# Patient Record
Sex: MALE | Race: WHITE
[De-identification: names, ages, dates, MRNs, and addresses within clinical notes are randomized per-mention and may not be internally consistent; named-entity substitution may affect disease eponyms.]

---

## 2017-01-03 ENCOUNTER — HOSPITAL ENCOUNTER (INPATIENT)
Age: 77
LOS: 21 days | Discharge: NURSING, REHAB OR LONG-TERM CARE | DRG: 233 | End: 2017-01-24

## 2017-01-03 DIAGNOSIS — R65.10: ICD-10-CM

## 2017-01-03 DIAGNOSIS — I31.9: ICD-10-CM

## 2017-01-03 DIAGNOSIS — E11.9: ICD-10-CM
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
## 2017-01-03 DIAGNOSIS — J96.01: ICD-10-CM

## 2017-01-03 DIAGNOSIS — N17.9: ICD-10-CM

## 2017-01-03 DIAGNOSIS — B36.8: ICD-10-CM

## 2017-01-03 DIAGNOSIS — R30.0: ICD-10-CM

## 2017-01-03 DIAGNOSIS — I21.4: Primary | ICD-10-CM

## 2017-01-03 DIAGNOSIS — I50.33: ICD-10-CM

## 2017-01-03 DIAGNOSIS — I10: ICD-10-CM

## 2017-01-03 DIAGNOSIS — D64.9: ICD-10-CM

## 2017-01-03 DIAGNOSIS — E87.6: ICD-10-CM

## 2017-01-03 DIAGNOSIS — F41.8: ICD-10-CM

## 2017-01-03 DIAGNOSIS — B96.4: ICD-10-CM

## 2017-01-03 DIAGNOSIS — J18.9: ICD-10-CM

## 2017-01-03 DIAGNOSIS — B37.9: ICD-10-CM

## 2017-01-03 DIAGNOSIS — I25.10: ICD-10-CM

## 2017-01-03 DIAGNOSIS — N40.0: ICD-10-CM

## 2017-01-03 DIAGNOSIS — J40: ICD-10-CM

## 2017-01-03 DIAGNOSIS — E11.8: ICD-10-CM

## 2017-01-03 DIAGNOSIS — N39.0: ICD-10-CM

## 2017-01-03 DIAGNOSIS — R49.0: ICD-10-CM

## 2017-01-03 DIAGNOSIS — E78.5: ICD-10-CM

## 2017-01-03 DIAGNOSIS — J34.2: ICD-10-CM

## 2017-01-23 ENCOUNTER — HOSPITAL ENCOUNTER (INPATIENT)
Dept: HOSPITAL 10 - VRC | Age: 77
LOS: 16 days | Discharge: HOME HEALTH | DRG: 280 | End: 2017-02-08
Attending: INTERNAL MEDICINE | Admitting: PHYSICAL MEDICINE & REHABILITATION
Payer: MEDICARE

## 2017-01-23 VITALS — WEIGHT: 190.26 LBS | HEIGHT: 65 IN | BODY MASS INDEX: 31.7 KG/M2

## 2017-01-23 DIAGNOSIS — I95.1: ICD-10-CM

## 2017-01-23 DIAGNOSIS — D72.819: ICD-10-CM

## 2017-01-23 DIAGNOSIS — Z95.1: ICD-10-CM

## 2017-01-23 DIAGNOSIS — I21.4: Primary | ICD-10-CM

## 2017-01-23 DIAGNOSIS — M48.00: ICD-10-CM

## 2017-01-23 DIAGNOSIS — K59.00: ICD-10-CM

## 2017-01-23 DIAGNOSIS — D64.9: ICD-10-CM

## 2017-01-23 DIAGNOSIS — I25.118: ICD-10-CM

## 2017-01-23 DIAGNOSIS — J96.01: ICD-10-CM

## 2017-01-23 DIAGNOSIS — Z98.1: ICD-10-CM

## 2017-01-23 DIAGNOSIS — F33.2: ICD-10-CM

## 2017-01-23 DIAGNOSIS — E78.5: ICD-10-CM

## 2017-01-23 DIAGNOSIS — I11.0: ICD-10-CM

## 2017-01-23 DIAGNOSIS — G72.89: ICD-10-CM

## 2017-01-23 DIAGNOSIS — N40.1: ICD-10-CM

## 2017-01-23 DIAGNOSIS — I50.31: ICD-10-CM

## 2017-01-23 DIAGNOSIS — G62.9: ICD-10-CM

## 2017-01-23 DIAGNOSIS — N31.9: ICD-10-CM

## 2017-01-23 DIAGNOSIS — Z74.09: ICD-10-CM

## 2017-01-23 DIAGNOSIS — B36.9: ICD-10-CM

## 2017-01-23 DIAGNOSIS — R32: ICD-10-CM

## 2017-01-23 DIAGNOSIS — N17.9: ICD-10-CM

## 2017-01-23 DIAGNOSIS — R39.14: ICD-10-CM

## 2017-01-23 DIAGNOSIS — L03.90: ICD-10-CM

## 2017-01-23 DIAGNOSIS — F41.9: ICD-10-CM

## 2017-01-23 PROCEDURE — 92526 ORAL FUNCTION THERAPY: CPT

## 2017-01-23 PROCEDURE — 81003 URINALYSIS AUTO W/O SCOPE: CPT

## 2017-01-23 PROCEDURE — 84100 ASSAY OF PHOSPHORUS: CPT

## 2017-01-23 PROCEDURE — 92610 EVALUATE SWALLOWING FUNCTION: CPT

## 2017-01-23 PROCEDURE — 83735 ASSAY OF MAGNESIUM: CPT

## 2017-01-23 PROCEDURE — 97167 OT EVAL HIGH COMPLEX 60 MIN: CPT

## 2017-01-23 PROCEDURE — 97530 THERAPEUTIC ACTIVITIES: CPT

## 2017-01-23 PROCEDURE — 97112 NEUROMUSCULAR REEDUCATION: CPT

## 2017-01-23 PROCEDURE — 82962 GLUCOSE BLOOD TEST: CPT

## 2017-01-23 PROCEDURE — 87086 URINE CULTURE/COLONY COUNT: CPT

## 2017-01-23 PROCEDURE — 97542 WHEELCHAIR MNGMENT TRAINING: CPT

## 2017-01-23 PROCEDURE — 97116 GAIT TRAINING THERAPY: CPT

## 2017-01-23 PROCEDURE — 80048 BASIC METABOLIC PNL TOTAL CA: CPT

## 2017-01-23 PROCEDURE — 97110 THERAPEUTIC EXERCISES: CPT

## 2017-01-23 PROCEDURE — 87081 CULTURE SCREEN ONLY: CPT

## 2017-01-23 PROCEDURE — 97535 SELF CARE MNGMENT TRAINING: CPT

## 2017-01-23 PROCEDURE — 74230 X-RAY XM SWLNG FUNCJ C+: CPT

## 2017-01-23 PROCEDURE — 80053 COMPREHEN METABOLIC PANEL: CPT

## 2017-01-23 PROCEDURE — 81001 URINALYSIS AUTO W/SCOPE: CPT

## 2017-01-23 PROCEDURE — 85025 COMPLETE CBC W/AUTO DIFF WBC: CPT

## 2017-01-23 PROCEDURE — 92611 MOTION FLUOROSCOPY/SWALLOW: CPT

## 2017-01-23 PROCEDURE — 97163 PT EVAL HIGH COMPLEX 45 MIN: CPT

## 2017-01-23 PROCEDURE — 95852 RANGE OF MOTION MEASUREMENTS: CPT

## 2017-01-24 VITALS — DIASTOLIC BLOOD PRESSURE: 58 MMHG | SYSTOLIC BLOOD PRESSURE: 129 MMHG | RESPIRATION RATE: 20 BRPM

## 2017-01-24 VITALS — DIASTOLIC BLOOD PRESSURE: 54 MMHG | SYSTOLIC BLOOD PRESSURE: 107 MMHG | HEART RATE: 85 BPM | RESPIRATION RATE: 18 BRPM

## 2017-01-24 PROCEDURE — F07Z8ZZ TRANSFER TRAINING TREATMENT: ICD-10-PCS | Performed by: INTERNAL MEDICINE

## 2017-01-24 PROCEDURE — F08Z0ZZ BATHING/SHOWERING TECHNIQUES TREATMENT: ICD-10-PCS | Performed by: INTERNAL MEDICINE

## 2017-01-24 PROCEDURE — F08Z2ZZ GROOMING/PERSONAL HYGIENE TREATMENT: ICD-10-PCS | Performed by: INTERNAL MEDICINE

## 2017-01-24 PROCEDURE — F07Z5ZZ BED MOBILITY TREATMENT: ICD-10-PCS | Performed by: INTERNAL MEDICINE

## 2017-01-24 PROCEDURE — F07Z9ZZ GAIT TRAINING/FUNCTIONAL AMBULATION TREATMENT: ICD-10-PCS | Performed by: INTERNAL MEDICINE

## 2017-01-24 PROCEDURE — F08Z1ZZ DRESSING TECHNIQUES TREATMENT: ICD-10-PCS | Performed by: INTERNAL MEDICINE

## 2017-01-24 PROCEDURE — F06ZDZZ SWALLOWING DYSFUNCTION TREATMENT: ICD-10-PCS | Performed by: INTERNAL MEDICINE

## 2017-01-24 RX ADMIN — TAMSULOSIN HYDROCHLORIDE SCH MG: 0.4 CAPSULE ORAL at 20:21

## 2017-01-24 RX ADMIN — GUAIFENESIN SCH MG: 100 SOLUTION ORAL at 18:00

## 2017-01-24 RX ADMIN — METOPROLOL TARTRATE SCH MG: 25 TABLET, FILM COATED ORAL at 20:23

## 2017-01-24 RX ADMIN — SUCRALFATE SCH GM: 1 SUSPENSION ORAL at 17:15

## 2017-01-24 RX ADMIN — FAMOTIDINE SCH MG: 20 TABLET ORAL at 20:22

## 2017-01-24 RX ADMIN — ATORVASTATIN CALCIUM SCH MG: 80 TABLET, FILM COATED ORAL at 20:26

## 2017-01-24 RX ADMIN — HYDROMORPHONE HYDROCHLORIDE PRN MG: 1 INJECTION, SOLUTION INTRAMUSCULAR; INTRAVENOUS; SUBCUTANEOUS at 20:11

## 2017-01-24 NOTE — CONS
Date/Time of Note


Date/Time of Note


DATE: 1/24/17 


TIME: 10:22





Assessment/Plan


Assessment/Plan


Additional Assessment/Plan


Assessment and recommendations;





1.  Patient patient status post coronary bypass surgery clinically doing very 

well.





2.  Is experiencing generalized weakness however getting physical therapy.





Continue current supportive care.





Consultation Date/Type/Reason


Admit Date/Time





Initial Consult Date








24 HR Interval Summary


Free Text/Dictation


Patient is doing fairly well still complains of generalized weakness unable to 

walk yet.  Denies any chest pain.  Denies any coughing.  Denies any sputum 

production.  Next





General examination; elderly, awake alert oriented 3.  Currently no distress.





Exam/Review of Systems


Exam


HEENT examination; supple neck, no JVD, no lymphadenopathy, pharynx is clear.  

Fair dentition.  Midsize pupils.  No neck bruits.  No thyromegaly.





Chest examination; diminished but clear breath sounds S1-S2 audible no murmurs 

regular rhythm there is a well-healed sternal scar.





Abdomen examination; soft, nontender, nondistended, no organomegaly.  Bowel 

sounds audible.





Extremity examination; no peripheral edema, pulses 1+ bilaterally.





CNS examination; no focal deficit.











ANDREW SANCHEZ Jan 24, 2017 10:24

## 2017-01-25 VITALS — SYSTOLIC BLOOD PRESSURE: 119 MMHG | DIASTOLIC BLOOD PRESSURE: 59 MMHG | HEART RATE: 78 BPM | RESPIRATION RATE: 20 BRPM

## 2017-01-25 VITALS — RESPIRATION RATE: 18 BRPM | DIASTOLIC BLOOD PRESSURE: 61 MMHG | SYSTOLIC BLOOD PRESSURE: 134 MMHG

## 2017-01-25 VITALS — HEART RATE: 84 BPM

## 2017-01-25 LAB
ADD UMIC: YES
ALBUMIN SERPL-MCNC: 3.6 G/DL (ref 3.3–4.9)
ALBUMIN/GLOB SERPL: 0.85 {RATIO}
ALP SERPL-CCNC: 159 IU/L (ref 42–121)
ALT SERPL-CCNC: 41 IU/L (ref 13–69)
ANION GAP SERPL CALC-SCNC: 15 MMOL/L (ref 8–16)
AST SERPL-CCNC: 40 IU/L (ref 15–46)
BACTERIA #/AREA URNS HPF: (no result) /[HPF]
BASOPHILS # BLD AUTO: 0 10^3/UL (ref 0–0.1)
BASOPHILS NFR BLD: 0 % (ref 0–2)
BILIRUB DIRECT SERPL-MCNC: 0 MG/DL (ref 0–0.2)
BILIRUB SERPL-MCNC: 0.4 MG/DL (ref 0.2–1.3)
BUN SERPL-MCNC: 18 MG/DL (ref 7–20)
CALCIUM SERPL-MCNC: 8.7 MG/DL (ref 8.4–10.2)
CHLORIDE SERPL-SCNC: 95 MMOL/L (ref 97–110)
CO2 SERPL-SCNC: 33 MMOL/L (ref 21–31)
COLOR UR: (no result)
CONDITION: 1
CREAT SERPL-MCNC: 0.79 MG/DL (ref 0.61–1.24)
EOSINOPHIL # BLD: 0 10^3/UL (ref 0–0.5)
EOSINOPHIL NFR BLD: 0.3 % (ref 0–7)
ERYTHROCYTE [DISTWIDTH] IN BLOOD BY AUTOMATED COUNT: 13.6 % (ref 11.5–14.5)
GLOBULIN SER-MCNC: 4.2 G/DL (ref 1.3–3.2)
GLUCOSE SERPL-MCNC: 100 MG/DL (ref 70–220)
GLUCOSE UR STRIP-MCNC: NEGATIVE %
HCT VFR BLD CALC: 31.9 % (ref 42–52)
HGB BLD-MCNC: 10.6 G/DL (ref 14–18)
KETONES UR STRIP.AUTO-MCNC: NEGATIVE MG/DL
LH ANALYZER COMMENTS: 1
LYMPHOCYTES # BLD AUTO: 0.7 10^3/UL (ref 0.8–2.9)
LYMPHOCYTES NFR BLD AUTO: 10.7 % (ref 15–51)
MCH RBC QN AUTO: 29.5 PG (ref 29–33)
MCHC RBC AUTO-ENTMCNC: 33.4 G/DL (ref 32–37)
MCV RBC AUTO: 88.4 FL (ref 82–101)
MONOCYTES # BLD: 1.3 10^3/UL (ref 0.3–0.9)
MONOCYTES NFR BLD: 18.9 % (ref 0–11)
NEUTROPHILS # BLD: 4.7 10^3/UL (ref 1.6–7.5)
NEUTROPHILS NFR BLD AUTO: 70.1 % (ref 39–77)
NITRITE UR QL STRIP.AUTO: NEGATIVE
NRBC # BLD MANUAL: 0 10^3/UL (ref 0–0)
NRBC BLD QL: 0 /100WBC (ref 0–0)
PLATELET # BLD: 359 10^3/UL (ref 140–440)
PMV BLD AUTO: 8.8 FL (ref 7.4–10.4)
POTASSIUM SERPL-SCNC: 3.5 MMOL/L (ref 3.5–5.1)
PROT SERPL-MCNC: 7.8 G/DL (ref 6.1–8.1)
RBC # BLD AUTO: 3.6 10^6/UL (ref 4.7–6.1)
RBC # UR AUTO: (no result) /UL
RBC #/AREA URNS HPF: (no result) /HPF
SODIUM SERPL-SCNC: 139 MMOL/L (ref 135–144)
URINE BILIRUBIN (DIP): NEGATIVE
URINE TOTAL PROTEIN (DIP): (no result)
UROBILINOGEN UR STRIP-ACNC: (no result) (ref 0.1–1)
WBC # BLD AUTO: 6.8 10^3/UL (ref 4.8–10.8)
WBC # BLD: 6.8 10^3/UL (ref 4.8–10.8)
WBC # UR STRIP: NEGATIVE /UL

## 2017-01-25 RX ADMIN — GUAIFENESIN SCH MG: 100 SOLUTION ORAL at 17:56

## 2017-01-25 RX ADMIN — POTASSIUM CHLORIDE SCH MEQ: 20 TABLET, EXTENDED RELEASE ORAL at 09:07

## 2017-01-25 RX ADMIN — GUAIFENESIN SCH MG: 100 SOLUTION ORAL at 07:01

## 2017-01-25 RX ADMIN — FAMOTIDINE SCH MG: 20 TABLET ORAL at 20:50

## 2017-01-25 RX ADMIN — SUCRALFATE SCH GM: 1 SUSPENSION ORAL at 12:57

## 2017-01-25 RX ADMIN — GUAIFENESIN SCH MG: 100 SOLUTION ORAL at 00:11

## 2017-01-25 RX ADMIN — ATORVASTATIN CALCIUM SCH MG: 80 TABLET, FILM COATED ORAL at 20:50

## 2017-01-25 RX ADMIN — GUAIFENESIN SCH MG: 100 SOLUTION ORAL at 00:00

## 2017-01-25 RX ADMIN — METOPROLOL TARTRATE SCH MG: 25 TABLET, FILM COATED ORAL at 09:10

## 2017-01-25 RX ADMIN — FAMOTIDINE SCH MG: 20 TABLET ORAL at 09:09

## 2017-01-25 RX ADMIN — FLUOXETINE SCH MG: 20 CAPSULE ORAL at 09:09

## 2017-01-25 RX ADMIN — METOPROLOL TARTRATE SCH MG: 25 TABLET, FILM COATED ORAL at 19:50

## 2017-01-25 RX ADMIN — HYDROMORPHONE HYDROCHLORIDE PRN MG: 1 INJECTION, SOLUTION INTRAMUSCULAR; INTRAVENOUS; SUBCUTANEOUS at 20:06

## 2017-01-25 RX ADMIN — SUCRALFATE SCH GM: 1 SUSPENSION ORAL at 00:11

## 2017-01-25 RX ADMIN — ASPIRIN 325 MG ORAL TABLET SCH MG: 325 PILL ORAL at 09:09

## 2017-01-25 RX ADMIN — ENOXAPARIN SODIUM SCH MG: 100 INJECTION SUBCUTANEOUS at 09:12

## 2017-01-25 RX ADMIN — THERA TABS SCH TAB: TAB at 09:09

## 2017-01-25 RX ADMIN — GUAIFENESIN SCH MG: 100 SOLUTION ORAL at 12:58

## 2017-01-25 RX ADMIN — SUCRALFATE SCH GM: 1 SUSPENSION ORAL at 07:01

## 2017-01-25 RX ADMIN — TAMSULOSIN HYDROCHLORIDE SCH MG: 0.4 CAPSULE ORAL at 20:49

## 2017-01-25 RX ADMIN — SUCRALFATE SCH GM: 1 SUSPENSION ORAL at 17:56

## 2017-01-25 NOTE — RADRPT
PROCEDURE:   Video-fluoroscopy swallowing study. 

 

CLINICAL INDICATION:   Dysphagia. 

 

TECHNIQUE:   Fluoroscopic guided video swallowing study was done in conjunction with the speech ther
apist. The study was confined to the oral, pharyngeal, and cervical phases of the swallowing mechani
sm. 5.0 minutes of fluoroscopy time was used.

 

COMPARISON:   No prior study is available for comparison. 

 

FINDINGS:

There is aspiration during swallowing of thin liquids.

 

IMPRESSION:

1.  Aspiration during swallowing.

2.  Please refer to the speech therapist's recommendations for future feedings.  

 

RPTAT: QQ

_____________________________________________ 

.Tyler Peterson MD, MD           Date    Time 

Electronically viewed and signed by .Tyler Peterson MD, MD on 01/25/2017 14:46 

 

D:  01/25/2017 14:46  T:  01/25/2017 14:46

.R/

## 2017-01-25 NOTE — CONS
DATE OF ADMISSION: 01/24/2017

DATE OF CONSULTATION:  01/25/2017

 

 

 

REHABILITATION POST ADMISSION PHYSICIAN EVALUATION

 

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

 

ACTIVE COMORBIDITIES:

1.  Status post respiratory failure.

2.  Status post non-ST elevation myocardial infarction and coronary artery 
bypass graft.

3.  History of cervical radiculopathy and neuropathy.

4.  Dysphagia, on mechanical soft diet.

5.  Fungal cellulitis.

6.  Benign prostatic hypertrophy.

7.  History of anxiety and depression.

8.  Impairments in self-care and mobility.

 

HISTORY OF PRESENT ILLNESS:  The patient is a pleasant 76-year-old gentleman 
with a history of multiple medical comorbidities, who was admitted with a non-
ST elevation MI.  The patient was initially treated at [facility], 
subsequently transferred to [facility] and did undergo 
cardiac catheterization and noted to have multivessel disease.  The patient 
underwent coronary artery bypass graft on 01/06/2017.  The patient's hospital 
course has been notable for significant weakness as compared to baseline, 
dysphagia requiring mechanical soft diet, and significant impairments in self-
care and mobility as compared to baseline.  The patient was felt to likely have 
critical illness myopathy.  The patient has now been cleared to transfer to the 
Rehabilitation Unit for comprehensive interdisciplinary rehab care.

 

FUNCTIONAL HISTORY:  Reportedly, prior admission, the patient required moderate 
assist for self-care and mobility tasks.  Currently, the patient requires 
maximal assist for self-care and mobility.

 

SOCIAL HISTORY:  The patient lives in an assisted living facility and hopes to 
return there upon discharge.

 

PAST MEDICAL HISTORY:

1.  Cervical radiculopathy and neuropathy status post surgery.

2.  History of shoulder surgery.

3.  History of hip arthroplasty.

4.  History of heart failure.

5.  Hypertension.

6.  Dyslipidemia.

 

CURRENT MEDICATIONS:

1.  Albuterol inhaler.

2.  Aspirin 325 p.o. daily.

3.  Lipitor 80 mg p.o. at bedtime.

4.  Klonopin 1 mg b.i.d.

5.  Insulin sliding scale.

6.  Lovenox 40 mg subcutaneous daily.

7.  Pepcid 20 mg b.i.d.

8.  Prozac 40 mg p.o. daily.

9.  Lasix 40 mg b.i.d.

10.  Robitussin p.r.n.

11.  Norco p.r.n.

12.  Dilaudid p.r.n.

13.  Multivitamin p.o. daily.

14.  Nitroglycerin p.r.n.

15.  Percocet p.r.n.

16.  Klor-Con 20 mEq p.o. daily.

17.  Flomax 0.4 mg p.o. at bedtime.

18.  Desyrel 100 mg p.o. at bedtime.

 

ALLERGIES:  THE PATIENT WITH NO KNOWN DRUG ALLERGIES.

 

PHYSICAL EXAMINATION:

VITAL SIGNS:  The patient is currently afebrile with stable vital signs.

HEENT:  The extraocular motions are intact.  Oropharynx is clear.

NECK:  Supple.

LUNGS:  Clear anteriorly.

CARDIAC:  S1, S2.

ABDOMEN:  Soft, nontender, positive bowel sounds.

NEUROLOGIC:  He is awake and alert and oriented x3.  He will follow simple one-
step commands.  He demonstrates 4- strength in bilateral upper extremities and 
lower extremities.  He does have impaired dynamic balance.

 

PLAN:  The patient has been admitted for comprehensive interdisciplinary acute 
rehab and is anticipated to tolerate 3 hours of daily therapy in divided doses 
for at least 5/7 days a week.  The treatment plan will include:

1.  Physical therapy to focus on bed mobility, transfers, and household 
ambulation with the goal of having the patient reach a minimal to moderate 
assist level.

2.  Occupational therapy to focus on hygiene, grooming, dressing, bathing, and 
toileting activities with the goal of having the patient reach a minimal to 
moderate assist level.

3.  Rehabilitation nursing for carryover of therapeutic interventions, the goal 
of continent of bowel and bladder, and the goal of patient education with 
regard to the aforementioned issues.

4. Speech Therapy for dysphagia evaluation and management, with goal of meeting 
nutritional needs by mouth.

 

ESTIMATED LENGTH OF STAY:  14 days.

 

DISPOSITION GOAL:  Back to his residence.

 

REHABILITATION BARRIER:  Weakness.

 

INTERVENTION FOR BARRIER:  Comprehensive interdisciplinary approach.

 

I acknowledge that I have performed a full physical examination on this patient 
within 24 hours of admission to the Rehabilitation Unit.  I believe the patient 
is a good candidate for comprehensive interdisciplinary rehab care and is 
anticipated to make reasonable goals in a reasonable period of time as outlined 
above.

 

 

Dictated By: GABI FOSTER/DIMPLE

DD:    01/25/2017 10:31:04

DT:    01/25/2017 11:22:38

Conf#: [id number]

DID#:  [id number]

 

MTDD

## 2017-01-25 NOTE — CONS
DATE OF ADMISSION: 01/24/2017

DATE OF CONSULTATION:  01/25/2017

 

 

 

REQUESTING PHYSICIAN:  Aurora Arroyo MD and Polo Espinal DO

 

HISTORY OF PRESENT ILLNESS:  This is a 76-year-old male who recently underwent a coronary artery byp
ass graft surgery at [facility] and then he was transferred to the rehab for rehab
ilitation.  His postop course was complicated with bronchitis and pneumonia, and he was treated with
 antiretroviral therapy as well as on antimicrobial therapy and then he had some dysphonia and was s
een by the ENT people.  He also had acute diastolic heart failure and he was then treated with aggre
ssive diuretic therapy.  He had respiratory failure secondary to the congestive heart failure which 
eventually got better.   The patient has urinary incontinence, and that is why urological consultati
on was requested.  Apparently, his incontinence goes back many years.  He had cervical spine fusion 
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
y with some rehabilitation and training he is able to use his upper extremities, but not completely,
 still has weakness and also for his lower extremities as well.  Since then he has been having probl
em with urinary incontinence and he has seen multiple different urologists and has been worked up ex
tensively.  Apparently at the end he decided to only wearing diapers and he has been living in an as
sisted living facility where they changed his diaper 4 to 5 times a day and he has been managing wel
l with that.  Attempts to put a condom catheter on him here were not successful, in essence trying t
o keep him dry because he started having some excoriation on the skin in the groins, but the condom 
catheter would not stay on.  His penis is not pendulous and therefore it is difficult to keep the co
ndom catheter on.  The patient was advised before also if he could do self-intermittent catheterizat
ion, he refused it then and he is still refusing now.  He states that usually the bladder is not ove
rflowing but is not emptying completely.  He does have about 150 to 200 mL of postvoid residual.  He
 denies any dysuria and no history of hematuria.

 

PAST MEDICAL HISTORY:  As stated above with a history of hypertension, dyslipidemia, diastolic heart
 failure.

 

PAST SURGICAL HISTORY:  Again includes cervical spine surgery, cervical spine and neck fusion and hi
p arthroplasty and shoulder surgery.

 

ALLERGIES:  NO KNOWN DRUG ALLERGIES.

 

FAMILY HISTORY:  Negative.

 

SOCIAL HISTORY:  Does not smoke, does not drink, and no history of drug abuse.

 

REVIEW OF SYSTEMS:  A 14-points review of system has been contacted and pertinent positives were not
ed in the history and physical.

 

MEDICATIONS:  Presently, he is on include:

1.  Potassium chloride. 

2.  Multivitamin.

3.  Prozac.

4.  Aspirin.

5.  Lovenox.

6.  Trazodone.  

7.  Tamsulosin.  

8.  Metoprolol.  

9.  Ketoconazole

10.  Atorvastatin.

11.  Klonopin.

12.  Pepcid.

13.  Carafate.

14.  Robitussin.

15.  Lasix.

16.  Insulin coverage. 

17.  Percocet for pain.

18.  Morphine sulfate p.r.n. pain.

19.  Dilaudid p.r.n. for pain. 

20.  Tylenol p.r.n.

21.  Albuterol p.r.n.

 

PHYSICAL EXAMINATION:

GENERAL:  Reveals a 76-year-old male.  He weighs 86.3 kilograms.  He is 65 inches tall.

VITAL SIGNS:  Temperature is 98.4, pulse 73, respiration 18, blood pressure 134/61.

EXTREMITIES:  He does have a scar on his chest from his recent surgery.

ABDOMEN:  Soft.  The bladder is not distended.  We did the bladder scan on him, his diaper is wet an
d the bladder scan showed a volume between 150 to about 250 mL.

GENITALIA:  External genitalia otherwise were normal.

RECTAL:  I did not do a rectal exam on him as he has been already worked up extensively by other uro
logists.

 

LABORATORY DATA:  His CBC shows a white count of 6.8, hemoglobin 10.6, hematocrit 31.9.  BUN is 18, 
creatinine 0.79.  Electrolytes are sodium 139, potassium 3.5, chloride 95, CO2 is 33.

 

IMPRESSION:  Urinary incontinence and patient does not empty his bladder completely, but he is not i
n urinary retention, and there no overflow incontinence.  His postvoid residual is about 200 mL.  On
e of the options that was given to him would be to put him on anticholinergic and have him do interm
ittent catheterization and he refused to do that.  He does not want to do that now and he did not wa
nt to do it before.  Putting a catheter is not a good option for his mobility and also for his housi
ng. 

 

PLAN:  The plan is then to keep using the diapers and as he gets wet have them changed and keep on c
hanging them every 4 to 6 hours or as needed more often.  I will follow his urological problem with 
you.  I do thank you for allowing me to help in his care.

 

 

Dictated By: ASHELY ESPINOZA/DIMPLE

DD:    01/25/2017 13:26:40

DT:    01/25/2017 14:50:00

Conf#: [id number]

DID#:  [id number]

## 2017-01-25 NOTE — CONS
DATE OF ADMISSION: 2017

DATE OF CONSULTATION:  2017

 

 

 

TYPE OF CONSULTATION:  Psychological.

 

REFERRING PHYSICIAN:  Gabi Arroyo MD

 

CONSULTING PSYCHOLOGIST:  Katey Prabhakar, PhD

 

REASON FOR CONSULTATION:  This consultation was requested by Dr. Young Arroyo in order to evaluate 
the cognitive and emotional functioning of this patient related to his present medical condition.

 

HISTORY OF PRESENT ILLNESS:  The patient is a 76-year-old male.  The patient has numerous medical pr
oblems.  The patient recently is status post coronary artery bypass grafting.  The patient was clear
ed medically and then sent to the acute rehabilitation unit for acute multidisciplinary rehabilitati
on.  The patient has numerous problems that he is dealing with and is very frustrated.  The patient 
has had a long history of depression related to this and has been on antidepressant medications for 
a long time.  The patient did see a psychiatrist for a period of time, but presently does not.  The 
patient has an extremely rare urinary problem wherein he has what seems to be urinary tract infectio
n causing pain, but they have never been able to find it with numerous consultations and the patient
 continues to have pain in his urinary tract.  The patient is frustrated about all his medical probl
ems, but does say that he was depressed, but is less depressed now that he came to the acute rehabil
itation unit and is making some progress towards getting himself slightly healthier and able to leav
 the hospitals.

 

FAMILY AND SOCIAL HISTORY:  The patient has a sister who was present during part of the consultation
 with the patient's permission.  The patient lives in an assisted living situation in [facility]
nd wants to return there after discharge.

 

MEDICATIONS:  The patient is currently takin.  Prozac 40 mg daily.

2.  Klonopin 1 mg b.i.d.

3.  Trazodone 100 mg at bedtime.

 

SUBSTANCE USE:  The patient reports no use of alcohol or other drugs.  The patient reports that he d
oes not smoke.

 

MENTAL STATUS EXAMINATION:

APPEARANCE:  The patient was seen in bed.  He appeared to be of average height and weight.  He was o
n oxygen.  The patient reports that he has been on oxygen and since his surgery.  The patient is rig
ht-handed.

BEHAVIOR:  The patient was cooperative during the consultation.  The patient did attempt to answer a
ll questions presented to him by the interviewer.

MOOD AND AFFECT:  The patient's mood appears to be slightly depressed.  The patient reports that he 
was more depressed before and that he is starting to pull out of it now that he sees some progress i
 health.  Affect did appear to be slightly anxious.

PERCEPTION:  The patient reports no hallucinations or delusions.  The patient was alert to person, p
lace, situation, and time.

MEMORY AND COGNITION:  The patient's memory and cognition are basically intact.  He was able to say 
the name of the hospital.  He was able to say the month and the year.  The patient was able to say w
ho the  is and who the governor of the [facility] is.  The pa
francisca was able to do 1 serial 7 subtraction from 100 and then made an error.  The patient also could
 not spell "world" backwards, but did say that he had a rare form of dyslexia that made it very diff
icult for him to do any level of spelling.  The patient's overall cognitive functioning appears to b
e relatively intact given all his medical problems and his recent surgery.

INTELLIGENCE:  Intelligence appears to fall in the average range.

INSIGHT:  Fair.

JUDGMENT:  Fair.

THOUGHT CONTENT:  The patient is concerned about his present medical condition.  The patient is anxi
ous and depressed.  The patient admits this.  The patient does want to return to his previous level 
of functioning and is motivated to get better.

 

DISCUSSION:  The patient can likely benefit from some cognitive/behavioral psychotherapy while he is
 on the unit.  This psychotherapy would help focus on his underlying level of frustration and depres
morgan given all his medical problems.

 

DIAGNOSTIC IMPRESSION:  F33.3:  Major depressive disorder, recurrent, severe.

 

Thank you very much, Dr. Young Arroyo, for referring this individual.  Please do not hesitate to ca
ll if you have additional questions.

 

 

Dictated By: KATEY PRABHAKAR PHD

 

VIKTORIYA/DIMPLE

DD:    2017 21:42:19

DT:    2017 22:11:15

Conf#: [id number]

DID#:  [id number]

CC: GABI ARROYO MD;*EndCC*

## 2017-01-25 NOTE — HP
DATE OF ADMISSION: 01/24/2017

 

CHIEF COMPLAINT:  

1.  Status post coronary artery bypass grafting. 

2.  Severe debility.

 

HISTORY OF PRESENT ILLNESS:  This is a 76-year-old male with a past medical history of neuropathy se
condary to spinal stenosis, history of hypertension, dyslipidemia, coronary artery disease who prese
nted to an outside hospital with chest pain.  The patient was ruled in for non-STEMI, was transferre
d to [facility] and underwent cardiac catheterization, showing multivessel disease
 including critical stenosis of the anterior descending artery.  Patient was medically managed by ca
rdiologist, Dr. Limon.  The patient was then seen by CT surgeon, Dr. Saenz and underwent CABG on 
01/06/2017.  Following surgery, the patient's course was complicated with bronchitis, healthcare-ass
ociated pneumonia which he was treated for antiviral therapy, antimicrobial therapy and was followed
 by infectious disease specialist, Dr. Kate Meadows. In terms of the patient's pneumonia, clinically
 improved.  The patient during the hospital course also noted to have dysphonia found by ENT and fou
nd no acute pathology.  The patient also had acute diastolic heart failure during the hospital cours
e which was treated with aggressive diuretic therapy which clinically improved.  Patient also develo
ped respiratory failure secondary to congestive heart failure which concomitantly improved while antonia
ating underlying heart failure.  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
. He has been receiving aggressive physical therapy.  Patient was eventually stabilized and transfer
red over to [facility] acute rehab for continued care.

 

Upon my evaluation of patient at this time, he is currently stable.  Denies any fevers, chills, naus
ea, vomiting, no shortness of breath.  The patient continues to describe general weakness, but denie
s any active pain at this time.

 

PAST MEDICAL HISTORY:  As stated above.  History of hypertension, dyslipidemia, history of diastolic
 heart failure.

 

PAST SURGICAL HISTORY:  Includes multiple spinal surgeries, cervical neck fusion, status post hip ar
throplasty, shoulder surgery.

 

ALLERGIES:  NO KNOWN DRUG ALLERGIES.

 

FAMILY HISTORY:  No family history of kidney disease or heart disease.

 

SOCIAL HISTORY:  Does not drink, smoke or do drugs.

 

MEDICATIONS:  Reviewed.

 

REVIEW OF SYSTEMS:  A 14-point review of systems was conducted.  Pertinent positives in HPI, otherwi
se negative.

 

PHYSICAL EXAMINATION:

VITAL SIGNS:  Blood pressure is 134/61, respiration 18, pulse 70, temperature 98.4.  I's and O's 800
 in, 200 out.

HEENT:  Head is normocephalic.

NECK:  Supple.

HEART:  Regular rate.

CHEST:  On chest exam the patient has dressing over his sternotomy scar.

LUNGS:  Show diminished breath sounds at base, otherwise clear.

ABDOMEN:  Soft, nontender to palpation.  No rebound or guarding.

EXTREMITIES:  Negative for clubbing, cyanosis.  Trace edema.

MUSCULOSKELETAL:  No joint effusions.

NEUROLOGIC:  Patient has general weakness upper and lower extremity, no obvious focal deficits, no c
hange since previous exam.

DERMATOLOGIC:  The patient has noted excoriations and erythema in his groin area and a rash on his b
ack.

 

LABORATORY DATA:  Shows white count 6.8, hemoglobin 10.6, hematocrit 31.9, platelet count 359.  Sodi
um 139, potassium 3.5, BUN 18, creatinine 0.79.

 

ASSESSMENT AND PLAN:

1.  Non-ST elevation myocardial infarction.  Coronary artery disease status post CABG on 01/06/2016.
  The patient is hemodynamically stable.  Continue current medical management.

2.  Acute diastolic heart failure.  The patient is near euvolemic status.  Continue current medical 
management.  Continue Lasix current dose.

3.  Status post pneumonia/bronchitis.  The patient has completed antibiotics and antiviral therapy, 
continue to monitor.

4.  Nonoliguric acute kidney injury, etiology is hemodynamics.  Renal function has returned to basel
ine.  Continue supportive care.

5.  Hypertension.  Continue current blood pressure regimen.

6.  Acute hypoxemic respiratory failure secondary to congestive heart failure/pneumonia, clinically 
improving.  The patient is being weaned off oxygen.

7.  Anemia.  Continue to monitor hemoglobin and hematocrit levels.

8.  Benign prostatic hypertrophy.  Continue Flomax.

9.  Fungal cellulitis rash.  Continue Diflucan cream.

10.  Depression, anxiety disorder. Continue Prozac, Klonopin. 

11.  Dyslipidemia. Continue statin therapy.

12.  ICU myopathy.  Continue physical therapy.

13.  History of upper extremity and lower extremity weakness due to neuropathy from previous spinal 
surgery.  Continue physical therapy.

14.  Gastrointestinal and deep venous thrombosis prophylaxis.  Continue proton pump inhibitor and Lo
venox.

 

 

Dictated By: CHAVA GARAZ DO

 

NR/NTS

DD:    01/25/2017 08:55:40

DT:    01/25/2017 11:28:59

Conf#: [id number]

DID#:  [id number]

## 2017-01-25 NOTE — CONS
Date/Time of Note


Date/Time of Note


DATE: 1/25/17 


TIME: 10:06





Assessment/Plan


Assessment/Plan


Chief Complaint/Hosp Course


assessment/impression


- postoperative SIRS with fever and leukocytosis vs early sepsis d/t bronchitis 

+/- HCAP, pericarditis (Sputum cx grew C. albicans but no thrush noted); s/p 8 

day course of empiric pip/tazo for HCAP.


- possible pericarditis, either post-NSTEMI and post-CABG pericarditis or viral 

pericarditis (respiratory viruses).  Procalcitonin 0.42 on 1/11/17.


- persistent leukocytosis- resolved


- NSTEMI


- s/p CABG on 1/6/2017


- HTN


- perineal rash - on Nizoral cream


- Hx arthroplasty of hip


- s/p tamiflu (1/8/17-1/12/17; influenza RT-PCR negative), Vanco (1/8-1/15), 

and pip/tazo (1/8-1/16)





Recommendations: 


- monitor closely off abx


Problems:  





Consultation Date/Type/Reason


Admit Date/Time


Jan 24, 2017 at 13:35


Initial Consult Date








24 HR Interval Summary


Free Text/Dictation


doing well. d/w nursing. no fevers, chills, n/v/d





Exam/Review of Systems


Vital Signs


Vitals





 Vital Signs








  Date Time  Temp Pulse Resp B/P Pulse Ox O2 Delivery O2 Flow Rate FiO2


 


1/25/17 08:19       2.0 


 


1/25/17 07:40 98.4 73 18 134/61 96   


 


1/24/17 20:00      Nasal Cannula  














 Intake and Output   


 


 1/24/17 1/24/17 1/25/17





 15:00 23:00 07:00


 


Intake Total  240 ml 650 ml


 


Output Total   200 ml


 


Balance  240 ml 450 ml











Exam


Constitutional:  alert, oriented, well developed


Psych:  nl mood/affect, no complaints


Head:  atraumatic, normocephalic


Respiratory:  clear to auscultation, normal air movement


Cardiovascular:  nl pulses, regular rate and rhythm


Gastrointestinal:  nl liver, spleen, non-tender, soft





Results


Result Diagram:  


1/25/17 0645                                                                   

             1/25/17 0645





Results 24 hrs





Laboratory Tests








Test


  1/24/17


17:14 1/24/17


20:26 1/25/17


06:45 1/25/17


07:21


 


Bedside Glucose 104   140    108  


 


Alanine Aminotransferase


(ALT/SGPT) 


  


  41  


  


 


 


Albumin   3.6   


 


Albumin/Globulin Ratio   0.85   


 


Alkaline Phosphatase   159  H 


 


Anion Gap   15   


 


Aspartate Amino Transf


(AST/SGOT) 


  


  40  


  


 


 


Basophils #   0.0   


 


Basophils %   0.0   


 


Blood Urea Nitrogen   18   


 


Calcium Level   8.7   


 


Carbon Dioxide Level   33  H 


 


Chloride Level   95  L 


 


Creatinine   0.79   


 


Direct Bilirubin   0.00   


 


Eosinophils #   0.0   


 


Eosinophils %   0.3   


 


Globulin   4.20  H 


 


Glucose Level   100   


 


Hematocrit   31.9  L 


 


Hemoglobin   10.6  L 


 


Indirect Bilirubin   0.4   


 


Lymphocytes #   0.7  L 


 


Lymphocytes %   10.7  L 


 


Mean Corpuscular Hemoglobin   29.5   


 


Mean Corpuscular Hemoglobin


Concent 


  


  33.4  


  


 


 


Mean Corpuscular Volume   88.4   


 


Mean Platelet Volume   8.8   


 


Monocytes #   1.3  H 


 


Monocytes %   18.9  H 


 


Neutrophils #   4.7   


 


Neutrophils %   70.1   


 


Nucleated Red Blood Cells #   0.0   


 


Nucleated Red Blood Cells %   0.0   


 


Platelet Count   359   


 


Potassium Level   3.5   


 


Red Blood Count   3.60  L 


 


Red Cell Distribution Width   13.6   


 


Sodium Level   139   


 


Total Bilirubin   0.4   


 


Total Protein   7.8   


 


White Blood Count   6.8   














Test


  1/25/17


08:30 


  


  


 


 


Urine Bacteria OCCASIONAL     


 


Urine Bilirubin NEGATIVE     


 


Urine Clarity CLEAR     


 


Urine Color LT. YELLOW     


 


Urine Glucose NEGATIVE     


 


Urine Hemoglobin TRACE     


 


Urine Ketones NEGATIVE     


 


Urine Leukocyte Esterase NEGATIVE     


 


Urine Microscopic RBC 0-2     


 


Urine Microscopic WBC NONE SEEN     


 


Urine Nitrite NEGATIVE     


 


Urine Specific Gravity <=1.005  L   


 


Urine Total Protein 2+  H   


 


Urine Urobilinogen 0.2  E.U./dL     


 


Urine pH 6.0     











Medications


Medications





 Current Medications


Trazodone HCl (Desyrel) 100 mg HS PO  Last administered on 1/24/17at 20:22; 

Admin Dose 100 MG;  Start 1/24/17 at 21:00


Tamsulosin HCl (Flomax) 0.4 mg HS PO  Last administered on 1/24/17at 20:21; 

Admin Dose 0.4 MG;  Start 1/24/17 at 21:00


Oxycodone/ Acetaminophen (Percocet (5/ 325)) 1 tab Q3H  PRN PO PAIN LEVEL 1-5;  

Start 1/24/17 at 15:00


Oxycodone/ Acetaminophen (Percocet (5/ 325)) 2 tab Q3H  PRN PO PAIN LEVEL 6-10;

  Start 1/24/17 at 15:00


Ondansetron HCl (Zofran Inj) 4 mg Q6H  PRN IV NAUSEA AND/OR VOMITING;  Start 1/ 24/17 at 15:00


Sucralfate (Carafate Susp) 1 gm Q6 PO  Last administered on 1/25/17at 07:01; 

Admin Dose 1 GM;  Start 1/24/17 at 18:00


Potassium Chloride (Klor-Con 20) 20 meq DAILY PO  Last administered on 1/25/ 17at 09:07; Admin Dose 20 MEQ;  Start 1/25/17 at 09:00


Morphine Sulfate (morphine) 2 mg Q2H  PRN IV FOR NON CARDIAC PAIN (4-10);  

Start 1/24/17 at 15:00


Metoprolol Tartrate (Lopressor) 25 mg BID PO  Last administered on 1/25/17at 09:

10; Admin Dose 25 MG;  Start 1/24/17 at 21:00


Multivitamins Therapeutic (Theragran) 1 tab DAILY PO  Last administered on 1/25/ 17at 09:09; Admin Dose 1 TAB;  Start 1/25/17 at 09:00


Miscellaneous Information 1 ea NOTE XX ;  Start 1/24/17 at 15:00


Acetaminophen/ Hydrocodone Bitart (Norco (5/325)) 1 tab Q4H  PRN PO severe pain

;  Start 1/24/17 at 15:00


Hydromorphone HCl (Dilaudid) 0.4 mg Q1H  PRN IV PAIN LEVEL 6-10 Last 

administered on 1/24/17at 20:11; Admin Dose 0.4 MG;  Start 1/24/17 at 15:00


Guaifenesin (Robitussin Liquid Cup) 200 mg Q6 PO  Last administered on 1/25/ 17at 07:01; Admin Dose 200 MG;  Start 1/24/17 at 18:00


Ketoconazole (Nizoral Cr) 1 applic BID TOP  Last administered on 1/25/17at 09:07

; Admin Dose 1 APPLIC;  Start 1/24/17 at 21:00


Atorvastatin Calcium (Lipitor) 80 mg HS PO  Last administered on 1/24/17at 20:26

; Admin Dose 80 MG;  Start 1/24/17 at 21:00


Fluoxetine HCl (Prozac) 40 mg DAILY PO  Last administered on 1/25/17at 09:09; 

Admin Dose 40 MG;  Start 1/25/17 at 09:00


Clonazepam 1 mg 1 mg BID PO  Last administered on 1/25/17at 09:09; Admin Dose 1 

MG;  Start 1/24/17 at 21:00


Albumin Human 250 ml @  500 mls/hr PRN  PRN IV CVP< 8, OR SBP<90;  Start 1/24/ 17 at 15:00


Acetaminophen (Tylenol Tab) 650 mg Q3H  PRN PO ELEVATED TEMPERATURE Last 

administered on 1/24/17at 17:16; Admin Dose 650 MG;  Start 1/24/17 at 15:00


Diagnostic Test (Pha) (Accucheck) 1 ea 02 XX ;  Start 1/25/17 at 02:00


Glucose (Glutose) 15 gm Q15M  PRN PO DECREASED GLUCOSE;  Start 1/24/17 at 15:00


Glucose (Glutose) 22.5 gm Q15M  PRN PO DECREASED GLUCOSE;  Start 1/24/17 at 15:

00


Dextrose (D50w Syringe) 25 ml Q15M  PRN IV DECREASED GLUCOSE;  Start 1/24/17 at 

15:00


Dextrose (D50w Syringe) 50 ml Q15M  PRN IV DECREASED GLUCOSE;  Start 1/24/17 at 

15:00


Glucagon (Glucagen) 1 mg Q15M  PRN IM DECREASED GLUCOSE;  Start 1/24/17 at 15:00


Glucose (Glutose) 15 gm Q15M  PRN BUCCAL DECREASED GLUCOSE;  Start 1/24/17 at 15

:00


Aspirin (Aspirin) 325 mg DAILY PO  Last administered on 1/25/17at 09:09; Admin 

Dose 325 MG;  Start 1/25/17 at 09:00


Enoxaparin Sodium (Lovenox) 40 mg DAILY SC  Last administered on 1/25/17at 09:12

; Admin Dose 40 MG;  Start 1/25/17 at 09:00


Clonazepam (Klonopin) 1 mg Q8H  PRN PO ANXIETY;  Start 1/24/17 at 15:00


Albuterol/ Ipratropium (Duoneb) 3 ml PRN  PRN HHN WHEEZING AND SOB;  Start 1/24/ 17 at 15:00


Famotidine (Pepcid) 20 mg BID PO  Last administered on 1/25/17at 09:09; Admin 

Dose 20 MG;  Start 1/24/17 at 21:00


Guaifenesin/ Codeine Phosphate (Robitussin Ac Liquid Cup) 10 ml Q4H  PRN PO 

COUGH Last administered on 1/25/17at 00:20; Admin Dose 10 ML;  Start 1/24/17 at 

15:00


Hydromorphone HCl (Dilaudid) 0.2 mg Q1H  PRN IV PAIN LEVEL 1-5;  Start 1/24/17 

at 15:00











PATSY MARQUEZ MD Jan 25, 2017 10:06

## 2017-01-26 VITALS — SYSTOLIC BLOOD PRESSURE: 123 MMHG | HEART RATE: 73 BPM | DIASTOLIC BLOOD PRESSURE: 66 MMHG

## 2017-01-26 VITALS — RESPIRATION RATE: 18 BRPM | HEART RATE: 78 BPM | DIASTOLIC BLOOD PRESSURE: 56 MMHG | SYSTOLIC BLOOD PRESSURE: 113 MMHG

## 2017-01-26 VITALS — DIASTOLIC BLOOD PRESSURE: 60 MMHG | SYSTOLIC BLOOD PRESSURE: 140 MMHG | RESPIRATION RATE: 18 BRPM

## 2017-01-26 VITALS — RESPIRATION RATE: 20 BRPM | HEART RATE: 78 BPM | SYSTOLIC BLOOD PRESSURE: 95 MMHG | DIASTOLIC BLOOD PRESSURE: 60 MMHG

## 2017-01-26 VITALS — SYSTOLIC BLOOD PRESSURE: 116 MMHG | DIASTOLIC BLOOD PRESSURE: 66 MMHG | RESPIRATION RATE: 18 BRPM

## 2017-01-26 RX ADMIN — SUCRALFATE SCH GM: 1 SUSPENSION ORAL at 06:53

## 2017-01-26 RX ADMIN — FAMOTIDINE SCH MG: 20 TABLET ORAL at 09:31

## 2017-01-26 RX ADMIN — GUAIFENESIN SCH MG: 100 SOLUTION ORAL at 00:00

## 2017-01-26 RX ADMIN — HYDROCODONE BITARTRATE AND ACETAMINOPHEN PRN TAB: 5; 325 TABLET ORAL at 20:16

## 2017-01-26 RX ADMIN — TAMSULOSIN HYDROCHLORIDE SCH MG: 0.4 CAPSULE ORAL at 20:16

## 2017-01-26 RX ADMIN — SUCRALFATE SCH GM: 1 SUSPENSION ORAL at 14:09

## 2017-01-26 RX ADMIN — THERA TABS SCH TAB: TAB at 09:31

## 2017-01-26 RX ADMIN — GUAIFENESIN SCH MG: 100 SOLUTION ORAL at 18:00

## 2017-01-26 RX ADMIN — POTASSIUM CHLORIDE SCH MEQ: 20 TABLET, EXTENDED RELEASE ORAL at 09:31

## 2017-01-26 RX ADMIN — SUCRALFATE SCH GM: 1 SUSPENSION ORAL at 00:00

## 2017-01-26 RX ADMIN — HYDROMORPHONE HYDROCHLORIDE PRN MG: 1 INJECTION, SOLUTION INTRAMUSCULAR; INTRAVENOUS; SUBCUTANEOUS at 03:21

## 2017-01-26 RX ADMIN — FLUOXETINE SCH MG: 20 CAPSULE ORAL at 09:31

## 2017-01-26 RX ADMIN — ASPIRIN 325 MG ORAL TABLET SCH MG: 325 PILL ORAL at 09:29

## 2017-01-26 RX ADMIN — ATORVASTATIN CALCIUM SCH MG: 80 TABLET, FILM COATED ORAL at 20:15

## 2017-01-26 RX ADMIN — GUAIFENESIN SCH MG: 100 SOLUTION ORAL at 14:09

## 2017-01-26 RX ADMIN — METOPROLOL TARTRATE SCH MG: 25 TABLET, FILM COATED ORAL at 20:18

## 2017-01-26 RX ADMIN — SUCRALFATE SCH GM: 1 SUSPENSION ORAL at 18:17

## 2017-01-26 RX ADMIN — ENOXAPARIN SODIUM SCH MG: 100 INJECTION SUBCUTANEOUS at 09:33

## 2017-01-26 RX ADMIN — GUAIFENESIN SCH MG: 100 SOLUTION ORAL at 06:00

## 2017-01-26 RX ADMIN — FAMOTIDINE SCH MG: 20 TABLET ORAL at 20:16

## 2017-01-26 RX ADMIN — METOPROLOL TARTRATE SCH MG: 25 TABLET, FILM COATED ORAL at 09:32

## 2017-01-26 NOTE — PN
DATE:  01/26/2017

 

 

SUBJECTIVE:  Urinary incontinence and incomplete bladder emptying.  The patient does have a neurogen
ic bladder.  

 

OBJECTIVE:

VITAL SIGNS:  His temperature is 98.5, pulse is 78, respiration 18, blood pressure 113/56.  

GENITOURINARY:  He has been incontinent and has been using diapers, but today he likes to try again 
using a condom catheter, and the nurse was able to put one on him, and that has been draining well. 
 

 

LABORATORY:  Urine culture is no growth in 24 hours.  CBC shows a white count of 6.8, hemoglobin 10.
6, hematocrit 31.9.  BUN is 18, creatinine 0.79.

 

IMPRESSION:  

1.  Urinary incontinence. 

2.  Neurogenic bladder.

 

PLAN:  Put a condom catheter on him, and that was done and it is working well.  Continue the same.

 

 

Dictated By: ASHELY ESPINOZA/DIMPLE

DD:    01/26/2017 19:29:15

DT:    01/26/2017 21:13:18

Conf#: [id number]

DID#:  [id number]

## 2017-01-26 NOTE — CONS
Date/Time of Note


Date/Time of Note


DATE: 1/26/17 


TIME: 12:49





Consult Date/Type/Reason


Admit Date/Time


Jan 24, 2017 at 13:35


Initial Consult Date








Subjective


tired





Objective


pulm-cta


max assist


 Vital Signs








  Date Time  Temp Pulse Resp B/P Pulse Ox O2 Delivery O2 Flow Rate FiO2


 


1/26/17 06:53  73  123/66    


 


1/25/17 20:00      Nasal Cannula 2.0 


 


1/25/17 20:00 98.9  20  92   














 Intake and Output   


 


 1/25/17 1/25/17 1/26/17





 15:00 23:00 07:00


 


Intake Total 720 ml 240 ml 200 ml


 


Balance 720 ml 240 ml 200 ml











Results/Medications


Result Diagram:  


1/25/17 0645                                                                   

             1/25/17 0645





Results 24 hrs





Laboratory Tests








Test


  1/25/17


12:55 1/25/17


17:32 1/25/17


19:56 1/26/17


07:22


 


Bedside Glucose 172   122   120   122  














Test


  1/26/17


11:43 


  


  


 


 


Bedside Glucose 125     








Medications





 Current Medications


Trazodone HCl (Desyrel) 100 mg HS PO  Last administered on 1/25/17at 20:48; 

Admin Dose 100 MG;  Start 1/24/17 at 21:00


Tamsulosin HCl (Flomax) 0.4 mg HS PO  Last administered on 1/25/17at 20:49; 

Admin Dose 0.4 MG;  Start 1/24/17 at 21:00


Oxycodone/ Acetaminophen (Percocet (5/ 325)) 1 tab Q3H  PRN PO PAIN LEVEL 1-5;  

Start 1/24/17 at 15:00


Oxycodone/ Acetaminophen (Percocet (5/ 325)) 2 tab Q3H  PRN PO PAIN LEVEL 6-10;

  Start 1/24/17 at 15:00


Ondansetron HCl (Zofran Inj) 4 mg Q6H  PRN IV NAUSEA AND/OR VOMITING;  Start 1/ 24/17 at 15:00


Sucralfate (Carafate Susp) 1 gm Q6 PO  Last administered on 1/26/17at 06:53; 

Admin Dose 1 GM;  Start 1/24/17 at 18:00


Potassium Chloride (Klor-Con 20) 20 meq DAILY PO  Last administered on 1/26/ 17at 09:31; Admin Dose 20 MEQ;  Start 1/25/17 at 09:00


Metoprolol Tartrate (Lopressor) 25 mg BID PO  Last administered on 1/26/17at 09:

32; Admin Dose 25 MG;  Start 1/24/17 at 21:00


Multivitamins Therapeutic (Theragran) 1 tab DAILY PO  Last administered on 1/26/ 17at 09:31; Admin Dose 1 TAB;  Start 1/25/17 at 09:00


Miscellaneous Information 1 ea NOTE XX ;  Start 1/24/17 at 15:00


Acetaminophen/ Hydrocodone Bitart (Norco (5/325)) 1 tab Q4H  PRN PO severe pain

;  Start 1/24/17 at 15:00


Hydromorphone HCl (Dilaudid) 0.4 mg Q1H  PRN IV PAIN LEVEL 6-10 Last 

administered on 1/26/17at 03:21; Admin Dose 0.4 MG;  Start 1/24/17 at 15:00


Guaifenesin (Robitussin Liquid Cup) 200 mg Q6 PO  Last administered on 1/25/ 17at 12:58; Admin Dose 200 MG;  Start 1/24/17 at 18:00


Ketoconazole (Nizoral Cr) 1 applic BID TOP  Last administered on 1/26/17at 09:34

; Admin Dose 1 APPLIC;  Start 1/24/17 at 21:00


Atorvastatin Calcium (Lipitor) 80 mg HS PO  Last administered on 1/25/17at 20:50

; Admin Dose 80 MG;  Start 1/24/17 at 21:00


Fluoxetine HCl (Prozac) 40 mg DAILY PO  Last administered on 1/26/17at 09:31; 

Admin Dose 40 MG;  Start 1/25/17 at 09:00


Clonazepam 1 mg 1 mg BID PO  Last administered on 1/26/17at 09:30; Admin Dose 1 

MG;  Start 1/24/17 at 21:00


Albumin Human 250 ml @  500 mls/hr PRN  PRN IV CVP< 8, OR SBP<90;  Start 1/24/ 17 at 15:00


Acetaminophen (Tylenol Tab) 650 mg Q3H  PRN PO ELEVATED TEMPERATURE Last 

administered on 1/24/17at 17:16; Admin Dose 650 MG;  Start 1/24/17 at 15:00


Diagnostic Test (Pha) (Accucheck) 1 ea 02 XX ;  Start 1/25/17 at 02:00


Glucose (Glutose) 15 gm Q15M  PRN PO DECREASED GLUCOSE;  Start 1/24/17 at 15:00


Glucose (Glutose) 22.5 gm Q15M  PRN PO DECREASED GLUCOSE;  Start 1/24/17 at 15:

00


Dextrose (D50w Syringe) 25 ml Q15M  PRN IV DECREASED GLUCOSE;  Start 1/24/17 at 

15:00


Dextrose (D50w Syringe) 50 ml Q15M  PRN IV DECREASED GLUCOSE;  Start 1/24/17 at 

15:00


Glucagon (Glucagen) 1 mg Q15M  PRN IM DECREASED GLUCOSE;  Start 1/24/17 at 15:00


Glucose (Glutose) 15 gm Q15M  PRN BUCCAL DECREASED GLUCOSE;  Start 1/24/17 at 15

:00


Aspirin (Aspirin) 325 mg DAILY PO  Last administered on 1/26/17at 09:29; Admin 

Dose 325 MG;  Start 1/25/17 at 09:00


Enoxaparin Sodium (Lovenox) 40 mg DAILY SC  Last administered on 1/26/17at 09:33

; Admin Dose 40 MG;  Start 1/25/17 at 09:00


Clonazepam (Klonopin) 1 mg Q8H  PRN PO ANXIETY;  Start 1/24/17 at 15:00


Albuterol/ Ipratropium (Duoneb) 3 ml PRN  PRN HHN WHEEZING AND SOB;  Start 1/24/ 17 at 15:00


Famotidine (Pepcid) 20 mg BID PO  Last administered on 1/26/17at 09:31; Admin 

Dose 20 MG;  Start 1/24/17 at 21:00


Guaifenesin/ Codeine Phosphate (Robitussin Ac Liquid Cup) 10 ml Q4H  PRN PO 

COUGH Last administered on 1/26/17at 03:22; Admin Dose 10 ML;  Start 1/24/17 at 

15:00


Hydromorphone HCl (Dilaudid) 0.2 mg Q1H  PRN IV PAIN LEVEL 1-5;  Start 1/24/17 

at 15:00


Phenol (Cepastat Lozenge) 1 lozenge Q4  PRN PO dry mouth;  Start 1/26/17 at 09:

30





Assessment/Plan


Additional Assessment/Plan


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  History of cervical radiculopathy and 

neuropathy.


   Activities as tolerated


 Status post respiratory failure.


Status post non-ST elevation myocardial infarction and coronary artery bypass 

graft.


Dysphagia, on mechanical soft diet.


Fungal cellulitis.


Benign prostatic hypertrophy.


 History of anxiety and depression.











GABI MERCER MD Jan 26, 2017 12:50

## 2017-01-26 NOTE — CONS
Date/Time of Note


Date/Time of Note


DATE: 1/26/17 


TIME: 14:48





Assessment/Plan


Assessment/Plan


Chief Complaint/Hosp Course


assessment/impression


- s/p postoperative SIRS with fever and leukocytosis vs early sepsis d/t 

bronchitis +/- HCAP, pericarditis (Sputum cx grew C. albicans but no thrush 

noted); s/p 8 day course of empiric pip/tazo for HCAP.


- possible pericarditis, either post-NSTEMI and post-CABG pericarditis or viral 

pericarditis (respiratory viruses).  Procalcitonin 0.42 on 1/11/17.


- s/p persistent leukocytosis- resolved


- NSTEMI


- s/p CABG on 1/6/2017


- HTN


- perineal rash - on Nizoral cream


- urinary incontinence


- debility - continue rehab


- Hx arthroplasty of hip


- s/p tamiflu (1/8/17-1/12/17; influenza RT-PCR negative), Vanco (1/8-1/15), 

and pip/tazo (1/8-1/16)





Recommendations: 


- monitor closely off abx





- Above d/w Dr. Reza


Problems:  





Consultation Date/Type/Reason


Admit Date/Time


Jan 24, 2017 at 13:35


Initial Consult Date








24 HR Interval Summary


Free Text/Dictation


Condom cath was leaking and removed, pt seen by Dr. Craven and pt requesting 

to have condom cath replaced; perineal rash slowly improving per LINDSAY Ortiz.


Participated with therapy today, ambulated short distance and got up in chair.  

No f/c.  Denies SOB, CP, abd pain, n/v/d.  Has chronic dysuria.





Exam/Review of Systems


Vital Signs


Vitals





 Vital Signs








  Date Time  Temp Pulse Resp B/P Pulse Ox O2 Delivery O2 Flow Rate FiO2


 


1/26/17 06:53  73  123/66    


 


1/25/17 20:00      Nasal Cannula 2.0 


 


1/25/17 20:00 98.9  20  92   














 Intake and Output   


 


 1/25/17 1/25/17 1/26/17





 15:00 23:00 07:00


 


Intake Total 720 ml 240 ml 200 ml


 


Balance 720 ml 240 ml 200 ml











Exam


Constitutional:  alert, oriented, well developed


Psych:  nl mood/affect


Head:  atraumatic, normocephalic


Neck:  supple, 


   No jvd


Respiratory:  clear to auscultation (anteriorly), diminished breath sounds (at 

bases), 


   No wheezing


Cardiovascular:  nl pulses, regular rate and rhythm


Gastrointestinal:  bowel sounds, non-tender, soft


Genitourinary - Male:  nl penis, nl scrotum, other (perineal rash - slightly 

improved compared to a few days ago)


Extremities:  normal pulses, 


   No clubbing, No cyanosis


Neurological:  nl mental status, other (speech somewhat soft)


Skin:  nl turgor, other (Right wrist skin tear with dressing c/d/i; left medial 

thigh with dressing c/d/i))





Results


Result Diagram:  


1/25/17 0645                                                                   

             1/25/17 0645





Results 24 hrs





Laboratory Tests








Test


  1/25/17


17:32 1/25/17


19:56 1/26/17


07:22 1/26/17


11:43


 


Bedside Glucose 122   120   122   125  











Medications


Medications





 Current Medications


Trazodone HCl (Desyrel) 100 mg HS PO  Last administered on 1/25/17at 20:48; 

Admin Dose 100 MG;  Start 1/24/17 at 21:00


Tamsulosin HCl (Flomax) 0.4 mg HS PO  Last administered on 1/25/17at 20:49; 

Admin Dose 0.4 MG;  Start 1/24/17 at 21:00


Oxycodone/ Acetaminophen (Percocet (5/ 325)) 1 tab Q3H  PRN PO PAIN LEVEL 1-5;  

Start 1/24/17 at 15:00


Oxycodone/ Acetaminophen (Percocet (5/ 325)) 2 tab Q3H  PRN PO PAIN LEVEL 6-10;

  Start 1/24/17 at 15:00


Ondansetron HCl (Zofran Inj) 4 mg Q6H  PRN IV NAUSEA AND/OR VOMITING;  Start 1/ 24/17 at 15:00


Sucralfate (Carafate Susp) 1 gm Q6 PO  Last administered on 1/26/17at 14:09; 

Admin Dose 1 GM;  Start 1/24/17 at 18:00


Potassium Chloride (Klor-Con 20) 20 meq DAILY PO  Last administered on 1/26/ 17at 09:31; Admin Dose 20 MEQ;  Start 1/25/17 at 09:00


Metoprolol Tartrate (Lopressor) 25 mg BID PO  Last administered on 1/26/17at 09:

32; Admin Dose 25 MG;  Start 1/24/17 at 21:00


Multivitamins Therapeutic (Theragran) 1 tab DAILY PO  Last administered on 1/26/ 17at 09:31; Admin Dose 1 TAB;  Start 1/25/17 at 09:00


Miscellaneous Information 1 ea NOTE XX ;  Start 1/24/17 at 15:00


Acetaminophen/ Hydrocodone Bitart (Norco (5/325)) 1 tab Q4H  PRN PO severe pain

;  Start 1/24/17 at 15:00


Hydromorphone HCl (Dilaudid) 0.4 mg Q1H  PRN IV PAIN LEVEL 6-10 Last 

administered on 1/26/17at 03:21; Admin Dose 0.4 MG;  Start 1/24/17 at 15:00


Guaifenesin (Robitussin Liquid Cup) 200 mg Q6 PO  Last administered on 1/26/ 17at 14:09; Admin Dose 200 MG;  Start 1/24/17 at 18:00


Ketoconazole (Nizoral Cr) 1 applic BID TOP  Last administered on 1/26/17at 09:34

; Admin Dose 1 APPLIC;  Start 1/24/17 at 21:00


Atorvastatin Calcium (Lipitor) 80 mg HS PO  Last administered on 1/25/17at 20:50

; Admin Dose 80 MG;  Start 1/24/17 at 21:00


Fluoxetine HCl (Prozac) 40 mg DAILY PO  Last administered on 1/26/17at 09:31; 

Admin Dose 40 MG;  Start 1/25/17 at 09:00


Clonazepam 1 mg 1 mg BID PO  Last administered on 1/26/17at 09:30; Admin Dose 1 

MG;  Start 1/24/17 at 21:00


Albumin Human 250 ml @  500 mls/hr PRN  PRN IV CVP< 8, OR SBP<90;  Start 1/24/ 17 at 15:00


Acetaminophen (Tylenol Tab) 650 mg Q3H  PRN PO ELEVATED TEMPERATURE Last 

administered on 1/24/17at 17:16; Admin Dose 650 MG;  Start 1/24/17 at 15:00


Diagnostic Test (Pha) (Accucheck) 1 ea 02 XX ;  Start 1/25/17 at 02:00


Glucose (Glutose) 15 gm Q15M  PRN PO DECREASED GLUCOSE;  Start 1/24/17 at 15:00


Glucose (Glutose) 22.5 gm Q15M  PRN PO DECREASED GLUCOSE;  Start 1/24/17 at 15:

00


Dextrose (D50w Syringe) 25 ml Q15M  PRN IV DECREASED GLUCOSE;  Start 1/24/17 at 

15:00


Dextrose (D50w Syringe) 50 ml Q15M  PRN IV DECREASED GLUCOSE;  Start 1/24/17 at 

15:00


Glucagon (Glucagen) 1 mg Q15M  PRN IM DECREASED GLUCOSE;  Start 1/24/17 at 15:00


Glucose (Glutose) 15 gm Q15M  PRN BUCCAL DECREASED GLUCOSE;  Start 1/24/17 at 15

:00


Aspirin (Aspirin) 325 mg DAILY PO  Last administered on 1/26/17at 09:29; Admin 

Dose 325 MG;  Start 1/25/17 at 09:00


Enoxaparin Sodium (Lovenox) 40 mg DAILY SC  Last administered on 1/26/17at 09:33

; Admin Dose 40 MG;  Start 1/25/17 at 09:00


Clonazepam (Klonopin) 1 mg Q8H  PRN PO ANXIETY;  Start 1/24/17 at 15:00


Albuterol/ Ipratropium (Duoneb) 3 ml PRN  PRN HHN WHEEZING AND SOB;  Start 1/24/ 17 at 15:00


Famotidine (Pepcid) 20 mg BID PO  Last administered on 1/26/17at 09:31; Admin 

Dose 20 MG;  Start 1/24/17 at 21:00


Guaifenesin/ Codeine Phosphate (Robitussin Ac Liquid Cup) 10 ml Q4H  PRN PO 

COUGH Last administered on 1/26/17at 03:22; Admin Dose 10 ML;  Start 1/24/17 at 

15:00


Hydromorphone HCl (Dilaudid) 0.2 mg Q1H  PRN IV PAIN LEVEL 1-5;  Start 1/24/17 

at 15:00


Phenol (Cepastat Lozenge) 1 lozenge Q4  PRN PO dry mouth;  Start 1/26/17 at 09:

30





Procedures


Procedures


CXR 1/20/17:  Mild cardiomegaly


LLL retrocardiac density (consolidation/atelectasis)


Small left pleural effusion











CHACHO GARZA NP Jan 26, 2017 14:56

## 2017-01-26 NOTE — PN
DATE:  01/26/2017

 

 

SUBJECTIVE:  The patient is stable, no acute events overnight.  The patient is complaining about dry
 mouth, otherwise no other events.

 

OBJECTIVE:

VITAL SIGNS:  Blood pressure 123/66, respirations 20, pulse 73, temperature 98.9.

HEENT:  Head is normocephalic.

NECK:  Supple.

HEART:  Regular rate.

LUNGS:  Show diminished breath sounds at bases.

ABDOMEN:  Soft, nontender to palpation.  No rebound or guarding.

EXTREMITIES:  Negative for clubbing, cyanosis.  No edema.

MUSCULOSKELETAL:  No joint effusions.

NEUROLOGIC:  General weakness upper and lower extremity, no focal deficits.

DERMATOLOGIC:  The patient has noted groin erythema and excoriations.

 

LABORATORY DATA:  Reviewed.  No new labs.

 

ASSESSMENT AND PLAN:

1.  Non-ST elevation myocardial infarction, status post coronary artery bypass graft on 01/06/_____.
  The patient is hemodynamically stable.  Continue current medical management.

2.  Acute diastolic heart failure.  The patient is near euvolemic status.  Continue medical manageme
nt.

3.  Status post pneumonia and bronchitis.  The patient has completed antibiotic course.

4.  Hypertension.  Current blood pressure regimen.

5.  Dry mouth.  Will start the patient on lozenges and monitor.

6.  Acute hypoxemic respiratory failure secondary to congestive heart failure and pneumonia.  The pa
francisca is clinically improving.  Continue to wean off oxygen.

7.  Intensive Care Unit myopathy.  The patient is clinically improving.  Continue physical therapy a
nd occupational therapy.

8.  Anemia.  Continue to monitor hemoglobin and hematocrit levels.

9.  Benign prostatic hypertrophy with urinary retention.  The patient is on Flomax.  Appreciate Urol
ogy's evaluation.

10.  Fungal cellulitis, improving.  Continue Diflucan cream.

11.  Dyslipidemia.  Continue statin therapy.

12.  Depression, anxiety disorder.  Continue Prozac and Klonopin. 

13.  Neuropath, both upper and lower extremity weakness from previous surgery.  Continue physical th
erapy.

14.  Gastrointestinal and deep venous thrombosis prophylaxis.  Continue proton pump inhibitor and Lo
venox.

 

 

Dictated By: CHAVA GARZA DO

 

NR/DIMPLE

DD:    01/26/2017 09:25:42

DT:    01/26/2017 11:44:30

Conf#: [id number]

DID#:  [id number]

## 2017-01-27 VITALS — DIASTOLIC BLOOD PRESSURE: 55 MMHG | HEART RATE: 87 BPM | SYSTOLIC BLOOD PRESSURE: 92 MMHG

## 2017-01-27 VITALS — DIASTOLIC BLOOD PRESSURE: 89 MMHG | SYSTOLIC BLOOD PRESSURE: 186 MMHG | HEART RATE: 84 BPM

## 2017-01-27 VITALS — DIASTOLIC BLOOD PRESSURE: 56 MMHG | RESPIRATION RATE: 18 BRPM | SYSTOLIC BLOOD PRESSURE: 118 MMHG

## 2017-01-27 VITALS — SYSTOLIC BLOOD PRESSURE: 156 MMHG | HEART RATE: 88 BPM | DIASTOLIC BLOOD PRESSURE: 74 MMHG

## 2017-01-27 VITALS — RESPIRATION RATE: 18 BRPM | SYSTOLIC BLOOD PRESSURE: 137 MMHG | DIASTOLIC BLOOD PRESSURE: 63 MMHG

## 2017-01-27 VITALS — SYSTOLIC BLOOD PRESSURE: 143 MMHG | DIASTOLIC BLOOD PRESSURE: 76 MMHG

## 2017-01-27 LAB
ANION GAP SERPL CALC-SCNC: 16 MMOL/L (ref 8–16)
BASOPHILS # BLD AUTO: 0 10^3/UL (ref 0–0.1)
BASOPHILS NFR BLD: 0.3 % (ref 0–2)
BUN SERPL-MCNC: 20 MG/DL (ref 7–20)
CALCIUM SERPL-MCNC: 8.8 MG/DL (ref 8.4–10.2)
CHLORIDE SERPL-SCNC: 97 MMOL/L (ref 97–110)
CO2 SERPL-SCNC: 31 MMOL/L (ref 21–31)
CONDITION: 1
CREAT SERPL-MCNC: 0.93 MG/DL (ref 0.61–1.24)
EOSINOPHIL # BLD: 0 10^3/UL (ref 0–0.5)
EOSINOPHIL NFR BLD: 0.1 % (ref 0–7)
ERYTHROCYTE [DISTWIDTH] IN BLOOD BY AUTOMATED COUNT: 13.9 % (ref 11.5–14.5)
GLUCOSE SERPL-MCNC: 88 MG/DL (ref 70–220)
HCT VFR BLD CALC: 29.8 % (ref 42–52)
HGB BLD-MCNC: 10.1 G/DL (ref 14–18)
LH ANALYZER COMMENTS: 1
LYMPHOCYTES # BLD AUTO: 1 10^3/UL (ref 0.8–2.9)
LYMPHOCYTES NFR BLD AUTO: 20.8 % (ref 15–51)
MAGNESIUM SERPL-MCNC: 2 MG/DL (ref 1.7–2.5)
MCH RBC QN AUTO: 29.2 PG (ref 29–33)
MCHC RBC AUTO-ENTMCNC: 34 G/DL (ref 32–37)
MCV RBC AUTO: 86 FL (ref 82–101)
MONOCYTES # BLD: 0.9 10^3/UL (ref 0.3–0.9)
MONOCYTES NFR BLD: 18.7 % (ref 0–11)
NEUTROPHILS # BLD: 2.8 10^3/UL (ref 1.6–7.5)
NEUTROPHILS NFR BLD AUTO: 60.1 % (ref 39–77)
NRBC # BLD MANUAL: 0 10^3/UL (ref 0–0)
NRBC BLD QL: 0 /100WBC (ref 0–0)
PHOSPHATE SERPL-MCNC: 3.6 MG/DL (ref 2.5–4.9)
PLATELET # BLD: 291 10^3/UL (ref 140–440)
PMV BLD AUTO: 9.3 FL (ref 7.4–10.4)
POTASSIUM SERPL-SCNC: 3.5 MMOL/L (ref 3.5–5.1)
RBC # BLD AUTO: 3.47 10^6/UL (ref 4.7–6.1)
SODIUM SERPL-SCNC: 140 MMOL/L (ref 135–144)
WBC # BLD AUTO: 4.6 10^3/UL (ref 4.8–10.8)
WBC # BLD: 4.6 10^3/UL (ref 4.8–10.8)

## 2017-01-27 RX ADMIN — SUCRALFATE SCH GM: 1 SUSPENSION ORAL at 17:24

## 2017-01-27 RX ADMIN — THERA TABS SCH TAB: TAB at 09:58

## 2017-01-27 RX ADMIN — TAMSULOSIN HYDROCHLORIDE SCH MG: 0.4 CAPSULE ORAL at 20:26

## 2017-01-27 RX ADMIN — FAMOTIDINE SCH MG: 20 TABLET ORAL at 20:28

## 2017-01-27 RX ADMIN — GUAIFENESIN SCH MG: 100 SOLUTION ORAL at 12:00

## 2017-01-27 RX ADMIN — METOPROLOL TARTRATE SCH MG: 25 TABLET, FILM COATED ORAL at 10:00

## 2017-01-27 RX ADMIN — SUCRALFATE SCH GM: 1 SUSPENSION ORAL at 05:37

## 2017-01-27 RX ADMIN — ATORVASTATIN CALCIUM SCH MG: 80 TABLET, FILM COATED ORAL at 20:26

## 2017-01-27 RX ADMIN — GUAIFENESIN SCH MG: 100 SOLUTION ORAL at 17:25

## 2017-01-27 RX ADMIN — POTASSIUM CHLORIDE SCH MEQ: 20 TABLET, EXTENDED RELEASE ORAL at 09:58

## 2017-01-27 RX ADMIN — SUCRALFATE SCH GM: 1 SUSPENSION ORAL at 00:00

## 2017-01-27 RX ADMIN — GUAIFENESIN SCH MG: 100 SOLUTION ORAL at 00:00

## 2017-01-27 RX ADMIN — GUAIFENESIN SCH MG: 100 SOLUTION ORAL at 05:37

## 2017-01-27 RX ADMIN — METOPROLOL TARTRATE SCH MG: 25 TABLET, FILM COATED ORAL at 20:28

## 2017-01-27 RX ADMIN — ASPIRIN 325 MG ORAL TABLET SCH MG: 325 PILL ORAL at 09:57

## 2017-01-27 RX ADMIN — FAMOTIDINE SCH MG: 20 TABLET ORAL at 09:58

## 2017-01-27 RX ADMIN — ENOXAPARIN SODIUM SCH MG: 100 INJECTION SUBCUTANEOUS at 09:59

## 2017-01-27 RX ADMIN — SUCRALFATE SCH GM: 1 SUSPENSION ORAL at 13:49

## 2017-01-27 RX ADMIN — FLUOXETINE SCH MG: 20 CAPSULE ORAL at 09:57

## 2017-01-27 NOTE — PN
DATE:  01/27/2017

 

 

SUBJECTIVE:  The patient is stable.  No acute events overnight. No hemoptysis, hemetemesis or hemato
chezia.  

 

OBJECTIVE:

VITAL SIGNS:  Blood pressure is 156/74, respirations 18, pulse 78, temperature 98.2.

HEENT:  Normocephalic.

NECK:  Supple.

HEART:  Regular rate.  Chest shows a dressing over a sternotomy wound, clean, dry, and intact.

ABDOMEN:  Soft, nontender to palpation.  No rebound or guarding.

EXTREMITIES:  Negative for clubbing or cyanosis.  Positive edema.

DERMATOLOGIC:  No rashes.

GENITOURINARY EXAM:  Positive for _____ catheter.

NEUROLOGIC:  Limited exam.  No significant change.

 

LABORATORY DATA:  Showed a white count of 4.6, hemoglobin 10.1, hematocrit 29.8, platelet count 291.
  BMP within normal limits.

 

ASSESSMENT AND PLAN:

1.  Non-ST elevation myocardial infarction, status post CABG on 01/06/2017.  The patient has been st
able.  Continue medical management.

2.  Acute diastolic heart failure.  The patient is near euvolemic status.  Continue medical manageme
nt.  Continue diuretic therapy.

3.  Hypertension, with episodes of orthostasis.  Continue to monitor blood pressure closely. If the 
patient continues to have orthostatic episodes, will deescalate diuretic therapy.

4.  Status post pneumonia/bronchitis.  Patient is completing an antibiotic course.

5.  Status post respiratory failure. 

6.  ICU myopathy. Continue medical management.  Continue PT, OT.

7.  Anemia.  Continue to monitor hemoglobin and hematocrit levels.

8.  Benign prostatic hypertrophy.  The patient is on Flomax.  Appreciate urology evaluation.

9.  Cellulitis, fungal.  Improving.  Continue Diflucan.

10.  Dyslipidemia. Continue statin therapy.

11.  Depression and anxiety disorder. Continue Prozac and Klonopin.

12.  Neuropathy of upper and lower extremities.  Continue physical therapy.

13.  Gastrointestinal and deep venous thrombosis prophylaxis.  Continue proton pump inhibitor and Lo
venox.

 

 

Dictated By: CHAVA VELOZ/DIMPLE

DD:    01/27/2017 09:39:09

DT:    01/27/2017 10:45:03

Conf#: [id number]

DID#:  [id number]

## 2017-01-27 NOTE — CONS
Date/Time of Note


Date/Time of Note


DATE: 1/27/17 


TIME: 11:05





Consult Date/Type/Reason


Admit Date/Time


Jan 24, 2017 at 13:35





Subjective


Patient with orthostatic hypotension





Objective


pulm-cta


max transfer


 Vital Signs








  Date Time  Temp Pulse Resp B/P Pulse Ox O2 Delivery O2 Flow Rate FiO2


 


1/27/17 09:13  88  156/74    


 


1/27/17 07:39 98.2  18  96   


 


1/26/17 20:00      Nasal Cannula 2.0 














 Intake and Output   


 


 1/26/17 1/26/17 1/27/17





 15:00 23:00 07:00


 


Intake Total  520 ml 180 ml


 


Output Total 200 ml  


 


Balance -200 ml 520 ml 180 ml











Results/Medications


Result Diagram:  


1/27/17 0620                                                                   

             1/27/17 0620





Results 24 hrs





Laboratory Tests








Test


  1/26/17


11:43 1/26/17


16:59 1/26/17


20:27 1/27/17


06:20


 


Bedside Glucose 125   113   105   


 


Anion Gap    16  


 


Basophils #    0.0  


 


Basophils %    0.3  


 


Blood Urea Nitrogen    20  


 


Calcium Level    8.8  


 


Carbon Dioxide Level    31  


 


Chloride Level    97  


 


Creatinine    0.93  


 


Eosinophils #    0.0  


 


Eosinophils %    0.1  


 


Glucose Level    88  


 


Hematocrit    29.8  L


 


Hemoglobin    10.1  L


 


Lymphocytes #    1.0  


 


Lymphocytes %    20.8  


 


Magnesium Level    2.0  


 


Mean Corpuscular Hemoglobin    29.2  


 


Mean Corpuscular Hemoglobin


Concent 


  


  


  34.0  


 


 


Mean Corpuscular Volume    86.0  


 


Mean Platelet Volume    9.3  


 


Monocytes #    0.9  


 


Monocytes %    18.7  H


 


Neutrophils #    2.8  


 


Neutrophils %    60.1  


 


Nucleated Red Blood Cells #    0.0  


 


Nucleated Red Blood Cells %    0.0  


 


Phosphorus Level    3.6  


 


Platelet Count    291  


 


Potassium Level    3.5  


 


Red Blood Count    3.47  L


 


Red Cell Distribution Width    13.9  


 


Sodium Level    140  


 


White Blood Count    4.6  #L














Test


  1/27/17


07:32 


  


  


 


 


Bedside Glucose 94     








Medications





 Current Medications


Trazodone HCl (Desyrel) 100 mg HS PO  Last administered on 1/26/17at 20:15; 

Admin Dose 100 MG;  Start 1/24/17 at 21:00


Tamsulosin HCl (Flomax) 0.4 mg HS PO  Last administered on 1/26/17at 20:16; 

Admin Dose 0.4 MG;  Start 1/24/17 at 21:00


Oxycodone/ Acetaminophen (Percocet (5/ 325)) 1 tab Q3H  PRN PO PAIN LEVEL 1-5;  

Start 1/24/17 at 15:00


Oxycodone/ Acetaminophen (Percocet (5/ 325)) 2 tab Q3H  PRN PO PAIN LEVEL 6-10 

Last administered on 1/27/17at 02:17; Admin Dose 2 TAB;  Start 1/24/17 at 15:00


Ondansetron HCl (Zofran Inj) 4 mg Q6H  PRN IV NAUSEA AND/OR VOMITING;  Start 1/ 24/17 at 15:00


Sucralfate (Carafate Susp) 1 gm Q6 PO  Last administered on 1/27/17at 05:37; 

Admin Dose 1 GM;  Start 1/24/17 at 18:00


Potassium Chloride (Klor-Con 20) 20 meq DAILY PO  Last administered on 1/27/ 17at 09:58; Admin Dose 20 MEQ;  Start 1/25/17 at 09:00


Multivitamins Therapeutic (Theragran) 1 tab DAILY PO  Last administered on 1/27/ 17at 09:58; Admin Dose 1 TAB;  Start 1/25/17 at 09:00


Miscellaneous Information 1 ea NOTE XX ;  Start 1/24/17 at 15:00


Acetaminophen/ Hydrocodone Bitart (Norco (5/325)) 1 tab Q4H  PRN PO severe pain 

Last administered on 1/26/17at 20:16; Admin Dose 1 TAB;  Start 1/24/17 at 15:00


Hydromorphone HCl (Dilaudid) 0.4 mg Q1H  PRN IV PAIN LEVEL 6-10 Last 

administered on 1/26/17at 03:21; Admin Dose 0.4 MG;  Start 1/24/17 at 15:00


Guaifenesin (Robitussin Liquid Cup) 200 mg Q6 PO  Last administered on 1/27/ 17at 05:37; Admin Dose 200 MG;  Start 1/24/17 at 18:00


Ketoconazole (Nizoral Cr) 1 applic BID TOP  Last administered on 1/27/17at 09:58

; Admin Dose 1 APPLIC;  Start 1/24/17 at 21:00


Atorvastatin Calcium (Lipitor) 80 mg HS PO  Last administered on 1/26/17at 20:15

; Admin Dose 80 MG;  Start 1/24/17 at 21:00


Fluoxetine HCl (Prozac) 40 mg DAILY PO  Last administered on 1/27/17at 09:57; 

Admin Dose 40 MG;  Start 1/25/17 at 09:00


Clonazepam 1 mg 1 mg BID PO  Last administered on 1/27/17at 09:57; Admin Dose 1 

MG;  Start 1/24/17 at 21:00


Albumin Human 250 ml @  500 mls/hr PRN  PRN IV CVP< 8, OR SBP<90;  Start 1/24/ 17 at 15:00


Acetaminophen (Tylenol Tab) 650 mg Q3H  PRN PO ELEVATED TEMPERATURE Last 

administered on 1/24/17at 17:16; Admin Dose 650 MG;  Start 1/24/17 at 15:00


Diagnostic Test (Pha) (Accucheck) 1 ea 02 XX ;  Start 1/25/17 at 02:00


Glucose (Glutose) 15 gm Q15M  PRN PO DECREASED GLUCOSE;  Start 1/24/17 at 15:00


Glucose (Glutose) 22.5 gm Q15M  PRN PO DECREASED GLUCOSE;  Start 1/24/17 at 15:

00


Dextrose (D50w Syringe) 25 ml Q15M  PRN IV DECREASED GLUCOSE;  Start 1/24/17 at 

15:00


Dextrose (D50w Syringe) 50 ml Q15M  PRN IV DECREASED GLUCOSE;  Start 1/24/17 at 

15:00


Glucagon (Glucagen) 1 mg Q15M  PRN IM DECREASED GLUCOSE;  Start 1/24/17 at 15:00


Glucose (Glutose) 15 gm Q15M  PRN BUCCAL DECREASED GLUCOSE;  Start 1/24/17 at 15

:00


Aspirin (Aspirin) 325 mg DAILY PO  Last administered on 1/27/17at 09:57; Admin 

Dose 325 MG;  Start 1/25/17 at 09:00


Enoxaparin Sodium (Lovenox) 40 mg DAILY SC  Last administered on 1/27/17at 09:59

; Admin Dose 40 MG;  Start 1/25/17 at 09:00


Clonazepam (Klonopin) 1 mg Q8H  PRN PO ANXIETY;  Start 1/24/17 at 15:00


Albuterol/ Ipratropium (Duoneb) 3 ml PRN  PRN HHN WHEEZING AND SOB;  Start 1/24/ 17 at 15:00


Famotidine (Pepcid) 20 mg BID PO  Last administered on 1/27/17at 09:58; Admin 

Dose 20 MG;  Start 1/24/17 at 21:00


Guaifenesin/ Codeine Phosphate (Robitussin Ac Liquid Cup) 10 ml Q4H  PRN PO 

COUGH Last administered on 1/27/17at 10:11; Admin Dose 10 ML;  Start 1/24/17 at 

15:00


Hydromorphone HCl (Dilaudid) 0.2 mg Q1H  PRN IV PAIN LEVEL 1-5;  Start 1/24/17 

at 15:00


Phenol (Cepastat Lozenge) 1 lozenge Q4  PRN PO dry mouth;  Start 1/26/17 at 09:

30


Magnesium Hydroxide (Milk Of Mag) 30 ml DAILY  PRN PO CONSTIPATION Last 

administered on 1/27/17at 10:01; Admin Dose 30 ML;  Start 1/27/17 at 08:30


Metoprolol Tartrate (Lopressor) 12.5 mg BID PO ;  Start 1/27/17 at 10:00





Assessment/Plan


Additional Assessment/Plan


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  History of cervical radiculopathy and 

neuropathy.


   Activities as tolerated, monitor closely for orthostatic hypotension. GREGORIO 

hose for OOB


 Status post respiratory failure.


Status post non-ST elevation myocardial infarction and coronary artery bypass 

graft.


Dysphagia, on mechanical soft diet.


Fungal cellulitis.


Benign prostatic hypertrophy.


 History of anxiety and depression.











GABI MERCER MD Jan 27, 2017 11:06

## 2017-01-27 NOTE — CONS
Date/Time of Note


Date/Time of Note


DATE: 1/27/17 


TIME: 11:08





Assessment/Plan


Assessment/Plan


Chief Complaint/Hosp Course


assessment/impression


- s/p postoperative SIRS with fever and leukocytosis vs early sepsis d/t 

bronchitis +/- HCAP, pericarditis (Sputum cx grew C. albicans but no thrush 

noted); s/p 8 day course of empiric pip/tazo for HCAP.


- possible pericarditis, either post-NSTEMI and post-CABG pericarditis or viral 

pericarditis (respiratory viruses).  Procalcitonin 0.42 on 1/11/17.


- s/p persistent leukocytosis- resolved (repeat UA negative and urine cx 

negative to date)


- NSTEMI


- s/p CABG on 1/6/2017


- HTN with episode of orthostatic hypotension - BB dose reduced


- perineal rash - improving with Nizoral cream


- urinary incontinence - using condom cath


- debility - continue rehab


- Hx arthroplasty of hip


- s/p tamiflu (1/8/17-1/12/17; influenza RT-PCR negative), Vanco (1/8-1/15), 

and pip/tazo (1/8-1/16)





Recommendations: 


- Monitor closely off abx


- Will sign off. Please re-consult us PRN for any ID concerns.





- Above d/w Dr. Reza


Problems:  





Consultation Date/Type/Reason


Admit Date/Time


Jan 24, 2017 at 13:35


Type of Consultation:  Infectious Disease





24 HR Interval Summary


Free Text/Dictation


Pt with orthostatic hypotension this AM and metoprolol dose was reduced per RN 

Ashish.


Had dizziness when getting OOB earlier. Currently denies dizziness, HA, fever, 

chills, CP, SOB, abd pain, n/v/d. Has chronic dysuria.





Exam/Review of Systems


Vital Signs


Vitals





 Vital Signs








  Date Time  Temp Pulse Resp B/P Pulse Ox O2 Delivery O2 Flow Rate FiO2


 


1/27/17 09:13  88  156/74    


 


1/27/17 07:39 98.2  18  96   


 


1/26/17 20:00      Nasal Cannula 2.0 














 Intake and Output   


 


 1/26/17 1/26/17 1/27/17





 15:00 23:00 07:00


 


Intake Total  520 ml 180 ml


 


Output Total 200 ml  


 


Balance -200 ml 520 ml 180 ml











Exam


Constitutional:  alert, oriented, well developed


Psych:  nl mood/affect


Head:  atraumatic, normocephalic


Neck:  supple, No jvd


Respiratory:  clear to auscultation (anteriorly), No wheezing


Cardiovascular:  nl pulses, regular rate and rhythm


Gastrointestinal:  bowel sounds, non-tender, soft


Genitourinary - Male:  nl penis, nl scrotum, other (perineal rash - improved 

compared to yesterday; condom cath intact with clear yellow urine)


Extremities:  normal pulses, other (wearing BLE Bran hose)


   No clubbing, No cyanosis


Neurological:  nl mental status, other (speech somewhat soft)


Skin:  nl turgor, other (Right wrist skin tear with dressing c/d/i; left medial 

thigh with dressing c/d/i))





Results


Result Diagram:  


1/27/17 0620                                                                   

             1/27/17 0620





Results 24 hrs





Laboratory Tests








Test


  1/26/17


11:43 1/26/17


16:59 1/26/17


20:27 1/27/17


06:20


 


Bedside Glucose 125   113   105   


 


Anion Gap    16  


 


Basophils #    0.0  


 


Basophils %    0.3  


 


Blood Urea Nitrogen    20  


 


Calcium Level    8.8  


 


Carbon Dioxide Level    31  


 


Chloride Level    97  


 


Creatinine    0.93  


 


Eosinophils #    0.0  


 


Eosinophils %    0.1  


 


Glucose Level    88  


 


Hematocrit    29.8  L


 


Hemoglobin    10.1  L


 


Lymphocytes #    1.0  


 


Lymphocytes %    20.8  


 


Magnesium Level    2.0  


 


Mean Corpuscular Hemoglobin    29.2  


 


Mean Corpuscular Hemoglobin


Concent 


  


  


  34.0  


 


 


Mean Corpuscular Volume    86.0  


 


Mean Platelet Volume    9.3  


 


Monocytes #    0.9  


 


Monocytes %    18.7  H


 


Neutrophils #    2.8  


 


Neutrophils %    60.1  


 


Nucleated Red Blood Cells #    0.0  


 


Nucleated Red Blood Cells %    0.0  


 


Phosphorus Level    3.6  


 


Platelet Count    291  


 


Potassium Level    3.5  


 


Red Blood Count    3.47  L


 


Red Cell Distribution Width    13.9  


 


Sodium Level    140  


 


White Blood Count    4.6  #L














Test


  1/27/17


07:32 


  


  


 


 


Bedside Glucose 94     











Medications


Medications





 Current Medications


Trazodone HCl (Desyrel) 100 mg HS PO  Last administered on 1/26/17at 20:15; 

Admin Dose 100 MG;  Start 1/24/17 at 21:00


Tamsulosin HCl (Flomax) 0.4 mg HS PO  Last administered on 1/26/17at 20:16; 

Admin Dose 0.4 MG;  Start 1/24/17 at 21:00


Oxycodone/ Acetaminophen (Percocet (5/ 325)) 1 tab Q3H  PRN PO PAIN LEVEL 1-5;  

Start 1/24/17 at 15:00


Oxycodone/ Acetaminophen (Percocet (5/ 325)) 2 tab Q3H  PRN PO PAIN LEVEL 6-10 

Last administered on 1/27/17at 02:17; Admin Dose 2 TAB;  Start 1/24/17 at 15:00


Ondansetron HCl (Zofran Inj) 4 mg Q6H  PRN IV NAUSEA AND/OR VOMITING;  Start 1/ 24/17 at 15:00


Sucralfate (Carafate Susp) 1 gm Q6 PO  Last administered on 1/27/17at 05:37; 

Admin Dose 1 GM;  Start 1/24/17 at 18:00


Potassium Chloride (Klor-Con 20) 20 meq DAILY PO  Last administered on 1/27/ 17at 09:58; Admin Dose 20 MEQ;  Start 1/25/17 at 09:00


Multivitamins Therapeutic (Theragran) 1 tab DAILY PO  Last administered on 1/27/ 17at 09:58; Admin Dose 1 TAB;  Start 1/25/17 at 09:00


Miscellaneous Information 1 ea NOTE XX ;  Start 1/24/17 at 15:00


Acetaminophen/ Hydrocodone Bitart (Norco (5/325)) 1 tab Q4H  PRN PO severe pain 

Last administered on 1/26/17at 20:16; Admin Dose 1 TAB;  Start 1/24/17 at 15:00


Hydromorphone HCl (Dilaudid) 0.4 mg Q1H  PRN IV PAIN LEVEL 6-10 Last 

administered on 1/26/17at 03:21; Admin Dose 0.4 MG;  Start 1/24/17 at 15:00


Guaifenesin (Robitussin Liquid Cup) 200 mg Q6 PO  Last administered on 1/27/ 17at 05:37; Admin Dose 200 MG;  Start 1/24/17 at 18:00


Ketoconazole (Nizoral Cr) 1 applic BID TOP  Last administered on 1/27/17at 09:58

; Admin Dose 1 APPLIC;  Start 1/24/17 at 21:00


Atorvastatin Calcium (Lipitor) 80 mg HS PO  Last administered on 1/26/17at 20:15

; Admin Dose 80 MG;  Start 1/24/17 at 21:00


Fluoxetine HCl (Prozac) 40 mg DAILY PO  Last administered on 1/27/17at 09:57; 

Admin Dose 40 MG;  Start 1/25/17 at 09:00


Clonazepam 1 mg 1 mg BID PO  Last administered on 1/27/17at 09:57; Admin Dose 1 

MG;  Start 1/24/17 at 21:00


Albumin Human 250 ml @  500 mls/hr PRN  PRN IV CVP< 8, OR SBP<90;  Start 1/24/ 17 at 15:00


Acetaminophen (Tylenol Tab) 650 mg Q3H  PRN PO ELEVATED TEMPERATURE Last 

administered on 1/24/17at 17:16; Admin Dose 650 MG;  Start 1/24/17 at 15:00


Diagnostic Test (Pha) (Accucheck) 1 ea 02 XX ;  Start 1/25/17 at 02:00


Glucose (Glutose) 15 gm Q15M  PRN PO DECREASED GLUCOSE;  Start 1/24/17 at 15:00


Glucose (Glutose) 22.5 gm Q15M  PRN PO DECREASED GLUCOSE;  Start 1/24/17 at 15:

00


Dextrose (D50w Syringe) 25 ml Q15M  PRN IV DECREASED GLUCOSE;  Start 1/24/17 at 

15:00


Dextrose (D50w Syringe) 50 ml Q15M  PRN IV DECREASED GLUCOSE;  Start 1/24/17 at 

15:00


Glucagon (Glucagen) 1 mg Q15M  PRN IM DECREASED GLUCOSE;  Start 1/24/17 at 15:00


Glucose (Glutose) 15 gm Q15M  PRN BUCCAL DECREASED GLUCOSE;  Start 1/24/17 at 15

:00


Aspirin (Aspirin) 325 mg DAILY PO  Last administered on 1/27/17at 09:57; Admin 

Dose 325 MG;  Start 1/25/17 at 09:00


Enoxaparin Sodium (Lovenox) 40 mg DAILY SC  Last administered on 1/27/17at 09:59

; Admin Dose 40 MG;  Start 1/25/17 at 09:00


Clonazepam (Klonopin) 1 mg Q8H  PRN PO ANXIETY;  Start 1/24/17 at 15:00


Albuterol/ Ipratropium (Duoneb) 3 ml PRN  PRN HHN WHEEZING AND SOB;  Start 1/24/ 17 at 15:00


Famotidine (Pepcid) 20 mg BID PO  Last administered on 1/27/17at 09:58; Admin 

Dose 20 MG;  Start 1/24/17 at 21:00


Guaifenesin/ Codeine Phosphate (Robitussin Ac Liquid Cup) 10 ml Q4H  PRN PO 

COUGH Last administered on 1/27/17at 10:11; Admin Dose 10 ML;  Start 1/24/17 at 

15:00


Hydromorphone HCl (Dilaudid) 0.2 mg Q1H  PRN IV PAIN LEVEL 1-5;  Start 1/24/17 

at 15:00


Phenol (Cepastat Lozenge) 1 lozenge Q4  PRN PO dry mouth;  Start 1/26/17 at 09:

30


Magnesium Hydroxide (Milk Of Mag) 30 ml DAILY  PRN PO CONSTIPATION Last 

administered on 1/27/17at 10:01; Admin Dose 30 ML;  Start 1/27/17 at 08:30


Metoprolol Tartrate (Lopressor) 12.5 mg BID PO ;  Start 1/27/17 at 10:00





Procedures


Procedures


CXR 1/20/17:  Mild cardiomegaly


LLL retrocardiac density (consolidation/atelectasis)


Small left pleural effusion











CHACHO GARZA NP Jan 27, 2017 11:11

## 2017-01-28 VITALS — DIASTOLIC BLOOD PRESSURE: 63 MMHG | HEART RATE: 73 BPM | SYSTOLIC BLOOD PRESSURE: 132 MMHG | RESPIRATION RATE: 18 BRPM

## 2017-01-28 VITALS — RESPIRATION RATE: 18 BRPM | SYSTOLIC BLOOD PRESSURE: 132 MMHG | DIASTOLIC BLOOD PRESSURE: 63 MMHG

## 2017-01-28 VITALS — SYSTOLIC BLOOD PRESSURE: 137 MMHG | RESPIRATION RATE: 18 BRPM | DIASTOLIC BLOOD PRESSURE: 67 MMHG

## 2017-01-28 VITALS — SYSTOLIC BLOOD PRESSURE: 125 MMHG | DIASTOLIC BLOOD PRESSURE: 75 MMHG | RESPIRATION RATE: 18 BRPM

## 2017-01-28 VITALS — DIASTOLIC BLOOD PRESSURE: 56 MMHG | RESPIRATION RATE: 19 BRPM | SYSTOLIC BLOOD PRESSURE: 116 MMHG

## 2017-01-28 RX ADMIN — SUCRALFATE SCH GM: 1 SUSPENSION ORAL at 00:00

## 2017-01-28 RX ADMIN — FAMOTIDINE SCH MG: 20 TABLET ORAL at 20:12

## 2017-01-28 RX ADMIN — TAMSULOSIN HYDROCHLORIDE SCH MG: 0.4 CAPSULE ORAL at 20:11

## 2017-01-28 RX ADMIN — GUAIFENESIN SCH MG: 100 SOLUTION ORAL at 00:01

## 2017-01-28 RX ADMIN — ASPIRIN 325 MG ORAL TABLET SCH MG: 325 PILL ORAL at 08:11

## 2017-01-28 RX ADMIN — HYDROMORPHONE HYDROCHLORIDE PRN MG: 1 INJECTION, SOLUTION INTRAMUSCULAR; INTRAVENOUS; SUBCUTANEOUS at 18:17

## 2017-01-28 RX ADMIN — GUAIFENESIN SCH MG: 100 SOLUTION ORAL at 14:30

## 2017-01-28 RX ADMIN — SUCRALFATE SCH GM: 1 SUSPENSION ORAL at 08:15

## 2017-01-28 RX ADMIN — GUAIFENESIN SCH MG: 100 SOLUTION ORAL at 18:16

## 2017-01-28 RX ADMIN — HYDROMORPHONE HYDROCHLORIDE PRN MG: 1 INJECTION, SOLUTION INTRAMUSCULAR; INTRAVENOUS; SUBCUTANEOUS at 11:23

## 2017-01-28 RX ADMIN — HYDROCODONE BITARTRATE AND ACETAMINOPHEN PRN TAB: 5; 325 TABLET ORAL at 08:12

## 2017-01-28 RX ADMIN — SUCRALFATE SCH GM: 1 SUSPENSION ORAL at 08:05

## 2017-01-28 RX ADMIN — SUCRALFATE SCH GM: 1 SUSPENSION ORAL at 06:30

## 2017-01-28 RX ADMIN — METOPROLOL TARTRATE SCH MG: 25 TABLET, FILM COATED ORAL at 20:13

## 2017-01-28 RX ADMIN — HYDROMORPHONE HYDROCHLORIDE PRN MG: 1 INJECTION, SOLUTION INTRAMUSCULAR; INTRAVENOUS; SUBCUTANEOUS at 14:35

## 2017-01-28 RX ADMIN — SUCRALFATE SCH GM: 1 SUSPENSION ORAL at 12:00

## 2017-01-28 RX ADMIN — FLUOXETINE SCH MG: 20 CAPSULE ORAL at 08:13

## 2017-01-28 RX ADMIN — THERA TABS SCH TAB: TAB at 08:12

## 2017-01-28 RX ADMIN — ATORVASTATIN CALCIUM SCH MG: 80 TABLET, FILM COATED ORAL at 20:11

## 2017-01-28 RX ADMIN — HYDROMORPHONE HYDROCHLORIDE PRN MG: 1 INJECTION, SOLUTION INTRAMUSCULAR; INTRAVENOUS; SUBCUTANEOUS at 08:06

## 2017-01-28 RX ADMIN — GUAIFENESIN SCH MG: 100 SOLUTION ORAL at 06:30

## 2017-01-28 RX ADMIN — POTASSIUM CHLORIDE SCH MEQ: 20 TABLET, EXTENDED RELEASE ORAL at 08:05

## 2017-01-28 RX ADMIN — FAMOTIDINE SCH MG: 20 TABLET ORAL at 08:12

## 2017-01-28 RX ADMIN — METOPROLOL TARTRATE SCH MG: 25 TABLET, FILM COATED ORAL at 09:00

## 2017-01-28 RX ADMIN — ENOXAPARIN SODIUM SCH MG: 100 INJECTION SUBCUTANEOUS at 08:14

## 2017-01-28 RX ADMIN — DOCUSATE SODIUM SCH MG: 100 CAPSULE, LIQUID FILLED ORAL at 21:00

## 2017-01-28 NOTE — PN
Date/Time of Note


Date/Time of Note


DATE: 1/28/17 


TIME: 13:50





Assessment/Plan


VTE Prophylaxis


VTE Prophylaxis Intervention:  LMWH





Lines/Catheters


IV Catheter Type (from [facility]):  Saline Lock


Urinary Cath still in place:  No





Assessment/Plan


Assessment/Plan


1. Critical illness myopathy, with impaired mobility/gait/ADLs.  Continue PT/

OT. Gait max assist 3ft with FWW, poor plus standing balance. Activity has been 

limited by orthostatic hypotension.


2. Orthostatic hypotension. Internal medicine adjusting medications, continue 

to monitor.


3. Status post respiratory failure. 


4. Anemia. Continue to monitor hemoglobin/hematocrit.


5. History of coronary artery disease, status post NSTEMI and coronary artery 

bypass graft. Continue medical management per cardiology. Surgical site care. 


6. Dysphagia. Continue ST. Continue modified diet.


7. Benign prostatic hypertrophy/urinary incontinence. Continue flomax. Urology 

following.


8. History of anxiety and depression. Continue current regimen.





Subjective


24 Hr Interval Summary


Free Text/Dictation


Rehab progress note





Subjective: No acute complaints. Nursing reports no acute overnight events.





ROS: Denies headache, no dizziness, no shortness of breath, no abdominal pain, 

no chest pain, no nausea or vomiting.





Exam/Review of Systems


Vital Signs


Vitals





 Vital Signs








  Date Time  Temp Pulse Resp B/P Pulse Ox O2 Delivery O2 Flow Rate FiO2


 


1/28/17 08:00      Nasal Cannula 2.0 


 


1/28/17 08:00 98.4 73 18 132/63 99   














 Intake and Output   


 


 1/27/17 1/27/17 1/28/17





 15:00 23:00 07:00


 


Intake Total 480 ml 720 ml 1050 ml


 


Output Total 700 ml  300 ml


 


Balance -220 ml 720 ml 750 ml











Exam


General: Awake, alert, no acute distress


CV: Regular rate, s1s2


Lungs: Respirations are nonlabored, no wheezing


Abdomen soft, nontender, +bowel sounds


Extremities: No cyanosis, calves nontender


Neuro: Follows simple commands. No new sensory changes.





Results


Result Diagram:  


1/27/17 0620                                                                   

             1/27/17 0620





Results 24 hrs





Laboratory Tests








Test


  1/27/17


17:06 1/27/17


20:24 1/28/17


07:33 1/28/17


12:18


 


Bedside Glucose 105   108   100   102  











Medications


Medications





 Current Medications


Trazodone HCl (Desyrel) 100 mg HS PO  Last administered on 1/27/17at 20:26; 

Admin Dose 100 MG;  Start 1/24/17 at 21:00


Tamsulosin HCl (Flomax) 0.4 mg HS PO  Last administered on 1/27/17at 20:26; 

Admin Dose 0.4 MG;  Start 1/24/17 at 21:00


Oxycodone/ Acetaminophen (Percocet (5/ 325)) 1 tab Q3H  PRN PO PAIN LEVEL 1-5 

Last administered on 1/27/17at 20:26; Admin Dose 1 TAB;  Start 1/24/17 at 15:00


Oxycodone/ Acetaminophen (Percocet (5/ 325)) 2 tab Q3H  PRN PO PAIN LEVEL 6-10 

Last administered on 1/28/17at 01:28; Admin Dose 2 TAB;  Start 1/24/17 at 15:00


Ondansetron HCl (Zofran Inj) 4 mg Q6H  PRN IV NAUSEA AND/OR VOMITING;  Start 1/ 24/17 at 15:00


Sucralfate (Carafate Susp) 1 gm Q6 PO  Last administered on 1/28/17at 08:15; 

Admin Dose 1 GM;  Start 1/24/17 at 18:00


Potassium Chloride (Klor-Con 20) 20 meq DAILY PO  Last administered on 1/28/ 17at 08:05; Admin Dose 20 MEQ;  Start 1/25/17 at 09:00;  Status Future Hold


Multivitamins Therapeutic (Theragran) 1 tab DAILY PO  Last administered on 1/28/ 17at 08:12; Admin Dose 1 TAB;  Start 1/25/17 at 09:00


Miscellaneous Information 1 ea NOTE XX ;  Start 1/24/17 at 15:00


Acetaminophen/ Hydrocodone Bitart (Norco (5/325)) 1 tab Q4H  PRN PO severe pain 

Last administered on 1/28/17at 08:12; Admin Dose 1 TAB;  Start 1/24/17 at 15:00


Guaifenesin (Robitussin Liquid Cup) 200 mg Q6 PO  Last administered on 1/28/ 17at 06:30; Admin Dose 200 MG;  Start 1/24/17 at 18:00


Ketoconazole (Nizoral Cr) 1 applic BID TOP  Last administered on 1/27/17at 21:00

; Admin Dose 1 APPLIC;  Start 1/24/17 at 21:00


Atorvastatin Calcium (Lipitor) 80 mg HS PO  Last administered on 1/27/17at 20:26

; Admin Dose 80 MG;  Start 1/24/17 at 21:00


Fluoxetine HCl (Prozac) 40 mg DAILY PO  Last administered on 1/28/17at 08:13; 

Admin Dose 40 MG;  Start 1/25/17 at 09:00


Clonazepam 1 mg 1 mg BID PO  Last administered on 1/28/17at 08:13; Admin Dose 1 

MG;  Start 1/24/17 at 21:00


Albumin Human 250 ml @  500 mls/hr PRN  PRN IV CVP< 8, OR SBP<90;  Start 1/24/ 17 at 15:00


Acetaminophen (Tylenol Tab) 650 mg Q3H  PRN PO ELEVATED TEMPERATURE Last 

administered on 1/24/17at 17:16; Admin Dose 650 MG;  Start 1/24/17 at 15:00


Diagnostic Test (Pha) (Accucheck) 1 ea 02 XX ;  Start 1/25/17 at 02:00


Glucose (Glutose) 15 gm Q15M  PRN PO DECREASED GLUCOSE;  Start 1/24/17 at 15:00


Glucose (Glutose) 22.5 gm Q15M  PRN PO DECREASED GLUCOSE;  Start 1/24/17 at 15:

00


Dextrose (D50w Syringe) 25 ml Q15M  PRN IV DECREASED GLUCOSE;  Start 1/24/17 at 

15:00


Dextrose (D50w Syringe) 50 ml Q15M  PRN IV DECREASED GLUCOSE;  Start 1/24/17 at 

15:00


Glucagon (Glucagen) 1 mg Q15M  PRN IM DECREASED GLUCOSE;  Start 1/24/17 at 15:00


Glucose (Glutose) 15 gm Q15M  PRN BUCCAL DECREASED GLUCOSE;  Start 1/24/17 at 15

:00


Aspirin (Aspirin) 325 mg DAILY PO  Last administered on 1/28/17at 08:11; Admin 

Dose 325 MG;  Start 1/25/17 at 09:00


Enoxaparin Sodium (Lovenox) 40 mg DAILY SC  Last administered on 1/28/17at 08:14

; Admin Dose 40 MG;  Start 1/25/17 at 09:00


Clonazepam (Klonopin) 1 mg Q8H  PRN PO ANXIETY;  Start 1/24/17 at 15:00


Albuterol/ Ipratropium (Duoneb) 3 ml PRN  PRN HHN WHEEZING AND SOB;  Start 1/24/ 17 at 15:00


Famotidine (Pepcid) 20 mg BID PO  Last administered on 1/28/17at 08:12; Admin 

Dose 20 MG;  Start 1/24/17 at 21:00


Guaifenesin/ Codeine Phosphate (Robitussin Ac Liquid Cup) 10 ml Q4H  PRN PO 

COUGH Last administered on 1/27/17at 16:50; Admin Dose 10 ML;  Start 1/24/17 at 

15:00


Hydromorphone HCl (Dilaudid) 0.2 mg Q1H  PRN IV PAIN LEVEL 1-5;  Start 1/24/17 

at 15:00


Phenol (Cepastat Lozenge) 1 lozenge Q4  PRN PO dry mouth;  Start 1/26/17 at 09:

30


Magnesium Hydroxide (Milk Of Mag) 30 ml DAILY  PRN PO CONSTIPATION Last 

administered on 1/27/17at 10:01; Admin Dose 30 ML;  Start 1/27/17 at 08:30


Metoprolol Tartrate (Lopressor) 12.5 mg BID PO  Last administered on 1/27/17at 

20:28; Admin Dose 12.5 MG;  Start 1/27/17 at 10:00


Furosemide (Lasix) 20 mg DAILY PO ;  Start 1/29/17 at 09:00


Hydromorphone HCl (Dilaudid) 0.4 mg Q1H  PRN IM PAIN LEVEL 6-10 Last 

administered on 1/28/17at 11:23; Admin Dose 0.4 MG;  Start 1/28/17 at 11:00











OLGA PATEL Jan 28, 2017 14:03

## 2017-01-28 NOTE — PN
DATE:  01/28/2017

 

 

SUBJECTIVE:  The patient is clinically improved.  No further episodes of hypertension, no hemoptysis
, hematemesis or hematochezia.    

 

OBJECTIVE:

VITAL SIGNS:  Blood pressure is 137/63, respiration 18, pulse 98.6.

I's AND O's:  The patient had 1.6 liters in, 1 liter out.

HEENT:  Head is normocephalic.

NECK:  Supple.

HEART:  Regular rate.

LUNGS:  Show diminished breath sounds at the base.

ABDOMEN:  Soft, nontender to palpation.  No rebound or guarding.

EXTREMITIES:  Negative for clubbing, cyanosis ____ trace edema.

DERMATOLOGIC:  No rashes.

MUSCULOSKELETAL:  No joint effusions.

NEUROLOGIC:  No change in exam.

 

MEDICATIONS:  Have been reviewed.

 

LABORATORY DATA:  Has been reviewed.  No new labs.

 

ASSESSMENT AND PLAN:

1.  Non-ST-elevation myocardial infarction status post coronary artery bypass grafting on 01/06/2017
.  The patient is clinically stable.  Continue medical management.

2.  Acute diastolic heart failure.  The patient is euvolemic.  Will decrease Lasix to 20 mg daily.

3.  Monitor closely.

4.  Orthostatic hypotension, may be secondary to volume depletion.  Diuretic therapy has been adjust
ed.  Blood pressure medications have been adjusted.  No further episodes of orthostatic hypotension.
  Monitor closely.

5.  Status post pneumonia, bronchitis.

6.  Status post respiratory failure.

7.  ICU myopathy.  Continue medical management.

8.  Anemia.  Continue to monitor hemoglobin and hematocrit levels.

9.  Benign prostatic hypertrophy.  Continue Flomax.

10.  Cellulitis, improving.  Continue Diflucan, ____ therapy.

11.  Depression and anxiety disorder.  Continue Prozac, Klonopin.  

12.  Neuropathy, upper and lower extremities.  Continue physical therapy.

13.  Gastrointestinal and deep venous thrombosis prophylaxis.  Continue proton pump inhibitor and Lo
venox.

 

 

Dictated By: CHAVA GARZA DO

 

NR/NTS

DD:    01/28/2017 09:07:32

DT:    01/28/2017 11:47:56

Conf#: [id number]

DID#:  [id number]

CC: GABI MERCER MD;*End*

## 2017-01-29 VITALS — DIASTOLIC BLOOD PRESSURE: 61 MMHG | SYSTOLIC BLOOD PRESSURE: 117 MMHG | RESPIRATION RATE: 18 BRPM

## 2017-01-29 VITALS — DIASTOLIC BLOOD PRESSURE: 58 MMHG | SYSTOLIC BLOOD PRESSURE: 125 MMHG

## 2017-01-29 VITALS — DIASTOLIC BLOOD PRESSURE: 60 MMHG | SYSTOLIC BLOOD PRESSURE: 132 MMHG | RESPIRATION RATE: 18 BRPM

## 2017-01-29 VITALS — SYSTOLIC BLOOD PRESSURE: 113 MMHG | RESPIRATION RATE: 18 BRPM | DIASTOLIC BLOOD PRESSURE: 62 MMHG

## 2017-01-29 VITALS — DIASTOLIC BLOOD PRESSURE: 60 MMHG | SYSTOLIC BLOOD PRESSURE: 128 MMHG

## 2017-01-29 RX ADMIN — ASPIRIN 325 MG ORAL TABLET SCH MG: 325 PILL ORAL at 08:24

## 2017-01-29 RX ADMIN — GUAIFENESIN SCH MG: 100 SOLUTION ORAL at 00:00

## 2017-01-29 RX ADMIN — HYDROMORPHONE HYDROCHLORIDE PRN MG: 1 INJECTION, SOLUTION INTRAMUSCULAR; INTRAVENOUS; SUBCUTANEOUS at 14:26

## 2017-01-29 RX ADMIN — HYDROMORPHONE HYDROCHLORIDE PRN MG: 1 INJECTION, SOLUTION INTRAMUSCULAR; INTRAVENOUS; SUBCUTANEOUS at 07:50

## 2017-01-29 RX ADMIN — ENOXAPARIN SODIUM SCH MG: 100 INJECTION SUBCUTANEOUS at 08:33

## 2017-01-29 RX ADMIN — HYDROMORPHONE HYDROCHLORIDE PRN MG: 1 INJECTION, SOLUTION INTRAMUSCULAR; INTRAVENOUS; SUBCUTANEOUS at 17:58

## 2017-01-29 RX ADMIN — BISACODYL PRN MG: 10 SUPPOSITORY RECTAL at 14:23

## 2017-01-29 RX ADMIN — DOCUSATE SODIUM SCH MG: 100 CAPSULE, LIQUID FILLED ORAL at 09:00

## 2017-01-29 RX ADMIN — TAMSULOSIN HYDROCHLORIDE SCH MG: 0.4 CAPSULE ORAL at 20:07

## 2017-01-29 RX ADMIN — GUAIFENESIN SCH MG: 100 SOLUTION ORAL at 06:59

## 2017-01-29 RX ADMIN — GUAIFENESIN SCH MG: 100 SOLUTION ORAL at 12:24

## 2017-01-29 RX ADMIN — FAMOTIDINE SCH MG: 20 TABLET ORAL at 20:08

## 2017-01-29 RX ADMIN — METOPROLOL TARTRATE SCH MG: 25 TABLET, FILM COATED ORAL at 08:27

## 2017-01-29 RX ADMIN — SUCRALFATE SCH GM: 1 SUSPENSION ORAL at 06:59

## 2017-01-29 RX ADMIN — FLUOXETINE SCH MG: 20 CAPSULE ORAL at 08:23

## 2017-01-29 RX ADMIN — HYDROMORPHONE HYDROCHLORIDE PRN MG: 1 INJECTION, SOLUTION INTRAMUSCULAR; INTRAVENOUS; SUBCUTANEOUS at 12:26

## 2017-01-29 RX ADMIN — DOCUSATE SODIUM SCH MG: 100 CAPSULE, LIQUID FILLED ORAL at 18:00

## 2017-01-29 RX ADMIN — FAMOTIDINE SCH MG: 20 TABLET ORAL at 09:00

## 2017-01-29 RX ADMIN — SUCRALFATE SCH GM: 1 SUSPENSION ORAL at 12:25

## 2017-01-29 RX ADMIN — METOPROLOL TARTRATE SCH MG: 25 TABLET, FILM COATED ORAL at 20:11

## 2017-01-29 RX ADMIN — ATORVASTATIN CALCIUM SCH MG: 80 TABLET, FILM COATED ORAL at 20:08

## 2017-01-29 RX ADMIN — SUCRALFATE SCH GM: 1 SUSPENSION ORAL at 17:56

## 2017-01-29 RX ADMIN — GUAIFENESIN SCH MG: 100 SOLUTION ORAL at 18:00

## 2017-01-29 RX ADMIN — THERA TABS SCH TAB: TAB at 08:23

## 2017-01-29 NOTE — PN
DATE:  01/28/2017

 

 

SUBJECTIVE:  Urinary incontinence.  Patient does have postvoid residual of about 200 and he has been
 incontinent.  He; however, had a condom catheter put in and that is holding on, and he is urinating
 through it.  Therefore, the diaper rash has improved remarkably.

 

OBJECTIVE FINDINGS:

VITAL SIGNS:  His temperature is 98.4, pulse 73, respiration 18, blood pressure 132/63.

ABDOMEN:  Soft.

GENITOURINARY:  The external genitalia show that he has a condom catheter in, and that is draining c
lear urine.

 

LABORATORY DATA:  His CBC shows a white count of 4.6, hemoglobin 10.1, hematocrit 29.8.  BUN is 20, 
creatinine 0.93.  Electrolytes are normal.  Urine culture:  No growth after 48 hours.

 

IMPRESSION:  Urinary incontinence, which is managed by a condom catheter, and the patient is doing w
ell.

 

PLAN:  Continue the same.

 

 

Dictated By: ASHELY ESPINOZA/DIMPLE

DD:    01/28/2017 14:23:49

DT:    01/29/2017 01:44:50

Conf#: [id number]

DID#:  [id number]

## 2017-01-29 NOTE — PN
DATE:  01/29/2017

 

 

SUBJECTIVE:  The patient had an episode of orthostatic hypotension yesterday with mild symptoms.  Th
e patient, however, was able to continue physical therapy.  No other acute events noted.  No hemopty
sis, hematemesis.

 

OBJECTIVE:

VITAL SIGNS:  Blood pressure is 160/57, respiration 19, pulse 87, temperature 98.3.

HEENT:  Head is normocephalic.

NECK:  Supple.

HEART:  Regular rate.

LUNGS:  Show diminished breath sounds at base.

ABDOMEN:  Soft, nontender to palpation.  No rebound or guarding.

EXTREMITIES:  Negative for clubbing, cyanosis, no edema.

DERMATOLOGIC:  No rashes.

MUSCULOSKELETAL:  No joint effusions.

NEUROLOGIC:  No change in exam.

 

MEDICATIONS:  The patient's medications have been reviewed.

 

LABORATORY DATA:  Reviewed.

 

ASSESSMENT AND PLAN:

1.  Non-ST elevation myocardial infarction, status post CABG on 01/06/2017.  The patient is clinical
ly stable.  Continue medical management.

2.  Acute diastolic heart failure.  The patient is clinically improved, now euvolemic.  We will hold
 diuretic therapy due to episodes of orthostatic hypotension.

3.  Orthostatic hypotension.  Etiology may be multifactorial due to autonomic dysfunction and possib
le component of volume depletion.  The patient's diuretic therapy will be held.  Will continue to mo
nitor blood pressures closely.

4.  ICU myopathy Continue medical management.  Continue physical therapy.

5.  Anemia.  Continue to monitor hemoglobin and hematocrit levels.

6.  Benign prostatic hypertrophy.  Continue Flomax.  Follow up with urology.

7.  Depression and anxiety disorder.  Continue Prozac and Klonopin.

8.  Neuropathy upper and lower extremities.  Continue physical therapy.

9.  Status post pneumonia.

10.  Status post respiratory failure.

11.  Gastrointestinal and deep venous thrombosis prophylaxis.  Continue proton pump inhibitor and Lo
venox.

 

 

Dictated By: CHAVA GARZA DO

 

NR/NTS

DD:    01/29/2017 08:43:55

DT:    01/29/2017 15:42:00

Conf#: [id number]

DID#:  [id number]

## 2017-01-29 NOTE — PN
Date/Time of Note


Date/Time of Note


DATE: 1/29/17 


TIME: 12:56





Assessment/Plan


VTE Prophylaxis


VTE Prophylaxis Intervention:  LMWH





Lines/Catheters


IV Catheter Type (from [facility]):  Saline Lock


Urinary Cath still in place:  No





Assessment/Plan


Assessment/Plan


1. Critical illness myopathy, with impaired mobility/gait/ADLs.  Continue PT/

OT. Max assist for bed mobility.


2. Orthostatic hypotension. Internal medicine adjusting regimen. Continue to 

closely monitor vitals.


3. Anemia. Continue to monitor hemoglobin/hematocrit.


4. Status post respiratory failure. Pulmonary status stable at present, monitor.


5. History of coronary artery disease s/p NSTEMI and CABG. Continue medical 

management per cardiology and internal medicine.


6. Dysphagia. Continue modified diet. Continue swallowing training with ST. 

Aspiration precautions.


7.  Benign prostatic hypertrophy, urinary incontinence. Continue flomax. 

Continue condom catheter.  following.


8. History of anxiety and depression. Mood stable, continue current regimen.


9. Constipation. Bowel regimen adjusted.





Subjective


24 Hr Interval Summary


Free Text/Dictation


Rehab progress note





Subjective: Reports constipation, no abdominal pain, no nausea or vomiting.





ROS: Denies chest pain, no shortness of breath, no abdominal pain, no nausea or 

vomiting, no chills.





Exam/Review of Systems


Vital Signs


Vitals





 Vital Signs








  Date Time  Temp Pulse Resp B/P Pulse Ox O2 Delivery O2 Flow Rate FiO2


 


1/29/17 07:50   18 132/60    


 


1/29/17 07:30 97.7 79   92   


 


1/28/17 21:52        21


 


1/28/17 20:00      Nasal Cannula 2.0 














 Intake and Output   


 


 1/28/17 1/28/17 1/29/17





 15:00 23:00 07:00


 


Intake Total  860 ml 480 ml


 


Output Total  900 ml 420 ml


 


Balance  -40 ml 60 ml











Exam


General: Awake, alert, no acute distress


CV: Regular rate, s1s2. Sternal dressing in place clean and dry.


Lungs: Respirations are nonlabored, decreased bibasilar breath sounds


Abdomen soft, nontender, +bowel sounds


Extremities: No new swelling, no cyanosis. 


Neuro: No new focal changes. Follows simple commands.





Results


Result Diagram:  


1/27/17 0620                                                                   

             1/27/17 0620





Results 24 hrs





Laboratory Tests








Test


  1/28/17


17:21 1/28/17


20:52 1/29/17


07:51 1/29/17


12:02


 


Bedside Glucose 110   146   94   138  











Medications


Medications





 Current Medications


Trazodone HCl (Desyrel) 100 mg HS PO  Last administered on 1/28/17at 20:12; 

Admin Dose 100 MG;  Start 1/24/17 at 21:00


Tamsulosin HCl (Flomax) 0.4 mg HS PO  Last administered on 1/28/17at 20:11; 

Admin Dose 0.4 MG;  Start 1/24/17 at 21:00


Oxycodone/ Acetaminophen (Percocet (5/ 325)) 1 tab Q3H  PRN PO PAIN LEVEL 1-5 

Last administered on 1/27/17at 20:26; Admin Dose 1 TAB;  Start 1/24/17 at 15:00


Oxycodone/ Acetaminophen (Percocet (5/ 325)) 2 tab Q3H  PRN PO PAIN LEVEL 6-10 

Last administered on 1/28/17at 23:50; Admin Dose 2 TAB;  Start 1/24/17 at 15:00


Ondansetron HCl (Zofran Inj) 4 mg Q6H  PRN IV NAUSEA AND/OR VOMITING;  Start 1/ 24/17 at 15:00


Sucralfate (Carafate Susp) 1 gm Q6 PO  Last administered on 1/29/17at 12:25; 

Admin Dose 1 GM;  Start 1/24/17 at 18:00


Potassium Chloride (Klor-Con 20) 20 meq DAILY PO  Last administered on 1/28/ 17at 08:05; Admin Dose 20 MEQ;  Start 1/25/17 at 09:00


Multivitamins Therapeutic (Theragran) 1 tab DAILY PO  Last administered on 1/29/ 17at 08:23; Admin Dose 1 TAB;  Start 1/25/17 at 09:00


Miscellaneous Information 1 ea NOTE XX ;  Start 1/24/17 at 15:00


Acetaminophen/ Hydrocodone Bitart (Norco (5/325)) 1 tab Q4H  PRN PO severe pain 

Last administered on 1/28/17at 08:12; Admin Dose 1 TAB;  Start 1/24/17 at 15:00


Guaifenesin (Robitussin Liquid Cup) 200 mg Q6 PO  Last administered on 1/29/ 17at 12:24; Admin Dose 200 MG;  Start 1/24/17 at 18:00


Ketoconazole (Nizoral Cr) 1 applic BID TOP  Last administered on 1/27/17at 21:00

; Admin Dose 1 APPLIC;  Start 1/24/17 at 21:00


Atorvastatin Calcium (Lipitor) 80 mg HS PO  Last administered on 1/28/17at 20:11

; Admin Dose 80 MG;  Start 1/24/17 at 21:00


Fluoxetine HCl (Prozac) 40 mg DAILY PO  Last administered on 1/29/17at 08:23; 

Admin Dose 40 MG;  Start 1/25/17 at 09:00


Clonazepam 1 mg 1 mg BID PO  Last administered on 1/29/17at 08:24; Admin Dose 1 

MG;  Start 1/24/17 at 21:00


Albumin Human 250 ml @  500 mls/hr PRN  PRN IV CVP< 8, OR SBP<90;  Start 1/24/ 17 at 15:00


Acetaminophen (Tylenol Tab) 650 mg Q3H  PRN PO ELEVATED TEMPERATURE Last 

administered on 1/24/17at 17:16; Admin Dose 650 MG;  Start 1/24/17 at 15:00


Diagnostic Test (Pha) (Accucheck) 1 ea 02 XX ;  Start 1/25/17 at 02:00


Glucose (Glutose) 15 gm Q15M  PRN PO DECREASED GLUCOSE;  Start 1/24/17 at 15:00


Glucose (Glutose) 22.5 gm Q15M  PRN PO DECREASED GLUCOSE;  Start 1/24/17 at 15:

00


Dextrose (D50w Syringe) 25 ml Q15M  PRN IV DECREASED GLUCOSE;  Start 1/24/17 at 

15:00


Dextrose (D50w Syringe) 50 ml Q15M  PRN IV DECREASED GLUCOSE;  Start 1/24/17 at 

15:00


Glucagon (Glucagen) 1 mg Q15M  PRN IM DECREASED GLUCOSE;  Start 1/24/17 at 15:00


Glucose (Glutose) 15 gm Q15M  PRN BUCCAL DECREASED GLUCOSE;  Start 1/24/17 at 15

:00


Aspirin (Aspirin) 325 mg DAILY PO  Last administered on 1/29/17at 08:24; Admin 

Dose 325 MG;  Start 1/25/17 at 09:00


Enoxaparin Sodium (Lovenox) 40 mg DAILY SC  Last administered on 1/29/17at 08:33

; Admin Dose 40 MG;  Start 1/25/17 at 09:00


Clonazepam (Klonopin) 1 mg Q8H  PRN PO ANXIETY;  Start 1/24/17 at 15:00


Albuterol/ Ipratropium (Duoneb) 3 ml PRN  PRN HHN WHEEZING AND SOB;  Start 1/24/ 17 at 15:00


Famotidine (Pepcid) 20 mg BID PO  Last administered on 1/29/17at 09:00; Admin 

Dose 20 MG;  Start 1/24/17 at 21:00


Guaifenesin/ Codeine Phosphate (Robitussin Ac Liquid Cup) 10 ml Q4H  PRN PO 

COUGH Last administered on 1/29/17at 08:43; Admin Dose 10 ML;  Start 1/24/17 at 

15:00


Hydromorphone HCl (Dilaudid) 0.2 mg Q1H  PRN IV PAIN LEVEL 1-5;  Start 1/24/17 

at 15:00


Phenol (Cepastat Lozenge) 1 lozenge Q4  PRN PO dry mouth;  Start 1/26/17 at 09:

30


Magnesium Hydroxide (Milk Of Mag) 30 ml DAILY  PRN PO CONSTIPATION Last 

administered on 1/28/17at 14:33; Admin Dose 30 ML;  Start 1/27/17 at 08:30


Metoprolol Tartrate (Lopressor) 12.5 mg BID PO  Last administered on 1/29/17at 

08:27; Admin Dose 12.5 MG;  Start 1/27/17 at 10:00


Hydromorphone HCl (Dilaudid) 0.4 mg Q1H  PRN IM PAIN LEVEL 6-10 Last 

administered on 1/29/17at 07:50; Admin Dose 0.4 MG;  Start 1/28/17 at 11:00


Bisacodyl (Dulcolax Supp) 10 mg DAILY  PRN ME CONSTIPATION;  Start 1/28/17 at 15

:00


Docusate Sodium (Colace) 100 mg BID PO  Last administered on 1/29/17at 09:00; 

Admin Dose 100 MG;  Start 1/28/17 at 21:00











OLGA PATEL Jan 29, 2017 13:01

## 2017-01-30 VITALS — RESPIRATION RATE: 18 BRPM | SYSTOLIC BLOOD PRESSURE: 117 MMHG | DIASTOLIC BLOOD PRESSURE: 50 MMHG

## 2017-01-30 VITALS — RESPIRATION RATE: 18 BRPM | DIASTOLIC BLOOD PRESSURE: 61 MMHG | SYSTOLIC BLOOD PRESSURE: 117 MMHG

## 2017-01-30 RX ADMIN — METOPROLOL TARTRATE SCH MG: 25 TABLET, FILM COATED ORAL at 10:01

## 2017-01-30 RX ADMIN — FAMOTIDINE SCH MG: 20 TABLET ORAL at 20:40

## 2017-01-30 RX ADMIN — SUCRALFATE SCH GM: 1 SUSPENSION ORAL at 17:55

## 2017-01-30 RX ADMIN — DOCUSATE SODIUM SCH MG: 100 CAPSULE, LIQUID FILLED ORAL at 21:00

## 2017-01-30 RX ADMIN — METOPROLOL TARTRATE SCH MG: 25 TABLET, FILM COATED ORAL at 20:49

## 2017-01-30 RX ADMIN — ASPIRIN 325 MG ORAL TABLET SCH MG: 325 PILL ORAL at 10:02

## 2017-01-30 RX ADMIN — SUCRALFATE SCH GM: 1 SUSPENSION ORAL at 12:46

## 2017-01-30 RX ADMIN — THERA TABS SCH TAB: TAB at 10:02

## 2017-01-30 RX ADMIN — TAMSULOSIN HYDROCHLORIDE SCH MG: 0.4 CAPSULE ORAL at 20:39

## 2017-01-30 RX ADMIN — GUAIFENESIN SCH MG: 100 SOLUTION ORAL at 12:00

## 2017-01-30 RX ADMIN — ENOXAPARIN SODIUM SCH MG: 100 INJECTION SUBCUTANEOUS at 11:19

## 2017-01-30 RX ADMIN — DOCUSATE SODIUM SCH MG: 100 CAPSULE, LIQUID FILLED ORAL at 10:02

## 2017-01-30 RX ADMIN — GUAIFENESIN SCH MG: 100 SOLUTION ORAL at 06:00

## 2017-01-30 RX ADMIN — SUCRALFATE SCH GM: 1 SUSPENSION ORAL at 00:42

## 2017-01-30 RX ADMIN — FLUOXETINE SCH MG: 20 CAPSULE ORAL at 10:00

## 2017-01-30 RX ADMIN — FAMOTIDINE SCH MG: 20 TABLET ORAL at 10:02

## 2017-01-30 RX ADMIN — GUAIFENESIN SCH MG: 100 SOLUTION ORAL at 17:55

## 2017-01-30 RX ADMIN — GUAIFENESIN SCH MG: 100 SOLUTION ORAL at 00:42

## 2017-01-30 RX ADMIN — SUCRALFATE SCH GM: 1 SUSPENSION ORAL at 06:12

## 2017-01-30 RX ADMIN — ATORVASTATIN CALCIUM SCH MG: 80 TABLET, FILM COATED ORAL at 20:39

## 2017-01-30 NOTE — PN
DATE:  01/30/2017

 

 

SUBJECTIVE:  Urinary incontinence and a high postvoid residual.  Patient does have a condom catheter
 on and that is holding well and is urinating through it clear urine.

 

OBJECTIVE:

VITAL SIGNS:  Temperature is 98.1, pulse is 83, respiration 18, blood pressure 113/62.

ABDOMEN:  Soft.

GENITOURINARY:  The external genitalia are normal. There is rash over the groin and the genital area
 has much decreased since the use of the condom catheter and the urine is clear.

 

PLAN:  To continue using the condom catheter.

 

 

Dictated By: ASHELY EVERETT MD

 

BB/DIMPLE

DD:    01/30/2017 08:00:39

DT:    01/30/2017 09:06:18

Conf#: [id number]

DID#:  [id number]

## 2017-01-30 NOTE — CONS
Date/Time of Note


Date/Time of Note


DATE: 1/30/17 


TIME: 12:27





Consult Date/Type/Reason


Admit Date/Time


Jan 24, 2017 at 13:35


Type of Consultation:  Infectious Disease





Subjective


No New complaints





Objective





 Vital Signs








  Date Time  Temp Pulse Resp B/P Pulse Ox O2 Delivery O2 Flow Rate FiO2


 


1/29/17 20:00 98.1 83 18 113/62 94   


 


1/29/17 19:46      Nasal Cannula 2.0 


 


1/28/17 21:52        21














 Intake and Output   


 


 1/29/17 1/29/17 1/30/17





 15:00 23:00 07:00


 


Intake Total  1040 ml 240 ml


 


Output Total  380 ml 400 ml


 


Balance  660 ml -160 ml





INTERDISCIPLINARY TEAM CONFERENCE





BOWEL- Cont


BLADDER-condom cath 


SKIN- intact











OT-     DRESSING-mod/max


   BATHING-mod/max


   TOILETING-mod/max





PT-      BED MOBILITY-mod


   TRANSFERS-mod


   AMBULATION-mod assist 5 feet


   


SPEECH- 


   DYPHAGIA-mech soft








A/P-


Interdisciplinary team conference held today. Please see interdisciplinary 

sheet.  Working toward d.c. on 2/8 with post discharge follow up of physical 

therapy, occupational therapy.





Results/Medications


Result Diagram:  


1/27/17 0620                                                                   

             1/27/17 0620





Results 24 hrs





Laboratory Tests








Test


  1/29/17


17:29 1/29/17


20:18 1/30/17


07:54


 


Bedside Glucose 79   127   103  








Medications





 Current Medications


Trazodone HCl (Desyrel) 100 mg HS PO  Last administered on 1/29/17at 20:08; 

Admin Dose 100 MG;  Start 1/24/17 at 21:00


Tamsulosin HCl (Flomax) 0.4 mg HS PO  Last administered on 1/29/17at 20:07; 

Admin Dose 0.4 MG;  Start 1/24/17 at 21:00


Oxycodone/ Acetaminophen (Percocet (5/ 325)) 1 tab Q3H  PRN PO PAIN LEVEL 1-5 

Last administered on 1/27/17at 20:26; Admin Dose 1 TAB;  Start 1/24/17 at 15:00


Oxycodone/ Acetaminophen (Percocet (5/ 325)) 2 tab Q3H  PRN PO PAIN LEVEL 6-10 

Last administered on 1/30/17at 00:42; Admin Dose 2 TAB;  Start 1/24/17 at 15:00


Ondansetron HCl (Zofran Inj) 4 mg Q6H  PRN IV NAUSEA AND/OR VOMITING;  Start 1/ 24/17 at 15:00


Sucralfate (Carafate Susp) 1 gm Q6 PO  Last administered on 1/30/17at 06:12; 

Admin Dose 1 GM;  Start 1/24/17 at 18:00


Potassium Chloride (Klor-Con 20) 20 meq DAILY PO  Last administered on 1/28/ 17at 08:05; Admin Dose 20 MEQ;  Start 1/25/17 at 09:00


Multivitamins Therapeutic (Theragran) 1 tab DAILY PO  Last administered on 1/30/ 17at 10:02; Admin Dose 1 TAB;  Start 1/25/17 at 09:00


Miscellaneous Information 1 ea NOTE XX ;  Start 1/24/17 at 15:00


Acetaminophen/ Hydrocodone Bitart (Norco (5/325)) 1 tab Q4H  PRN PO severe pain 

Last administered on 1/28/17at 08:12; Admin Dose 1 TAB;  Start 1/24/17 at 15:00


Guaifenesin (Robitussin Liquid Cup) 200 mg Q6 PO  Last administered on 1/30/ 17at 00:42; Admin Dose 200 MG;  Start 1/24/17 at 18:00


Ketoconazole (Nizoral Cr) 1 applic BID TOP  Last administered on 1/30/17at 09:00

; Admin Dose 1 APPLIC;  Start 1/24/17 at 21:00


Atorvastatin Calcium (Lipitor) 80 mg HS PO  Last administered on 1/29/17at 20:08

; Admin Dose 80 MG;  Start 1/24/17 at 21:00


Fluoxetine HCl (Prozac) 40 mg DAILY PO  Last administered on 1/30/17at 10:00; 

Admin Dose 40 MG;  Start 1/25/17 at 09:00


Clonazepam 1 mg 1 mg BID PO  Last administered on 1/30/17at 10:02; Admin Dose 1 

MG;  Start 1/24/17 at 21:00


Albumin Human 250 ml @  500 mls/hr PRN  PRN IV CVP< 8, OR SBP<90;  Start 1/24/ 17 at 15:00


Acetaminophen (Tylenol Tab) 650 mg Q3H  PRN PO ELEVATED TEMPERATURE Last 

administered on 1/24/17at 17:16; Admin Dose 650 MG;  Start 1/24/17 at 15:00


Diagnostic Test (Pha) (Accucheck) 1 ea 02 XX ;  Start 1/25/17 at 02:00


Glucose (Glutose) 15 gm Q15M  PRN PO DECREASED GLUCOSE;  Start 1/24/17 at 15:00


Glucose (Glutose) 22.5 gm Q15M  PRN PO DECREASED GLUCOSE;  Start 1/24/17 at 15:

00


Dextrose (D50w Syringe) 25 ml Q15M  PRN IV DECREASED GLUCOSE;  Start 1/24/17 at 

15:00


Dextrose (D50w Syringe) 50 ml Q15M  PRN IV DECREASED GLUCOSE;  Start 1/24/17 at 

15:00


Glucagon (Glucagen) 1 mg Q15M  PRN IM DECREASED GLUCOSE;  Start 1/24/17 at 15:00


Glucose (Glutose) 15 gm Q15M  PRN BUCCAL DECREASED GLUCOSE;  Start 1/24/17 at 15

:00


Aspirin (Aspirin) 325 mg DAILY PO  Last administered on 1/30/17at 10:02; Admin 

Dose 325 MG;  Start 1/25/17 at 09:00


Enoxaparin Sodium (Lovenox) 40 mg DAILY SC  Last administered on 1/30/17at 11:19

; Admin Dose 40 MG;  Start 1/25/17 at 09:00


Clonazepam (Klonopin) 1 mg Q8H  PRN PO ANXIETY;  Start 1/24/17 at 15:00


Albuterol/ Ipratropium (Duoneb) 3 ml PRN  PRN HHN WHEEZING AND SOB;  Start 1/24/ 17 at 15:00


Famotidine (Pepcid) 20 mg BID PO  Last administered on 1/30/17at 10:02; Admin 

Dose 20 MG;  Start 1/24/17 at 21:00


Guaifenesin/ Codeine Phosphate (Robitussin Ac Liquid Cup) 10 ml Q4H  PRN PO 

COUGH Last administered on 1/30/17at 06:18; Admin Dose 10 ML;  Start 1/24/17 at 

15:00


Hydromorphone HCl (Dilaudid) 0.2 mg Q1H  PRN IV PAIN LEVEL 1-5;  Start 1/24/17 

at 15:00


Phenol (Cepastat Lozenge) 1 lozenge Q4  PRN PO dry mouth;  Start 1/26/17 at 09:

30


Magnesium Hydroxide (Milk Of Mag) 30 ml DAILY  PRN PO CONSTIPATION Last 

administered on 1/28/17at 14:33; Admin Dose 30 ML;  Start 1/27/17 at 08:30


Metoprolol Tartrate (Lopressor) 12.5 mg BID PO  Last administered on 1/30/17at 

10:01; Admin Dose 12.5 MG;  Start 1/27/17 at 10:00


Hydromorphone HCl (Dilaudid) 0.4 mg Q1H  PRN IM PAIN LEVEL 6-10 Last 

administered on 1/29/17at 17:58; Admin Dose 0.4 MG;  Start 1/28/17 at 11:00


Bisacodyl (Dulcolax Supp) 10 mg DAILY  PRN RI CONSTIPATION Last administered on 

1/29/17at 14:23; Admin Dose 10 MG;  Start 1/28/17 at 15:00


Docusate Sodium (Colace) 100 mg BID PO  Last administered on 1/30/17at 10:02; 

Admin Dose 100 MG;  Start 1/28/17 at 21:00











GABI MERCER MD Jan 30, 2017 12:27

## 2017-01-30 NOTE — PN
DATE:  01/30/2017

 

 

SUBJECTIVE:  The patient is stable, no acute events overnight.  No fevers, chills, nausea, vomiting.

 

OBJECTIVE:

VITAL SIGNS:  Blood pressure is 120/80, respiration 18, pulse 83, temperature 98.1.

HEENT:  Head is normocephalic.

NECK:  Supple.

HEART:  Regular rate.

LUNGS:  Show diminished breath sounds at the base.

ABDOMEN:  Soft, nontender to palpation without rebound or guarding.

EXTREMITIES:  Negative for clubbing, cyanosis.  Trace edema.

DERMATOLOGIC:  No rashes.

MUSCULOSKELETAL:  No joint effusions.

NEUROLOGIC:  No change in exam.

 

MEDICATIONS:  Have been reviewed.

 

LABORATORY DATA:  Has been reviewed.  No new labs.

 

ASSESSMENT AND PLAN:

1.  Non-ST elevation myocardial infarction status post CABG on 01/06/2017.  The patient has been cli
nically stable.  Continue medical management.

2.  Acute diastolic heart failure.  The patient appears near euvolemic status.  Continue to hold diu
retic therapy due to episodes of orthostatic hypotension.

3.  Orthostatic hypotension.  Etiology may be multifactorial due to autonomic dysfunction, possible 
component of volume depletion.  The patient's diuretic therapy has been held.  We will continue to m
onitor. Consider giving GREGORIO hoses and abdominal binders.

4.  ICU myopathy. Continue medical management.

5.  Anemia.  Continue to monitor hemoglobin and hematocrit levels.

6.  Benign prostatic hypertrophy.  Continue Flomax.

7.  Depression and anxiety disorder.  Continue Prozac and Klonopin.

8.  Neuropathy upper and lower extremities.  Continue physical therapy.

9.  Status post pneumonia.

10.  Status post respiratory failure.

11.  GI and deep venous thrombosis prophylaxis.  Continue proton pump inhibitor and Lovenox.

 

 

Dictated By: CHAVA VELOZ/DIMPLE

DD:    01/30/2017 08:40:10

DT:    01/30/2017 11:18:54

Conf#: [id number]

DID#:  [id number]

## 2017-01-31 VITALS — HEART RATE: 93 BPM | DIASTOLIC BLOOD PRESSURE: 58 MMHG | RESPIRATION RATE: 18 BRPM | SYSTOLIC BLOOD PRESSURE: 103 MMHG

## 2017-01-31 VITALS — RESPIRATION RATE: 18 BRPM | SYSTOLIC BLOOD PRESSURE: 101 MMHG | DIASTOLIC BLOOD PRESSURE: 50 MMHG

## 2017-01-31 RX ADMIN — TAMSULOSIN HYDROCHLORIDE SCH MG: 0.4 CAPSULE ORAL at 20:25

## 2017-01-31 RX ADMIN — DOCUSATE SODIUM SCH MG: 100 CAPSULE, LIQUID FILLED ORAL at 20:37

## 2017-01-31 RX ADMIN — SUCRALFATE SCH GM: 1 SUSPENSION ORAL at 13:23

## 2017-01-31 RX ADMIN — GUAIFENESIN SCH MG: 100 SOLUTION ORAL at 00:00

## 2017-01-31 RX ADMIN — THERA TABS SCH TAB: TAB at 10:01

## 2017-01-31 RX ADMIN — ENOXAPARIN SODIUM SCH MG: 100 INJECTION SUBCUTANEOUS at 10:00

## 2017-01-31 RX ADMIN — SUCRALFATE SCH GM: 1 SUSPENSION ORAL at 00:00

## 2017-01-31 RX ADMIN — FLUOXETINE SCH MG: 20 CAPSULE ORAL at 10:02

## 2017-01-31 RX ADMIN — METOPROLOL TARTRATE SCH MG: 25 TABLET, FILM COATED ORAL at 08:55

## 2017-01-31 RX ADMIN — ATORVASTATIN CALCIUM SCH MG: 80 TABLET, FILM COATED ORAL at 20:25

## 2017-01-31 RX ADMIN — FAMOTIDINE SCH MG: 20 TABLET ORAL at 10:02

## 2017-01-31 RX ADMIN — FAMOTIDINE SCH MG: 20 TABLET ORAL at 20:25

## 2017-01-31 RX ADMIN — ASPIRIN 325 MG ORAL TABLET SCH MG: 325 PILL ORAL at 10:02

## 2017-01-31 RX ADMIN — GUAIFENESIN SCH MG: 100 SOLUTION ORAL at 20:24

## 2017-01-31 RX ADMIN — GUAIFENESIN SCH MG: 100 SOLUTION ORAL at 12:00

## 2017-01-31 RX ADMIN — SUCRALFATE SCH GM: 1 SUSPENSION ORAL at 18:14

## 2017-01-31 RX ADMIN — METOPROLOL TARTRATE SCH MG: 25 TABLET, FILM COATED ORAL at 20:32

## 2017-01-31 RX ADMIN — GUAIFENESIN SCH MG: 100 SOLUTION ORAL at 06:00

## 2017-01-31 RX ADMIN — DOCUSATE SODIUM SCH MG: 100 CAPSULE, LIQUID FILLED ORAL at 10:02

## 2017-01-31 RX ADMIN — GUAIFENESIN SCH MG: 100 SOLUTION ORAL at 18:00

## 2017-01-31 RX ADMIN — SUCRALFATE SCH GM: 1 SUSPENSION ORAL at 06:47

## 2017-01-31 NOTE — PN
DATE:  01/31/2017

 

 

\ SUBJECTIVE:  The patient is stable, no acute events overnight.  No fevers, chills, nausea, vomitin
g, no shortness of breath.

 

OBJECTIVE:

VITAL SIGNS:  Blood pressure is 117/50, respiration 18, pulse 80, temperature 98.7.

HEENT:  Head is normocephalic.

NECK:  Supple.

HEART:  Regular rate.

LUNGS:  Show diminished breath sounds at base.

ABDOMEN:  Soft, nontender to palpation without rebound or guarding.

EXTREMITIES:  Negative for clubbing, cyanosis, no edema.

DERMATOLOGIC:  No rashes.

MUSCULOSKELETAL:  No joint effusions.

NEUROLOGIC:  No change in exam.

 

MEDICATIONS:  The patient's medications have been reviewed.

 

LABORATORY DATA:  Have been reviewed, no new labs.

 

ASSESSMENT AND PLAN:

1.  Non-ST elevation myocardial infarction.  The patient is status post CABG on 01/06/2017.  The pat
ient has been clinically stable.  Continue current medical management.

2.  Acute diastolic heart failure.  The patient is near euvolemic status.  Continue to monitor off d
iuretic therapy.

3.  Orthostatic hypotension.   The patient is clinically improving after holding diuretics, continue
 to monitor.

4.  Intensive Care Unit myopathy.  Continue current physical therapy.

5.  Anemia.  Continue to monitor hemoglobin and hematocrit level.

6.  Benign prostatic hypertrophy. Continue Flomax.  

7.  Depression and anxiety disorder.  Continue Prozac and Klonopin.

8.  Neuropathy.  Continue physical therapy.

9.  Status post pneumonia.

10.  Status post respiratory failure.

11.  GI and deep venous thrombosis  prophylaxis.  Continue proton pump inhibitor and Lovenox.

 

 

Dictated By: CHAVA VELOZ/DIMPLE

DD:    01/31/2017 09:04:44

DT:    01/31/2017 11:50:12

Conf#: [id number]

DID#:  [id number]

## 2017-01-31 NOTE — CONS
Date/Time of Note


Date/Time of Note


DATE: 1/31/17 


TIME: 12:06





Consult Date/Type/Reason


Admit Date/Time


Jan 24, 2017 at 13:35


Type of Consultation:  Infectious Disease





Subjective


Feeling a little better today





Objective


pulm- cta


mod assist transfer


 Vital Signs








  Date Time  Temp Pulse Resp B/P Pulse Ox O2 Delivery O2 Flow Rate FiO2


 


1/31/17 07:30 97.2 70 18 101/50 96   


 


1/31/17 01:02       2.0 


 


1/30/17 20:05      Nasal Cannula  


 


1/28/17 21:52        21














 Intake and Output   


 


 1/30/17 1/30/17 1/31/17





 15:00 23:00 07:00


 


Intake Total  240 ml 300 ml


 


Output Total 500 ml 200 ml 


 


Balance -500 ml 40 ml 300 ml











Results/Medications


Result Diagram:  


1/27/17 0620                                                                   

             1/27/17 0620





Results 24 hrs





Laboratory Tests








Test


  1/30/17


12:27 1/30/17


17:35 1/30/17


20:44 1/31/17


08:02


 


Bedside Glucose 111   108   139   132  








Medications





 Current Medications


Trazodone HCl (Desyrel) 100 mg HS PO  Last administered on 1/30/17at 20:39; 

Admin Dose 100 MG;  Start 1/24/17 at 21:00


Tamsulosin HCl (Flomax) 0.4 mg HS PO  Last administered on 1/30/17at 20:39; 

Admin Dose 0.4 MG;  Start 1/24/17 at 21:00


Oxycodone/ Acetaminophen (Percocet (5/ 325)) 1 tab Q3H  PRN PO PAIN LEVEL 1-5 

Last administered on 1/27/17at 20:26; Admin Dose 1 TAB;  Start 1/24/17 at 15:00


Oxycodone/ Acetaminophen (Percocet (5/ 325)) 2 tab Q3H  PRN PO PAIN LEVEL 6-10 

Last administered on 1/31/17at 04:17; Admin Dose 2 TAB;  Start 1/24/17 at 15:00


Ondansetron HCl (Zofran Inj) 4 mg Q6H  PRN IV NAUSEA AND/OR VOMITING;  Start 1/ 24/17 at 15:00


Sucralfate (Carafate Susp) 1 gm Q6 PO  Last administered on 1/31/17at 06:47; 

Admin Dose 1 GM;  Start 1/24/17 at 18:00


Potassium Chloride (Klor-Con 20) 20 meq DAILY PO  Last administered on 1/28/ 17at 08:05; Admin Dose 20 MEQ;  Start 1/25/17 at 09:00


Multivitamins Therapeutic (Theragran) 1 tab DAILY PO  Last administered on 1/31/ 17at 10:01; Admin Dose 1 TAB;  Start 1/25/17 at 09:00


Miscellaneous Information 1 ea NOTE XX ;  Start 1/24/17 at 15:00


Acetaminophen/ Hydrocodone Bitart (Norco (5/325)) 1 tab Q4H  PRN PO severe pain 

Last administered on 1/28/17at 08:12; Admin Dose 1 TAB;  Start 1/24/17 at 15:00


Guaifenesin (Robitussin Liquid Cup) 200 mg Q6 PO  Last administered on 1/30/ 17at 00:42; Admin Dose 200 MG;  Start 1/24/17 at 18:00


Ketoconazole (Nizoral Cr) 1 applic BID TOP  Last administered on 1/31/17at 10:02

; Admin Dose 1 APPLIC;  Start 1/24/17 at 21:00


Atorvastatin Calcium (Lipitor) 80 mg HS PO  Last administered on 1/30/17at 20:39

; Admin Dose 80 MG;  Start 1/24/17 at 21:00


Fluoxetine HCl (Prozac) 40 mg DAILY PO  Last administered on 1/31/17at 10:02; 

Admin Dose 40 MG;  Start 1/25/17 at 09:00


Clonazepam 1 mg 1 mg BID PO  Last administered on 1/31/17at 10:01; Admin Dose 1 

MG;  Start 1/24/17 at 21:00


Albumin Human 250 ml @  500 mls/hr PRN  PRN IV CVP< 8, OR SBP<90;  Start 1/24/ 17 at 15:00


Acetaminophen (Tylenol Tab) 650 mg Q3H  PRN PO ELEVATED TEMPERATURE Last 

administered on 1/24/17at 17:16; Admin Dose 650 MG;  Start 1/24/17 at 15:00


Diagnostic Test (Pha) (Accucheck) 1 ea 02 XX ;  Start 1/25/17 at 02:00


Glucose (Glutose) 15 gm Q15M  PRN PO DECREASED GLUCOSE;  Start 1/24/17 at 15:00


Glucose (Glutose) 22.5 gm Q15M  PRN PO DECREASED GLUCOSE;  Start 1/24/17 at 15:

00


Dextrose (D50w Syringe) 25 ml Q15M  PRN IV DECREASED GLUCOSE;  Start 1/24/17 at 

15:00


Dextrose (D50w Syringe) 50 ml Q15M  PRN IV DECREASED GLUCOSE;  Start 1/24/17 at 

15:00


Glucagon (Glucagen) 1 mg Q15M  PRN IM DECREASED GLUCOSE;  Start 1/24/17 at 15:00


Glucose (Glutose) 15 gm Q15M  PRN BUCCAL DECREASED GLUCOSE;  Start 1/24/17 at 15

:00


Aspirin (Aspirin) 325 mg DAILY PO  Last administered on 1/31/17at 10:02; Admin 

Dose 325 MG;  Start 1/25/17 at 09:00


Enoxaparin Sodium (Lovenox) 40 mg DAILY SC  Last administered on 1/31/17at 10:00

; Admin Dose 40 MG;  Start 1/25/17 at 09:00


Clonazepam (Klonopin) 1 mg Q8H  PRN PO ANXIETY;  Start 1/24/17 at 15:00


Albuterol/ Ipratropium (Duoneb) 3 ml PRN  PRN HHN WHEEZING AND SOB;  Start 1/24/ 17 at 15:00


Famotidine (Pepcid) 20 mg BID PO  Last administered on 1/31/17at 10:02; Admin 

Dose 20 MG;  Start 1/24/17 at 21:00


Guaifenesin/ Codeine Phosphate (Robitussin Ac Liquid Cup) 10 ml Q4H  PRN PO 

COUGH Last administered on 1/31/17at 06:47; Admin Dose 10 ML;  Start 1/24/17 at 

15:00


Hydromorphone HCl (Dilaudid) 0.2 mg Q1H  PRN IV PAIN LEVEL 1-5;  Start 1/24/17 

at 15:00


Phenol (Cepastat Lozenge) 1 lozenge Q4  PRN PO dry mouth;  Start 1/26/17 at 09:

30


Magnesium Hydroxide (Milk Of Mag) 30 ml DAILY  PRN PO CONSTIPATION Last 

administered on 1/28/17at 14:33; Admin Dose 30 ML;  Start 1/27/17 at 08:30


Metoprolol Tartrate (Lopressor) 12.5 mg BID PO  Last administered on 1/30/17at 

10:01; Admin Dose 12.5 MG;  Start 1/27/17 at 10:00


Hydromorphone HCl (Dilaudid) 0.4 mg Q1H  PRN IM PAIN LEVEL 6-10 Last 

administered on 1/29/17at 17:58; Admin Dose 0.4 MG;  Start 1/28/17 at 11:00


Bisacodyl (Dulcolax Supp) 10 mg DAILY  PRN [place] CONSTIPATION Last administered on 

1/29/17at 14:23; Admin Dose 10 MG;  Start 1/28/17 at 15:00


Docusate Sodium (Colace) 100 mg BID PO  Last administered on 1/31/17at 10:02; 

Admin Dose 100 MG;  Start 1/28/17 at 21:00





Assessment/Plan


Additional Assessment/Plan


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  History of cervical radiculopathy and 

neuropathy.


   Continue activities as tolerated GREGORIO hose for OOB


 Status post respiratory failure.


Status post non-ST elevation myocardial infarction and coronary artery bypass 

graft.


Dysphagia, on mechanical soft diet.


Fungal cellulitis.


Benign prostatic hypertrophy.


 History of anxiety and depression.











GABI MERCER MD Jan 31, 2017 12:07

## 2017-02-01 VITALS — RESPIRATION RATE: 18 BRPM | HEART RATE: 84 BPM | DIASTOLIC BLOOD PRESSURE: 62 MMHG | SYSTOLIC BLOOD PRESSURE: 132 MMHG

## 2017-02-01 VITALS — SYSTOLIC BLOOD PRESSURE: 115 MMHG | RESPIRATION RATE: 18 BRPM | DIASTOLIC BLOOD PRESSURE: 57 MMHG

## 2017-02-01 VITALS — HEART RATE: 86 BPM | SYSTOLIC BLOOD PRESSURE: 117 MMHG | DIASTOLIC BLOOD PRESSURE: 55 MMHG | RESPIRATION RATE: 18 BRPM

## 2017-02-01 LAB
ANION GAP SERPL CALC-SCNC: 14 MMOL/L (ref 8–16)
BASOPHILS # BLD AUTO: 0 10^3/UL (ref 0–0.1)
BASOPHILS NFR BLD: 0.3 % (ref 0–2)
BUN SERPL-MCNC: 14 MG/DL (ref 7–20)
CALCIUM SERPL-MCNC: 8.4 MG/DL (ref 8.4–10.2)
CHLORIDE SERPL-SCNC: 97 MMOL/L (ref 97–110)
CO2 SERPL-SCNC: 33 MMOL/L (ref 21–31)
CONDITION: 1
CREAT SERPL-MCNC: 0.89 MG/DL (ref 0.61–1.24)
EOSINOPHIL # BLD: 0 10^3/UL (ref 0–0.5)
EOSINOPHIL NFR BLD: 0.3 % (ref 0–7)
ERYTHROCYTE [DISTWIDTH] IN BLOOD BY AUTOMATED COUNT: 14.4 % (ref 11.5–14.5)
GLUCOSE SERPL-MCNC: 85 MG/DL (ref 70–220)
HCT VFR BLD CALC: 30.6 % (ref 42–52)
HGB BLD-MCNC: 10.4 G/DL (ref 14–18)
LH ANALYZER COMMENTS: 1
LYMPHOCYTES # BLD AUTO: 0.8 10^3/UL (ref 0.8–2.9)
LYMPHOCYTES NFR BLD AUTO: 18.7 % (ref 15–51)
MAGNESIUM SERPL-MCNC: 2.4 MG/DL (ref 1.7–2.5)
MCH RBC QN AUTO: 29.4 PG (ref 29–33)
MCHC RBC AUTO-ENTMCNC: 34.1 G/DL (ref 32–37)
MCV RBC AUTO: 86.2 FL (ref 82–101)
MONOCYTES # BLD: 0.8 10^3/UL (ref 0.3–0.9)
MONOCYTES NFR BLD: 19.4 % (ref 0–11)
NEUTROPHILS # BLD: 2.6 10^3/UL (ref 1.6–7.5)
NEUTROPHILS NFR BLD AUTO: 61.3 % (ref 39–77)
NRBC # BLD MANUAL: 0 10^3/UL (ref 0–0)
NRBC BLD QL: 0 /100WBC (ref 0–0)
PHOSPHATE SERPL-MCNC: 3.7 MG/DL (ref 2.5–4.9)
PLATELET # BLD: 233 10^3/UL (ref 140–440)
PMV BLD AUTO: 8.8 FL (ref 7.4–10.4)
POTASSIUM SERPL-SCNC: 3.8 MMOL/L (ref 3.5–5.1)
RBC # BLD AUTO: 3.55 10^6/UL (ref 4.7–6.1)
SODIUM SERPL-SCNC: 140 MMOL/L (ref 135–144)
WBC # BLD AUTO: 4.3 10^3/UL (ref 4.8–10.8)
WBC # BLD: 4.3 10^3/UL (ref 4.8–10.8)

## 2017-02-01 RX ADMIN — FAMOTIDINE SCH MG: 20 TABLET ORAL at 20:30

## 2017-02-01 RX ADMIN — THERA TABS SCH TAB: TAB at 09:04

## 2017-02-01 RX ADMIN — ENOXAPARIN SODIUM SCH MG: 100 INJECTION SUBCUTANEOUS at 09:10

## 2017-02-01 RX ADMIN — SUCRALFATE SCH GM: 1 SUSPENSION ORAL at 06:20

## 2017-02-01 RX ADMIN — METOPROLOL TARTRATE SCH MG: 25 TABLET, FILM COATED ORAL at 09:00

## 2017-02-01 RX ADMIN — DOCUSATE SODIUM SCH MG: 100 CAPSULE, LIQUID FILLED ORAL at 09:04

## 2017-02-01 RX ADMIN — ATORVASTATIN CALCIUM SCH MG: 80 TABLET, FILM COATED ORAL at 20:29

## 2017-02-01 RX ADMIN — FAMOTIDINE SCH MG: 20 TABLET ORAL at 09:04

## 2017-02-01 RX ADMIN — METOPROLOL TARTRATE SCH MG: 25 TABLET, FILM COATED ORAL at 20:29

## 2017-02-01 RX ADMIN — GUAIFENESIN SCH MG: 100 SOLUTION ORAL at 06:20

## 2017-02-01 RX ADMIN — GUAIFENESIN SCH MG: 100 SOLUTION ORAL at 17:32

## 2017-02-01 RX ADMIN — ASPIRIN 325 MG ORAL TABLET SCH MG: 325 PILL ORAL at 09:04

## 2017-02-01 RX ADMIN — SUCRALFATE SCH GM: 1 SUSPENSION ORAL at 13:05

## 2017-02-01 RX ADMIN — FLUOXETINE SCH MG: 20 CAPSULE ORAL at 09:04

## 2017-02-01 RX ADMIN — SUCRALFATE SCH GM: 1 SUSPENSION ORAL at 00:00

## 2017-02-01 RX ADMIN — SUCRALFATE SCH GM: 1 SUSPENSION ORAL at 17:33

## 2017-02-01 RX ADMIN — DOCUSATE SODIUM SCH MG: 100 CAPSULE, LIQUID FILLED ORAL at 20:28

## 2017-02-01 RX ADMIN — GUAIFENESIN SCH MG: 100 SOLUTION ORAL at 13:05

## 2017-02-01 RX ADMIN — TAMSULOSIN HYDROCHLORIDE SCH MG: 0.4 CAPSULE ORAL at 20:29

## 2017-02-01 NOTE — CONS
Date/Time of Note


Date/Time of Note


DATE: 2/1/17 


TIME: 11:21





Consult Date/Type/Reason


Admit Date/Time


Jan 24, 2017 at 13:35


Type of Consultation:  Infectious Disease





Subjective


tired





Objective





 Vital Signs








  Date Time  Temp Pulse Resp B/P Pulse Ox O2 Delivery O2 Flow Rate FiO2


 


2/1/17 08:00 98.0 86 18 117/55 96 Room Air  


 


2/1/17 08:00       2.0 


 


1/28/17 21:52        21














 Intake and Output   


 


 1/31/17 1/31/17 2/1/17





 14:59 22:59 06:59


 


Intake Total 830 ml 240 ml 200 ml


 


Output Total  300 ml 100 ml


 


Balance 830 ml -60 ml 100 ml





pulm- cta


mod assist 10 feet ambulation





Results/Medications


Result Diagram:  


2/1/17 0620                                                                    

            2/1/17 0620





Results 24 hrs





Laboratory Tests








Test


  1/31/17


12:03 1/31/17


17:00 1/31/17


20:30 2/1/17


06:20


 


Bedside Glucose 108   106   110   


 


Anion Gap    14  


 


Basophils #    0.0  


 


Basophils %    0.3  


 


Blood Urea Nitrogen    14  


 


Calcium Level    8.4  


 


Carbon Dioxide Level    33  H


 


Chloride Level    97  


 


Creatinine    0.89  


 


Eosinophils #    0.0  


 


Eosinophils %    0.3  


 


Glucose Level    85  


 


Hematocrit    30.6  L


 


Hemoglobin    10.4  L


 


Lymphocytes #    0.8  


 


Lymphocytes %    18.7  


 


Magnesium Level    2.4  


 


Mean Corpuscular Hemoglobin    29.4  


 


Mean Corpuscular Hemoglobin


Concent 


  


  


  34.1  


 


 


Mean Corpuscular Volume    86.2  


 


Mean Platelet Volume    8.8  


 


Monocytes #    0.8  


 


Monocytes %    19.4  H


 


Neutrophils #    2.6  


 


Neutrophils %    61.3  


 


Nucleated Red Blood Cells #    0.0  


 


Nucleated Red Blood Cells %    0.0  


 


Phosphorus Level    3.7  


 


Platelet Count    233  


 


Potassium Level    3.8  


 


Red Blood Count    3.55  L


 


Red Cell Distribution Width    14.4  


 


Sodium Level    140  


 


White Blood Count    4.3  L














Test


  2/1/17


07:34 


  


  


 


 


Bedside Glucose 96     








Medications





 Current Medications


Trazodone HCl (Desyrel) 100 mg HS PO  Last administered on 1/31/17at 20:25; 

Admin Dose 100 MG;  Start 1/24/17 at 21:00


Tamsulosin HCl (Flomax) 0.4 mg HS PO  Last administered on 1/31/17at 20:25; 

Admin Dose 0.4 MG;  Start 1/24/17 at 21:00


Oxycodone/ Acetaminophen (Percocet (5/ 325)) 1 tab Q3H  PRN PO PAIN LEVEL 1-5 

Last administered on 1/27/17at 20:26; Admin Dose 1 TAB;  Start 1/24/17 at 15:00


Oxycodone/ Acetaminophen (Percocet (5/ 325)) 2 tab Q3H  PRN PO PAIN LEVEL 6-10 

Last administered on 2/1/17at 01:10; Admin Dose 2 TAB;  Start 1/24/17 at 15:00


Ondansetron HCl (Zofran Inj) 4 mg Q6H  PRN IV NAUSEA AND/OR VOMITING;  Start 1/ 24/17 at 15:00


Sucralfate (Carafate Susp) 1 gm Q6 PO  Last administered on 2/1/17at 06:20; 

Admin Dose 1 GM;  Start 1/24/17 at 18:00


Potassium Chloride (Klor-Con 20) 20 meq DAILY PO  Last administered on 1/28/ 17at 08:05; Admin Dose 20 MEQ;  Start 1/25/17 at 09:00


Multivitamins Therapeutic (Theragran) 1 tab DAILY PO  Last administered on 2/1/ 17at 09:04; Admin Dose 1 TAB;  Start 1/25/17 at 09:00


Miscellaneous Information 1 ea NOTE XX ;  Start 1/24/17 at 15:00


Acetaminophen/ Hydrocodone Bitart (Norco (5/325)) 1 tab Q4H  PRN PO severe pain 

Last administered on 1/28/17at 08:12; Admin Dose 1 TAB;  Start 1/24/17 at 15:00


Guaifenesin (Robitussin Liquid Cup) 200 mg Q6 PO  Last administered on 2/1/17at 

06:20; Admin Dose 200 MG;  Start 1/24/17 at 18:00


Ketoconazole (Nizoral Cr) 1 applic BID TOP  Last administered on 2/1/17at 09:14

; Admin Dose 1 APPLIC;  Start 1/24/17 at 21:00


Atorvastatin Calcium (Lipitor) 80 mg HS PO  Last administered on 1/31/17at 20:25

; Admin Dose 80 MG;  Start 1/24/17 at 21:00


Fluoxetine HCl (Prozac) 40 mg DAILY PO  Last administered on 2/1/17at 09:04; 

Admin Dose 40 MG;  Start 1/25/17 at 09:00


Clonazepam 1 mg 1 mg BID PO  Last administered on 2/1/17at 09:04; Admin Dose 1 

MG;  Start 1/24/17 at 21:00


Albumin Human 250 ml @  500 mls/hr PRN  PRN IV CVP< 8, OR SBP<90;  Start 1/24/ 17 at 15:00


Acetaminophen (Tylenol Tab) 650 mg Q3H  PRN PO ELEVATED TEMPERATURE Last 

administered on 1/31/17at 18:14; Admin Dose 650 MG;  Start 1/24/17 at 15:00


Diagnostic Test (Pha) (Accucheck) 1 ea 02 XX ;  Start 1/25/17 at 02:00


Glucose (Glutose) 15 gm Q15M  PRN PO DECREASED GLUCOSE;  Start 1/24/17 at 15:00


Glucose (Glutose) 22.5 gm Q15M  PRN PO DECREASED GLUCOSE;  Start 1/24/17 at 15:

00


Dextrose (D50w Syringe) 25 ml Q15M  PRN IV DECREASED GLUCOSE;  Start 1/24/17 at 

15:00


Dextrose (D50w Syringe) 50 ml Q15M  PRN IV DECREASED GLUCOSE;  Start 1/24/17 at 

15:00


Glucagon (Glucagen) 1 mg Q15M  PRN IM DECREASED GLUCOSE;  Start 1/24/17 at 15:00


Glucose (Glutose) 15 gm Q15M  PRN BUCCAL DECREASED GLUCOSE;  Start 1/24/17 at 15

:00


Aspirin (Aspirin) 325 mg DAILY PO  Last administered on 2/1/17at 09:04; Admin 

Dose 325 MG;  Start 1/25/17 at 09:00


Enoxaparin Sodium (Lovenox) 40 mg DAILY SC  Last administered on 2/1/17at 09:10

; Admin Dose 40 MG;  Start 1/25/17 at 09:00


Clonazepam (Klonopin) 1 mg Q8H  PRN PO ANXIETY;  Start 1/24/17 at 15:00


Albuterol/ Ipratropium (Duoneb) 3 ml PRN  PRN HHN WHEEZING AND SOB;  Start 1/24/ 17 at 15:00


Famotidine (Pepcid) 20 mg BID PO  Last administered on 2/1/17at 09:04; Admin 

Dose 20 MG;  Start 1/24/17 at 21:00


Guaifenesin/ Codeine Phosphate (Robitussin Ac Liquid Cup) 10 ml Q4H  PRN PO 

COUGH Last administered on 1/31/17at 13:22; Admin Dose 10 ML;  Start 1/24/17 at 

15:00


Hydromorphone HCl (Dilaudid) 0.2 mg Q1H  PRN IV PAIN LEVEL 1-5;  Start 1/24/17 

at 15:00


Phenol (Cepastat Lozenge) 1 lozenge Q4  PRN PO dry mouth;  Start 1/26/17 at 09:

30


Magnesium Hydroxide (Milk Of Mag) 30 ml DAILY  PRN PO CONSTIPATION Last 

administered on 1/28/17at 14:33; Admin Dose 30 ML;  Start 1/27/17 at 08:30


Metoprolol Tartrate (Lopressor) 12.5 mg BID PO  Last administered on 1/30/17at 

10:01; Admin Dose 12.5 MG;  Start 1/27/17 at 10:00


Hydromorphone HCl (Dilaudid) 0.4 mg Q1H  PRN IM PAIN LEVEL 6-10 Last 

administered on 1/29/17at 17:58; Admin Dose 0.4 MG;  Start 1/28/17 at 11:00


Bisacodyl (Dulcolax Supp) 10 mg DAILY  PRN [place] CONSTIPATION Last administered on 

1/29/17at 14:23; Admin Dose 10 MG;  Start 1/28/17 at 15:00


Docusate Sodium (Colace) 100 mg BID PO  Last administered on 2/1/17at 09:04; 

Admin Dose 100 MG;  Start 1/28/17 at 21:00





Assessment/Plan


Additional Assessment/Plan


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  History of cervical radiculopathy and 

neuropathy.


   Continue activities as tolerated GREGORIO hose for OOB. Abdominal binder added 

for oob


 Status post respiratory failure.


Status post non-ST elevation myocardial infarction and coronary artery bypass 

graft.


Dysphagia, on mechanical soft diet.


Fungal cellulitis.


Benign prostatic hypertrophy.


 History of anxiety and depression.











GABI MERCER MD Feb 1, 2017 11:23

## 2017-02-01 NOTE — PN
DATE:  02/01/2017

 

 

SUBJECTIVE:  The patient is stable, no acute events noted.  No fevers, chills, nausea, vomiting. No 
shortness of breath.

 

OBJECTIVE:

VITAL SIGNS:  Blood pressure 103/58, respiration 18, pulse 73, temperature 98.0.

I'S AND O'S:  The patient had 1200 in with multiple voids.

HEENT:  Head is normocephalic.

NECK:  Supple.

HEART:  Regular rate.

LUNGS:  Show diminished breath sounds at the base.

ABDOMEN:  Soft, nontender to palpation without rebound or guarding.

EXTREMITIES:  Negative for clubbing, cyanosis.  Trace edema.

DERMATOLOGIC:  No rashes.

MUSCULOSKELETAL:  No joint effusions.

NEUROLOGIC:  No change in exam.

 

MEDICATIONS:  The patient's medications have been reviewed.

 

LABORATORY DATA:  Shows a sodium 140, potassium 3.8, chloride 97, BUN 14, creatinine 0.89.  White co
unt 4.3, hemoglobin 10.4, hematocrit 30.6, platelet count 233.

 

ASSESSMENT AND PLAN:

1.  Non-ST elevation myocardial infarction.  The patient is status post CABG. Clinically stable.  Co
ntinue medical management.

2.  Acute diastolic heart failure.  The patient is near euvolemic status.  Continue to monitor off d
iuretic therapy.

3.  Orthostatic hypotension.  Etiology may be due to autonomic dysfunction.  Diuretic therapy has be
en held. We will continue to monitor.  Would recommend compression stockings and abdominal binders.

4.  IC myopathy improving.  Continue physical therapy.

5.  Anemia.  Continue to monitor H and H levels.

6.  Benign prostatic hypertrophy.  Continue Flomax.

7.  Depression.

8.  Anxiety disorder. Continue Prozac, Klonopin. 

9.  Neuropathy.  Continue physical therapy.

10.  Status post pneumonia.

11.  Status post respiratory failure.

12.  GI and DVT prophylaxis.  Continue proton pump inhibitor and Lovenox.

 

 

Dictated By: CHAVA GARZA DO

 

NR/DIMPLE

DD:    02/01/2017 08:14:57

DT:    02/01/2017 11:53:05

Conf#: [id number]

DID#:  [id number]

## 2017-02-01 NOTE — CONS
DATE OF ADMISSION: 01/24/2017

DATE OF CONSULTATION:  02/01/2017

 

 

 

PSYCHOLOGY--INDIVIDUAL SESSION--[id number]

 

This is a followup on a patient who was seen last week.  The patient was seen in bed.  The patient i
s feeling frustrated at the present time by his emotional and physical condition.  The patient said 
that he had a lousy day yesterday.  The patient is having difficulty adapting to his blood pressure 
fluctuations, which are causing him to feel like he cannot function and walk.  The patient is verbal
 about these issues and is trying to do the best he can to function as well as he can so that he can
 return to his previous level of functioning.  The patient does feel weak and is having difficulty. 
 The patient was able to talk about this in therapy, which did seem to help his mood some.

 

 

Dictated By: KATEY ANDERSON PHD

 

VIKTORIYA/DIMPLE

DD:    02/01/2017 16:44:57

DT:    02/01/2017 17:31:10

Conf#: [id number]

DID#:  [id number]

## 2017-02-02 VITALS — DIASTOLIC BLOOD PRESSURE: 73 MMHG | RESPIRATION RATE: 18 BRPM | SYSTOLIC BLOOD PRESSURE: 135 MMHG

## 2017-02-02 VITALS — DIASTOLIC BLOOD PRESSURE: 70 MMHG | SYSTOLIC BLOOD PRESSURE: 105 MMHG | RESPIRATION RATE: 18 BRPM

## 2017-02-02 RX ADMIN — SUCRALFATE SCH GM: 1 SUSPENSION ORAL at 11:54

## 2017-02-02 RX ADMIN — METOPROLOL TARTRATE SCH MG: 25 TABLET, FILM COATED ORAL at 08:23

## 2017-02-02 RX ADMIN — SUCRALFATE SCH GM: 1 SUSPENSION ORAL at 17:38

## 2017-02-02 RX ADMIN — TAMSULOSIN HYDROCHLORIDE SCH MG: 0.4 CAPSULE ORAL at 20:43

## 2017-02-02 RX ADMIN — GUAIFENESIN SCH MG: 100 SOLUTION ORAL at 11:55

## 2017-02-02 RX ADMIN — THERA TABS SCH TAB: TAB at 08:22

## 2017-02-02 RX ADMIN — ENOXAPARIN SODIUM SCH MG: 100 INJECTION SUBCUTANEOUS at 08:25

## 2017-02-02 RX ADMIN — BISACODYL PRN MG: 10 SUPPOSITORY RECTAL at 16:44

## 2017-02-02 RX ADMIN — DOCUSATE SODIUM SCH MG: 100 CAPSULE, LIQUID FILLED ORAL at 08:24

## 2017-02-02 RX ADMIN — SUCRALFATE SCH GM: 1 SUSPENSION ORAL at 06:20

## 2017-02-02 RX ADMIN — FAMOTIDINE SCH MG: 20 TABLET ORAL at 20:44

## 2017-02-02 RX ADMIN — SUCRALFATE SCH GM: 1 SUSPENSION ORAL at 00:00

## 2017-02-02 RX ADMIN — GUAIFENESIN SCH MG: 100 SOLUTION ORAL at 00:00

## 2017-02-02 RX ADMIN — METOPROLOL TARTRATE SCH MG: 25 TABLET, FILM COATED ORAL at 20:46

## 2017-02-02 RX ADMIN — ATORVASTATIN CALCIUM SCH MG: 80 TABLET, FILM COATED ORAL at 20:43

## 2017-02-02 RX ADMIN — DOCUSATE SODIUM SCH MG: 100 CAPSULE, LIQUID FILLED ORAL at 20:43

## 2017-02-02 RX ADMIN — GUAIFENESIN SCH MG: 100 SOLUTION ORAL at 17:39

## 2017-02-02 RX ADMIN — GUAIFENESIN SCH MG: 100 SOLUTION ORAL at 06:20

## 2017-02-02 RX ADMIN — ASPIRIN 325 MG ORAL TABLET SCH MG: 325 PILL ORAL at 08:24

## 2017-02-02 RX ADMIN — FLUOXETINE SCH MG: 20 CAPSULE ORAL at 08:23

## 2017-02-02 RX ADMIN — FAMOTIDINE SCH MG: 20 TABLET ORAL at 08:23

## 2017-02-02 NOTE — PN
DATE:  02/02/2017

 

 

SUBJECTIVE:  The patient is stable, no acute events overnight.  No fevers, chills, nausea, vomiting.

 

OBJECTIVE:

VITAL SIGNS:  Blood pressure 135/72, respiration 18, pulse ____, temperature 97.7.

HEENT:  Head is normocephalic.

NECK:  Supple.

HEART:  Regular rate.

LUNGS:  Show diminished breath sounds at base.

ABDOMEN:  Soft, nontender to palpation.  No rebound or guarding.

EXTREMITIES:  Negative for clubbing, cyanosis.  Trace edema.

DERMATOLOGIC:  No rashes.

MUSCULOSKELETAL:  No joint effusions.

NEUROLOGIC:  No change in exam.

 

MEDICATIONS:  The patient's medications have been reviewed.

 

LABORATORY DATA:  Have been reviewed.

 

ASSESSMENT AND PLAN:

1.  Non-ST elevation myocardial infarction, status post coronary artery bypass graft.  The patient i
s clinically stable.  Continue current medical management.

2.  Acute diastolic heart failure.  The patient appears euvolemic.  Will continue to monitor off diu
retic therapy.

3.  Orthostatic hypotension.  Etiology is multifactorial secondary to autonomic dysfunction, diureti
cs.  Blood pressures have improved.  Continue to monitor, continue compression socks and abdominal b
inders.

4.  ICU myopathy, improving.  Continue physical therapy.

5.  Anemia.  Continue to monitor hemoglobin and hematocrit levels.

6.  Benign prostatic hypertrophy.  Continue Flomax.

7.  Depression and anxiety disorder.  Continue Prozac and Klonopin.

8.  Neuropathy.  Continue physical therapy.

9.  Mild leukopenia.  Continue to monitor.

10.  Status post pneumonia.

11.  Status post respiratory failure.

12.  Gastrointestinal and deep venous thrombosis prophylaxis.  Continue proton pump inhibitor and Lo
venox.

 

 

Dictated By: CHAVA VELOZ/DIMPLE

DD:    02/02/2017 09:29:25

DT:    02/02/2017 12:09:25

Conf#: [id number]

DID#:  [id number]

## 2017-02-02 NOTE — CONS
Date/Time of Note


Date/Time of Note


DATE: 2/2/17 


TIME: 11:59





Consult Date/Type/Reason


Admit Date/Time


Jan 24, 2017 at 13:35


Type of Consultation:  Infectious Disease





Subjective


no new complaints





Objective


pulm- cta


 mod assist 20 feet


 Vital Signs








  Date Time  Temp Pulse Resp B/P Pulse Ox O2 Delivery O2 Flow Rate FiO2


 


2/2/17 08:10 97.7 70 18 135/73 96   


 


2/1/17 20:30      Nasal Cannula 2.0 














 Intake and Output   


 


 2/1/17 2/1/17 2/2/17





 15:00 23:00 07:00


 


Intake Total  720 ml 750 ml


 


Output Total  200 ml 650 ml


 


Balance  520 ml 100 ml











Results/Medications


Result Diagram:  


2/1/17 0620 2/1/17 0620





Results 24 hrs





Laboratory Tests








Test


  2/1/17


12:04 2/1/17


17:12 2/1/17


20:22 2/2/17


07:26


 


Bedside Glucose 102   99   122   97  














Test


  2/2/17


11:58 


  


  


 


 


Bedside Glucose 100     








Medications





 Current Medications


Trazodone HCl (Desyrel) 100 mg HS PO  Last administered on 2/1/17at 20:29; 

Admin Dose 100 MG;  Start 1/24/17 at 21:00


Tamsulosin HCl (Flomax) 0.4 mg HS PO  Last administered on 2/1/17at 20:29; 

Admin Dose 0.4 MG;  Start 1/24/17 at 21:00


Oxycodone/ Acetaminophen (Percocet (5/ 325)) 1 tab Q3H  PRN PO PAIN LEVEL 1-5 

Last administered on 1/27/17at 20:26; Admin Dose 1 TAB;  Start 1/24/17 at 15:00


Oxycodone/ Acetaminophen (Percocet (5/ 325)) 2 tab Q3H  PRN PO PAIN LEVEL 6-10 

Last administered on 2/2/17at 01:38; Admin Dose 2 TAB;  Start 1/24/17 at 15:00


Ondansetron HCl (Zofran Inj) 4 mg Q6H  PRN IV NAUSEA AND/OR VOMITING;  Start 1/ 24/17 at 15:00


Sucralfate (Carafate Susp) 1 gm Q6 PO  Last administered on 2/2/17at 06:20; 

Admin Dose 1 GM;  Start 1/24/17 at 18:00


Potassium Chloride (Klor-Con 20) 20 meq DAILY PO  Last administered on 1/28/ 17at 08:05; Admin Dose 20 MEQ;  Start 1/25/17 at 09:00


Multivitamins Therapeutic (Theragran) 1 tab DAILY PO  Last administered on 2/2/ 17at 08:22; Admin Dose 1 TAB;  Start 1/25/17 at 09:00


Miscellaneous Information 1 ea NOTE XX ;  Start 1/24/17 at 15:00


Acetaminophen/ Hydrocodone Bitart (Norco (5/325)) 1 tab Q4H  PRN PO severe pain 

Last administered on 1/28/17at 08:12; Admin Dose 1 TAB;  Start 1/24/17 at 15:00


Guaifenesin (Robitussin Liquid Cup) 200 mg Q6 PO  Last administered on 2/2/17at 

06:20; Admin Dose 200 MG;  Start 1/24/17 at 18:00


Ketoconazole (Nizoral Cr) 1 applic BID TOP  Last administered on 2/2/17at 08:21

; Admin Dose 1 APPLIC;  Start 1/24/17 at 21:00


Atorvastatin Calcium (Lipitor) 80 mg HS PO  Last administered on 2/1/17at 20:29

; Admin Dose 80 MG;  Start 1/24/17 at 21:00


Fluoxetine HCl (Prozac) 40 mg DAILY PO  Last administered on 2/2/17at 08:23; 

Admin Dose 40 MG;  Start 1/25/17 at 09:00


Clonazepam 1 mg 1 mg BID PO  Last administered on 2/2/17at 08:24; Admin Dose 1 

MG;  Start 1/24/17 at 21:00


Albumin Human 250 ml @  500 mls/hr PRN  PRN IV CVP< 8, OR SBP<90;  Start 1/24/ 17 at 15:00


Acetaminophen (Tylenol Tab) 650 mg Q3H  PRN PO ELEVATED TEMPERATURE Last 

administered on 1/31/17at 18:14; Admin Dose 650 MG;  Start 1/24/17 at 15:00


Diagnostic Test (Pha) (Accucheck) 1 ea 02 XX ;  Start 1/25/17 at 02:00


Glucose (Glutose) 15 gm Q15M  PRN PO DECREASED GLUCOSE;  Start 1/24/17 at 15:00


Glucose (Glutose) 22.5 gm Q15M  PRN PO DECREASED GLUCOSE;  Start 1/24/17 at 15:

00


Dextrose (D50w Syringe) 25 ml Q15M  PRN IV DECREASED GLUCOSE;  Start 1/24/17 at 

15:00


Dextrose (D50w Syringe) 50 ml Q15M  PRN IV DECREASED GLUCOSE;  Start 1/24/17 at 

15:00


Glucagon (Glucagen) 1 mg Q15M  PRN IM DECREASED GLUCOSE;  Start 1/24/17 at 15:00


Glucose (Glutose) 15 gm Q15M  PRN BUCCAL DECREASED GLUCOSE;  Start 1/24/17 at 15

:00


Aspirin (Aspirin) 325 mg DAILY PO  Last administered on 2/2/17at 08:24; Admin 

Dose 325 MG;  Start 1/25/17 at 09:00


Enoxaparin Sodium (Lovenox) 40 mg DAILY SC  Last administered on 2/2/17at 08:25

; Admin Dose 40 MG;  Start 1/25/17 at 09:00


Clonazepam (Klonopin) 1 mg Q8H  PRN PO ANXIETY;  Start 1/24/17 at 15:00


Albuterol/ Ipratropium (Duoneb) 3 ml PRN  PRN HHN WHEEZING AND SOB;  Start 1/24/ 17 at 15:00


Famotidine (Pepcid) 20 mg BID PO  Last administered on 2/2/17at 08:23; Admin 

Dose 20 MG;  Start 1/24/17 at 21:00


Guaifenesin/ Codeine Phosphate (Robitussin Ac Liquid Cup) 10 ml Q4H  PRN PO 

COUGH Last administered on 1/31/17at 13:22; Admin Dose 10 ML;  Start 1/24/17 at 

15:00


Hydromorphone HCl (Dilaudid) 0.2 mg Q1H  PRN IV PAIN LEVEL 1-5;  Start 1/24/17 

at 15:00


Phenol (Cepastat Lozenge) 1 lozenge Q4  PRN PO dry mouth;  Start 1/26/17 at 09:

30


Magnesium Hydroxide (Milk Of Mag) 30 ml DAILY  PRN PO CONSTIPATION Last 

administered on 2/1/17at 18:12; Admin Dose 30 ML;  Start 1/27/17 at 08:30


Metoprolol Tartrate (Lopressor) 12.5 mg BID PO  Last administered on 2/2/17at 08

:23; Admin Dose 12.5 MG;  Start 1/27/17 at 10:00


Hydromorphone HCl (Dilaudid) 0.4 mg Q1H  PRN IM PAIN LEVEL 6-10 Last 

administered on 1/29/17at 17:58; Admin Dose 0.4 MG;  Start 1/28/17 at 11:00


Bisacodyl (Dulcolax Supp) 10 mg DAILY  PRN GA CONSTIPATION Last administered on 

1/29/17at 14:23; Admin Dose 10 MG;  Start 1/28/17 at 15:00


Docusate Sodium (Colace) 100 mg BID PO  Last administered on 2/2/17at 08:24; 

Admin Dose 100 MG;  Start 1/28/17 at 21:00





Assessment/Plan


Additional Assessment/Plan


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  History of cervical radiculopathy and 

neuropathy.


   Overall improving with rehab program. Continue treatment plan


 Status post respiratory failure.


Status post non-ST elevation myocardial infarction and coronary artery bypass 

graft.


Dysphagia, tolerating mechanical soft diet.


Fungal cellulitis.


Benign prostatic hypertrophy.


 History of anxiety and depression.











GABI MERCER MD Feb 2, 2017 12:03

## 2017-02-03 VITALS — HEART RATE: 82 BPM | RESPIRATION RATE: 18 BRPM | SYSTOLIC BLOOD PRESSURE: 152 MMHG | DIASTOLIC BLOOD PRESSURE: 74 MMHG

## 2017-02-03 VITALS — DIASTOLIC BLOOD PRESSURE: 57 MMHG | RESPIRATION RATE: 20 BRPM | HEART RATE: 80 BPM | SYSTOLIC BLOOD PRESSURE: 126 MMHG

## 2017-02-03 VITALS — RESPIRATION RATE: 18 BRPM | SYSTOLIC BLOOD PRESSURE: 114 MMHG | HEART RATE: 74 BPM | DIASTOLIC BLOOD PRESSURE: 53 MMHG

## 2017-02-03 VITALS — HEART RATE: 82 BPM | RESPIRATION RATE: 16 BRPM | SYSTOLIC BLOOD PRESSURE: 102 MMHG | DIASTOLIC BLOOD PRESSURE: 64 MMHG

## 2017-02-03 VITALS — RESPIRATION RATE: 18 BRPM | SYSTOLIC BLOOD PRESSURE: 114 MMHG | DIASTOLIC BLOOD PRESSURE: 65 MMHG

## 2017-02-03 VITALS — RESPIRATION RATE: 18 BRPM | HEART RATE: 74 BPM | SYSTOLIC BLOOD PRESSURE: 121 MMHG | DIASTOLIC BLOOD PRESSURE: 74 MMHG

## 2017-02-03 VITALS — SYSTOLIC BLOOD PRESSURE: 113 MMHG | DIASTOLIC BLOOD PRESSURE: 59 MMHG | RESPIRATION RATE: 18 BRPM | HEART RATE: 76 BPM

## 2017-02-03 VITALS — HEART RATE: 8 BPM | RESPIRATION RATE: 20 BRPM | SYSTOLIC BLOOD PRESSURE: 119 MMHG | DIASTOLIC BLOOD PRESSURE: 74 MMHG

## 2017-02-03 VITALS — RESPIRATION RATE: 18 BRPM | SYSTOLIC BLOOD PRESSURE: 114 MMHG | DIASTOLIC BLOOD PRESSURE: 71 MMHG | HEART RATE: 78 BPM

## 2017-02-03 RX ADMIN — GUAIFENESIN SCH MG: 100 SOLUTION ORAL at 17:12

## 2017-02-03 RX ADMIN — FAMOTIDINE SCH MG: 20 TABLET ORAL at 08:35

## 2017-02-03 RX ADMIN — GUAIFENESIN SCH MG: 100 SOLUTION ORAL at 06:00

## 2017-02-03 RX ADMIN — METOPROLOL TARTRATE SCH MG: 25 TABLET, FILM COATED ORAL at 08:36

## 2017-02-03 RX ADMIN — GUAIFENESIN SCH MG: 100 SOLUTION ORAL at 00:00

## 2017-02-03 RX ADMIN — SUCRALFATE SCH GM: 1 SUSPENSION ORAL at 17:12

## 2017-02-03 RX ADMIN — ATORVASTATIN CALCIUM SCH MG: 80 TABLET, FILM COATED ORAL at 20:07

## 2017-02-03 RX ADMIN — GUAIFENESIN SCH MG: 100 SOLUTION ORAL at 12:00

## 2017-02-03 RX ADMIN — FLUOXETINE SCH MG: 20 CAPSULE ORAL at 08:35

## 2017-02-03 RX ADMIN — SUCRALFATE SCH GM: 1 SUSPENSION ORAL at 06:30

## 2017-02-03 RX ADMIN — THERA TABS SCH TAB: TAB at 08:36

## 2017-02-03 RX ADMIN — DOCUSATE SODIUM SCH MG: 100 CAPSULE, LIQUID FILLED ORAL at 20:07

## 2017-02-03 RX ADMIN — METOPROLOL TARTRATE SCH MG: 25 TABLET, FILM COATED ORAL at 20:00

## 2017-02-03 RX ADMIN — ENOXAPARIN SODIUM SCH MG: 100 INJECTION SUBCUTANEOUS at 08:37

## 2017-02-03 RX ADMIN — DOCUSATE SODIUM SCH MG: 100 CAPSULE, LIQUID FILLED ORAL at 08:36

## 2017-02-03 RX ADMIN — FAMOTIDINE SCH MG: 20 TABLET ORAL at 20:08

## 2017-02-03 RX ADMIN — ASPIRIN 325 MG ORAL TABLET SCH MG: 325 PILL ORAL at 08:34

## 2017-02-03 RX ADMIN — SUCRALFATE SCH GM: 1 SUSPENSION ORAL at 12:13

## 2017-02-03 RX ADMIN — TAMSULOSIN HYDROCHLORIDE SCH MG: 0.4 CAPSULE ORAL at 20:07

## 2017-02-03 RX ADMIN — SUCRALFATE SCH GM: 1 SUSPENSION ORAL at 00:23

## 2017-02-03 NOTE — CONS
Date/Time of Note


Date/Time of Note


DATE: 2/3/17 


TIME: 11:41





Consult Date/Type/Reason


Admit Date/Time


Jan 24, 2017 at 13:35


Type of Consultation:  Infectious Disease





Subjective


feeling better





Objective


pulm-cta


mod assist transfer


 Vital Signs








  Date Time  Temp Pulse Resp B/P Pulse Ox O2 Delivery O2 Flow Rate FiO2


 


2/3/17 10:27  72 18 114/53 96 Room Air  





  74      


 


2/3/17 08:43 98.4       


 


2/2/17 20:01       2.0 














 Intake and Output   


 


 2/2/17 2/2/17 2/3/17





 15:00 23:00 07:00


 


Intake Total 480 ml  200 ml


 


Output Total 450 ml  400 ml


 


Balance 30 ml  -200 ml











Results/Medications


Result Diagram:  


2/1/17 0620                                                                    

            2/1/17 0620





Results 24 hrs





Laboratory Tests








Test


  2/2/17


11:58 2/2/17


17:05


 


Bedside Glucose 100   100  








Medications





 Current Medications


Trazodone HCl (Desyrel) 100 mg HS PO  Last administered on 2/2/17at 20:43; 

Admin Dose 100 MG;  Start 1/24/17 at 21:00


Tamsulosin HCl (Flomax) 0.4 mg HS PO  Last administered on 2/2/17at 20:43; 

Admin Dose 0.4 MG;  Start 1/24/17 at 21:00


Oxycodone/ Acetaminophen (Percocet (5/ 325)) 1 tab Q3H  PRN PO PAIN LEVEL 1-5 

Last administered on 1/27/17at 20:26; Admin Dose 1 TAB;  Start 1/24/17 at 15:00


Oxycodone/ Acetaminophen (Percocet (5/ 325)) 2 tab Q3H  PRN PO PAIN LEVEL 6-10 

Last administered on 2/3/17at 00:27; Admin Dose 2 TAB;  Start 1/24/17 at 15:00


Ondansetron HCl (Zofran Inj) 4 mg Q6H  PRN IV NAUSEA AND/OR VOMITING;  Start 1/ 24/17 at 15:00


Sucralfate (Carafate Susp) 1 gm Q6 PO  Last administered on 2/3/17at 06:30; 

Admin Dose 1 GM;  Start 1/24/17 at 18:00


Potassium Chloride (Klor-Con 20) 20 meq DAILY PO  Last administered on 1/28/ 17at 08:05; Admin Dose 20 MEQ;  Start 1/25/17 at 09:00


Multivitamins Therapeutic (Theragran) 1 tab DAILY PO  Last administered on 2/3/

17at 08:36; Admin Dose 1 TAB;  Start 1/25/17 at 09:00


Miscellaneous Information 1 ea NOTE XX ;  Start 1/24/17 at 15:00


Acetaminophen/ Hydrocodone Bitart (Norco (5/325)) 1 tab Q4H  PRN PO severe pain 

Last administered on 1/28/17at 08:12; Admin Dose 1 TAB;  Start 1/24/17 at 15:00


Guaifenesin (Robitussin Liquid Cup) 200 mg Q6 PO  Last administered on 2/2/17at 

06:20; Admin Dose 200 MG;  Start 1/24/17 at 18:00


Ketoconazole (Nizoral Cr) 1 applic BID TOP  Last administered on 2/3/17at 08:37

; Admin Dose 1 APPLIC;  Start 1/24/17 at 21:00


Atorvastatin Calcium (Lipitor) 80 mg HS PO  Last administered on 2/2/17at 20:43

; Admin Dose 80 MG;  Start 1/24/17 at 21:00


Fluoxetine HCl (Prozac) 40 mg DAILY PO  Last administered on 2/3/17at 08:35; 

Admin Dose 40 MG;  Start 1/25/17 at 09:00


Clonazepam 1 mg 1 mg BID PO  Last administered on 2/3/17at 08:36; Admin Dose 1 

MG;  Start 1/24/17 at 21:00


Albumin Human 250 ml @  500 mls/hr PRN  PRN IV CVP< 8, OR SBP<90;  Start 1/24/ 17 at 15:00


Acetaminophen (Tylenol Tab) 650 mg Q3H  PRN PO ELEVATED TEMPERATURE Last 

administered on 1/31/17at 18:14; Admin Dose 650 MG;  Start 1/24/17 at 15:00


Aspirin (Aspirin) 325 mg DAILY PO  Last administered on 2/3/17at 08:34; Admin 

Dose 325 MG;  Start 1/25/17 at 09:00


Enoxaparin Sodium (Lovenox) 40 mg DAILY SC  Last administered on 2/3/17at 08:37

; Admin Dose 40 MG;  Start 1/25/17 at 09:00


Clonazepam (Klonopin) 1 mg Q8H  PRN PO ANXIETY;  Start 1/24/17 at 15:00


Albuterol/ Ipratropium (Duoneb) 3 ml PRN  PRN HHN WHEEZING AND SOB;  Start 1/24/ 17 at 15:00


Famotidine (Pepcid) 20 mg BID PO  Last administered on 2/3/17at 08:35; Admin 

Dose 20 MG;  Start 1/24/17 at 21:00


Guaifenesin/ Codeine Phosphate (Robitussin Ac Liquid Cup) 10 ml Q4H  PRN PO 

COUGH Last administered on 2/3/17at 06:29; Admin Dose 10 ML;  Start 1/24/17 at 

15:00


Hydromorphone HCl (Dilaudid) 0.2 mg Q1H  PRN IV PAIN LEVEL 1-5;  Start 1/24/17 

at 15:00


Phenol (Cepastat Lozenge) 1 lozenge Q4  PRN PO dry mouth;  Start 1/26/17 at 09:

30


Magnesium Hydroxide (Milk Of Mag) 30 ml DAILY  PRN PO CONSTIPATION Last 

administered on 2/1/17at 18:12; Admin Dose 30 ML;  Start 1/27/17 at 08:30


Metoprolol Tartrate (Lopressor) 12.5 mg BID PO  Last administered on 2/3/17at 08

:36; Admin Dose 12.5 MG;  Start 1/27/17 at 10:00


Hydromorphone HCl (Dilaudid) 0.4 mg Q1H  PRN IM PAIN LEVEL 6-10 Last 

administered on 1/29/17at 17:58; Admin Dose 0.4 MG;  Start 1/28/17 at 11:00


Bisacodyl (Dulcolax Supp) 10 mg DAILY  PRN [place] CONSTIPATION Last administered on 

2/2/17at 16:44; Admin Dose 10 MG;  Start 1/28/17 at 15:00


Docusate Sodium (Colace) 100 mg BID PO  Last administered on 2/3/17at 08:36; 

Admin Dose 100 MG;  Start 1/28/17 at 21:00





Assessment/Plan


Additional Assessment/Plan


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  History of cervical radiculopathy and 

neuropathy.


   Patient feeling better. Continue treatment plan


 Status post respiratory failure.


Status post non-ST elevation myocardial infarction and coronary artery bypass 

graft.


Dysphagia, tolerating mechanical soft diet.


Fungal cellulitis.


Benign prostatic hypertrophy.


 History of anxiety and depression.











GABI MERCER MD Feb 3, 2017 11:43

## 2017-02-03 NOTE — PN
DATE:  02/03/2017

 

 

SUBJECTIVE:  The patient is stable, no acute events overnight.  No fevers, chills, nausea, vomiting,
 shortness of breath.

 

OBJECTIVE:

VITAL SIGNS:  Blood pressure is 114/53, respiration 18, pulse 74, temperature 98.4.

HEENT:  Head is normocephalic.

NECK:  Supple.

HEART:  Regular rate.

LUNGS:  Show diminished breath sounds at the base.

ABDOMEN:  Soft, nontender to palpation.  No rebound or guarding.

EXTREMITIES:  Negative for clubbing, cyanosis. No edema.

DERMATOLOGIC:  No rashes.

MUSCULOSKELETAL:  No joint effusions.

NEUROLOGIC:  No change in exam.

 

MEDICATIONS:  The patient's medications have been reviewed.

 

LABORATORY DATA:  Have been reviewed.  No new labs.

 

ASSESSMENT AND PLAN:

1.  Non-ST elevation myocardial infarction status post CABG on 01/06/2017.  The patient is clinicall
y stable.  Continue PT, OT.

2.  Acute diastolic heart failure.  The patient is clinically euvolemic.  Continue to monitor off di
uretics.

3.  Orthostatic hypotension, etiology is multifactorial secondary to autonomic dysfunction diuretics
.  Blood pressures have stabilized after holding diuretics.  Continue to monitor.

4.  IC myopathy, improving.  Continue physical therapy.

5.  Anemia.  Continue to monitor hemoglobin and hematocrit levels.

6.  Benign prostatic hypertrophy.  Continue Flomax.

7.  Neuropathy.  Continue physical therapy.  Continue Neurontin.

8.  Mild leukopenia.  Continue to monitor.

9.  Depression and anxiety disorder.  Continue Prozac and Klonopin.

10.  Status post pneumonia.

11.  Status post respiratory failure.

12.  Gastrointestinal and deep venous thrombosis prophylaxis.  Continue proton pump inhibitor and Lo
venox.

 

 

Dictated By: CHAVA VELOZ/DIMPLE

DD:    02/03/2017 10:54:10

DT:    02/03/2017 13:45:27

Conf#: [id number]

DID#:  [id number]

## 2017-02-04 VITALS — RESPIRATION RATE: 18 BRPM | SYSTOLIC BLOOD PRESSURE: 112 MMHG | DIASTOLIC BLOOD PRESSURE: 66 MMHG | HEART RATE: 72 BPM

## 2017-02-04 VITALS — DIASTOLIC BLOOD PRESSURE: 84 MMHG | HEART RATE: 83 BPM | SYSTOLIC BLOOD PRESSURE: 138 MMHG | RESPIRATION RATE: 20 BRPM

## 2017-02-04 RX ADMIN — METOPROLOL TARTRATE SCH MG: 25 TABLET, FILM COATED ORAL at 20:13

## 2017-02-04 RX ADMIN — SUCRALFATE SCH GM: 1 SUSPENSION ORAL at 12:30

## 2017-02-04 RX ADMIN — SUCRALFATE SCH GM: 1 SUSPENSION ORAL at 17:42

## 2017-02-04 RX ADMIN — GUAIFENESIN SCH MG: 100 SOLUTION ORAL at 00:00

## 2017-02-04 RX ADMIN — DOCUSATE SODIUM SCH MG: 100 CAPSULE, LIQUID FILLED ORAL at 20:12

## 2017-02-04 RX ADMIN — FAMOTIDINE SCH MG: 20 TABLET ORAL at 09:09

## 2017-02-04 RX ADMIN — FAMOTIDINE SCH MG: 20 TABLET ORAL at 20:12

## 2017-02-04 RX ADMIN — GUAIFENESIN SCH MG: 100 SOLUTION ORAL at 05:34

## 2017-02-04 RX ADMIN — METOPROLOL TARTRATE SCH MG: 25 TABLET, FILM COATED ORAL at 09:00

## 2017-02-04 RX ADMIN — ASPIRIN 325 MG ORAL TABLET SCH MG: 325 PILL ORAL at 09:09

## 2017-02-04 RX ADMIN — SUCRALFATE SCH GM: 1 SUSPENSION ORAL at 00:55

## 2017-02-04 RX ADMIN — DOCUSATE SODIUM SCH MG: 100 CAPSULE, LIQUID FILLED ORAL at 09:09

## 2017-02-04 RX ADMIN — GUAIFENESIN SCH MG: 100 SOLUTION ORAL at 12:00

## 2017-02-04 RX ADMIN — TAMSULOSIN HYDROCHLORIDE SCH MG: 0.4 CAPSULE ORAL at 20:12

## 2017-02-04 RX ADMIN — GUAIFENESIN SCH MG: 100 SOLUTION ORAL at 17:42

## 2017-02-04 RX ADMIN — ENOXAPARIN SODIUM SCH MG: 100 INJECTION SUBCUTANEOUS at 09:11

## 2017-02-04 RX ADMIN — ATORVASTATIN CALCIUM SCH MG: 80 TABLET, FILM COATED ORAL at 20:13

## 2017-02-04 RX ADMIN — THERA TABS SCH TAB: TAB at 09:08

## 2017-02-04 RX ADMIN — SUCRALFATE SCH GM: 1 SUSPENSION ORAL at 05:34

## 2017-02-04 RX ADMIN — FLUOXETINE SCH MG: 20 CAPSULE ORAL at 09:08

## 2017-02-04 NOTE — CONS
Date/Time of Note


Date/Time of Note


DATE: 2/4/17 


TIME: 08:55





Consult Date/Type/Reason


Admit Date/Time


Jan 24, 2017 at 13:35


Initial Consult Date





Type of Consultation:  med/nephro





Subjective


no new c/o.


d/w rn





Objective





 Vital Signs








  Date Time  Temp Pulse Resp B/P Pulse Ox O2 Delivery O2 Flow Rate FiO2


 


2/3/17 19:30 97.6 82 16 102/64 95 Room Air  


 


2/2/17 20:01       2.0 














 Intake and Output   


 


 2/3/17 2/3/17 2/4/17





 15:00 23:00 07:00


 


Intake Total  1240 ml 


 


Output Total  650 ml 


 


Balance  590 ml 











HEENT:  Head is normocephalic.


NECK:  Supple.


HEART:  Regular rate.


LUNGS:  Show diminished breath sounds at the base.


ABDOMEN:  Soft, nontender to palpation.  No rebound or guarding.


EXTREMITIES:  Negative for clubbing, cyanosis. No edema.


DERMATOLOGIC:  No rashes.


MUSCULOSKELETAL:  No joint effusions.


NEUROLOGIC:  No change in exam.





Results/Medications


Result Diagram:  


2/1/17 0620                                                                    

            2/1/17 0620





Medications





 Current Medications


Trazodone HCl (Desyrel) 100 mg HS PO  Last administered on 2/3/17at 20:08; 

Admin Dose 100 MG;  Start 1/24/17 at 21:00


Tamsulosin HCl (Flomax) 0.4 mg HS PO  Last administered on 2/3/17at 20:07; 

Admin Dose 0.4 MG;  Start 1/24/17 at 21:00


Oxycodone/ Acetaminophen (Percocet (5/ 325)) 1 tab Q3H  PRN PO PAIN LEVEL 1-5 

Last administered on 1/27/17at 20:26; Admin Dose 1 TAB;  Start 1/24/17 at 15:00


Oxycodone/ Acetaminophen (Percocet (5/ 325)) 2 tab Q3H  PRN PO PAIN LEVEL 6-10 

Last administered on 2/4/17at 05:34; Admin Dose 2 TAB;  Start 1/24/17 at 15:00


Ondansetron HCl (Zofran Inj) 4 mg Q6H  PRN IV NAUSEA AND/OR VOMITING;  Start 1/ 24/17 at 15:00


Sucralfate (Carafate Susp) 1 gm Q6 PO  Last administered on 2/4/17at 05:34; 

Admin Dose 1 GM;  Start 1/24/17 at 18:00


Potassium Chloride (Klor-Con 20) 20 meq DAILY PO  Last administered on 1/28/ 17at 08:05; Admin Dose 20 MEQ;  Start 1/25/17 at 09:00


Multivitamins Therapeutic (Theragran) 1 tab DAILY PO  Last administered on 2/3/

17at 08:36; Admin Dose 1 TAB;  Start 1/25/17 at 09:00


Miscellaneous Information 1 ea NOTE XX ;  Start 1/24/17 at 15:00


Acetaminophen/ Hydrocodone Bitart (Norco (5/325)) 1 tab Q4H  PRN PO severe pain 

Last administered on 1/28/17at 08:12; Admin Dose 1 TAB;  Start 1/24/17 at 15:00


Guaifenesin (Robitussin Liquid Cup) 200 mg Q6 PO  Last administered on 2/3/17at 

17:12; Admin Dose 200 MG;  Start 1/24/17 at 18:00


Ketoconazole (Nizoral Cr) 1 applic BID TOP  Last administered on 2/3/17at 20:10

; Admin Dose 1 APPLIC;  Start 1/24/17 at 21:00


Atorvastatin Calcium (Lipitor) 80 mg HS PO  Last administered on 2/3/17at 20:07

; Admin Dose 80 MG;  Start 1/24/17 at 21:00


Fluoxetine HCl (Prozac) 40 mg DAILY PO  Last administered on 2/3/17at 08:35; 

Admin Dose 40 MG;  Start 1/25/17 at 09:00


Clonazepam 1 mg 1 mg BID PO  Last administered on 2/3/17at 20:08; Admin Dose 1 

MG;  Start 1/24/17 at 21:00


Albumin Human 250 ml @  500 mls/hr PRN  PRN IV CVP< 8, OR SBP<90;  Start 1/24/ 17 at 15:00


Acetaminophen (Tylenol Tab) 650 mg Q3H  PRN PO ELEVATED TEMPERATURE Last 

administered on 1/31/17at 18:14; Admin Dose 650 MG;  Start 1/24/17 at 15:00


Aspirin (Aspirin) 325 mg DAILY PO  Last administered on 2/3/17at 08:34; Admin 

Dose 325 MG;  Start 1/25/17 at 09:00


Enoxaparin Sodium (Lovenox) 40 mg DAILY SC  Last administered on 2/3/17at 08:37

; Admin Dose 40 MG;  Start 1/25/17 at 09:00


Clonazepam (Klonopin) 1 mg Q8H  PRN PO ANXIETY;  Start 1/24/17 at 15:00


Albuterol/ Ipratropium (Duoneb) 3 ml PRN  PRN HHN WHEEZING AND SOB;  Start 1/24/ 17 at 15:00


Famotidine (Pepcid) 20 mg BID PO  Last administered on 2/3/17at 20:08; Admin 

Dose 20 MG;  Start 1/24/17 at 21:00


Guaifenesin/ Codeine Phosphate (Robitussin Ac Liquid Cup) 10 ml Q4H  PRN PO 

COUGH Last administered on 2/3/17at 20:07; Admin Dose 10 ML;  Start 1/24/17 at 

15:00


Hydromorphone HCl (Dilaudid) 0.2 mg Q1H  PRN IV PAIN LEVEL 1-5;  Start 1/24/17 

at 15:00


Phenol (Cepastat Lozenge) 1 lozenge Q4  PRN PO dry mouth;  Start 1/26/17 at 09:

30


Magnesium Hydroxide (Milk Of Mag) 30 ml DAILY  PRN PO CONSTIPATION Last 

administered on 2/1/17at 18:12; Admin Dose 30 ML;  Start 1/27/17 at 08:30


Metoprolol Tartrate (Lopressor) 12.5 mg BID PO  Last administered on 2/3/17at 08

:36; Admin Dose 12.5 MG;  Start 1/27/17 at 10:00


Hydromorphone HCl (Dilaudid) 0.4 mg Q1H  PRN IM PAIN LEVEL 6-10 Last 

administered on 1/29/17at 17:58; Admin Dose 0.4 MG;  Start 1/28/17 at 11:00


Bisacodyl (Dulcolax Supp) 10 mg DAILY  PRN [place] CONSTIPATION Last administered on 

2/2/17at 16:44; Admin Dose 10 MG;  Start 1/28/17 at 15:00


Docusate Sodium (Colace) 100 mg BID PO  Last administered on 2/3/17at 20:07; 

Admin Dose 100 MG;  Start 1/28/17 at 21:00





Assessment/Plan


Chief Complaint/Hosp Course


1.  Non-ST elevation myocardial infarction status post CABG on 01/06/2017.  The 

patient is clinically stable.  Continue PT, OT.


2.  Acute diastolic heart failure.  The patient is clinically euvolemic.  

Continue to monitor off diuretics.


3.  Orthostatic hypotension, etiology is multifactorial secondary to autonomic 

dysfunction diuretics.  Blood pressures have stabilized after holding 

diuretics.  Continue to monitor.


4.  IC myopathy, improving.  Continue physical therapy.


5.  Anemia.  Continue to monitor hemoglobin and hematocrit levels.


6.  Benign prostatic hypertrophy.  Continue Flomax.


7.  Neuropathy.  Continue physical therapy.  Continue Neurontin.


8.  Mild leukopenia.  Continue to monitor.


9.  Depression and anxiety disorder.  Continue Prozac and Klonopin.


10.  Status post pneumonia.


11.  Status post respiratory failure.


12.  Gastrointestinal and deep venous thrombosis prophylaxis.  Continue proton 

pump inhibitor and Lovenox.


Problems:  











MICHELLE MARTIN MD Feb 4, 2017 08:56

## 2017-02-04 NOTE — CONS
Date/Time of Note


Date/Time of Note


DATE: 2/4/17 


TIME: 07:44





Consult Date/Type/Reason


Admit Date/Time


Jan 24, 2017 at 13:35


Type of Consultation:  Infectious Disease





Subjective


resting comfortably





Objective


pulm-cta


 Vital Signs








  Date Time  Temp Pulse Resp B/P Pulse Ox O2 Delivery O2 Flow Rate FiO2


 


2/3/17 19:30 97.6 82 16 102/64 95 Room Air  


 


2/2/17 20:01       2.0 














 Intake and Output   


 


 2/3/17 2/3/17 2/4/17





 15:00 23:00 07:00


 


Intake Total  1240 ml 


 


Output Total  650 ml 


 


Balance  590 ml 











Results/Medications


Result Diagram:  


2/1/17 0620                                                                    

            2/1/17 0620





Medications





 Current Medications


Trazodone HCl (Desyrel) 100 mg HS PO  Last administered on 2/3/17at 20:08; 

Admin Dose 100 MG;  Start 1/24/17 at 21:00


Tamsulosin HCl (Flomax) 0.4 mg HS PO  Last administered on 2/3/17at 20:07; 

Admin Dose 0.4 MG;  Start 1/24/17 at 21:00


Oxycodone/ Acetaminophen (Percocet (5/ 325)) 1 tab Q3H  PRN PO PAIN LEVEL 1-5 

Last administered on 1/27/17at 20:26; Admin Dose 1 TAB;  Start 1/24/17 at 15:00


Oxycodone/ Acetaminophen (Percocet (5/ 325)) 2 tab Q3H  PRN PO PAIN LEVEL 6-10 

Last administered on 2/4/17at 05:34; Admin Dose 2 TAB;  Start 1/24/17 at 15:00


Ondansetron HCl (Zofran Inj) 4 mg Q6H  PRN IV NAUSEA AND/OR VOMITING;  Start 1/ 24/17 at 15:00


Sucralfate (Carafate Susp) 1 gm Q6 PO  Last administered on 2/4/17at 05:34; 

Admin Dose 1 GM;  Start 1/24/17 at 18:00


Potassium Chloride (Klor-Con 20) 20 meq DAILY PO  Last administered on 1/28/ 17at 08:05; Admin Dose 20 MEQ;  Start 1/25/17 at 09:00


Multivitamins Therapeutic (Theragran) 1 tab DAILY PO  Last administered on 2/3/

17at 08:36; Admin Dose 1 TAB;  Start 1/25/17 at 09:00


Miscellaneous Information 1 ea NOTE XX ;  Start 1/24/17 at 15:00


Acetaminophen/ Hydrocodone Bitart (Norco (5/325)) 1 tab Q4H  PRN PO severe pain 

Last administered on 1/28/17at 08:12; Admin Dose 1 TAB;  Start 1/24/17 at 15:00


Guaifenesin (Robitussin Liquid Cup) 200 mg Q6 PO  Last administered on 2/3/17at 

17:12; Admin Dose 200 MG;  Start 1/24/17 at 18:00


Ketoconazole (Nizoral Cr) 1 applic BID TOP  Last administered on 2/3/17at 20:10

; Admin Dose 1 APPLIC;  Start 1/24/17 at 21:00


Atorvastatin Calcium (Lipitor) 80 mg HS PO  Last administered on 2/3/17at 20:07

; Admin Dose 80 MG;  Start 1/24/17 at 21:00


Fluoxetine HCl (Prozac) 40 mg DAILY PO  Last administered on 2/3/17at 08:35; 

Admin Dose 40 MG;  Start 1/25/17 at 09:00


Clonazepam 1 mg 1 mg BID PO  Last administered on 2/3/17at 20:08; Admin Dose 1 

MG;  Start 1/24/17 at 21:00


Albumin Human 250 ml @  500 mls/hr PRN  PRN IV CVP< 8, OR SBP<90;  Start 1/24/ 17 at 15:00


Acetaminophen (Tylenol Tab) 650 mg Q3H  PRN PO ELEVATED TEMPERATURE Last 

administered on 1/31/17at 18:14; Admin Dose 650 MG;  Start 1/24/17 at 15:00


Aspirin (Aspirin) 325 mg DAILY PO  Last administered on 2/3/17at 08:34; Admin 

Dose 325 MG;  Start 1/25/17 at 09:00


Enoxaparin Sodium (Lovenox) 40 mg DAILY SC  Last administered on 2/3/17at 08:37

; Admin Dose 40 MG;  Start 1/25/17 at 09:00


Clonazepam (Klonopin) 1 mg Q8H  PRN PO ANXIETY;  Start 1/24/17 at 15:00


Albuterol/ Ipratropium (Duoneb) 3 ml PRN  PRN HHN WHEEZING AND SOB;  Start 1/24/ 17 at 15:00


Famotidine (Pepcid) 20 mg BID PO  Last administered on 2/3/17at 20:08; Admin 

Dose 20 MG;  Start 1/24/17 at 21:00


Guaifenesin/ Codeine Phosphate (Robitussin Ac Liquid Cup) 10 ml Q4H  PRN PO 

COUGH Last administered on 2/3/17at 20:07; Admin Dose 10 ML;  Start 1/24/17 at 

15:00


Hydromorphone HCl (Dilaudid) 0.2 mg Q1H  PRN IV PAIN LEVEL 1-5;  Start 1/24/17 

at 15:00


Phenol (Cepastat Lozenge) 1 lozenge Q4  PRN PO dry mouth;  Start 1/26/17 at 09:

30


Magnesium Hydroxide (Milk Of Mag) 30 ml DAILY  PRN PO CONSTIPATION Last 

administered on 2/1/17at 18:12; Admin Dose 30 ML;  Start 1/27/17 at 08:30


Metoprolol Tartrate (Lopressor) 12.5 mg BID PO  Last administered on 2/3/17at 08

:36; Admin Dose 12.5 MG;  Start 1/27/17 at 10:00


Hydromorphone HCl (Dilaudid) 0.4 mg Q1H  PRN IM PAIN LEVEL 6-10 Last 

administered on 1/29/17at 17:58; Admin Dose 0.4 MG;  Start 1/28/17 at 11:00


Bisacodyl (Dulcolax Supp) 10 mg DAILY  PRN [place] CONSTIPATION Last administered on 

2/2/17at 16:44; Admin Dose 10 MG;  Start 1/28/17 at 15:00


Docusate Sodium (Colace) 100 mg BID PO  Last administered on 2/3/17at 20:07; 

Admin Dose 100 MG;  Start 1/28/17 at 21:00





Assessment/Plan


Additional Assessment/Plan


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  History of cervical radiculopathy and 

neuropathy.


   Continue rehab treatment plan


 Status post respiratory failure.


Status post non-ST elevation myocardial infarction and coronary artery bypass 

graft.


Dysphagia, tolerating mechanical soft diet.


Fungal cellulitis.


Benign prostatic hypertrophy.


 History of anxiety and depression.











GABI MERCER MD Feb 4, 2017 07:44

## 2017-02-05 VITALS — DIASTOLIC BLOOD PRESSURE: 71 MMHG | RESPIRATION RATE: 21 BRPM | SYSTOLIC BLOOD PRESSURE: 138 MMHG

## 2017-02-05 VITALS — HEART RATE: 72 BPM | SYSTOLIC BLOOD PRESSURE: 117 MMHG | RESPIRATION RATE: 18 BRPM | DIASTOLIC BLOOD PRESSURE: 60 MMHG

## 2017-02-05 RX ADMIN — METOPROLOL TARTRATE SCH MG: 25 TABLET, FILM COATED ORAL at 20:11

## 2017-02-05 RX ADMIN — THERA TABS SCH TAB: TAB at 09:04

## 2017-02-05 RX ADMIN — ATORVASTATIN CALCIUM SCH MG: 80 TABLET, FILM COATED ORAL at 20:10

## 2017-02-05 RX ADMIN — SUCRALFATE SCH GM: 1 SUSPENSION ORAL at 17:55

## 2017-02-05 RX ADMIN — FAMOTIDINE SCH MG: 20 TABLET ORAL at 09:04

## 2017-02-05 RX ADMIN — DOCUSATE SODIUM SCH MG: 100 CAPSULE, LIQUID FILLED ORAL at 09:04

## 2017-02-05 RX ADMIN — SUCRALFATE SCH GM: 1 SUSPENSION ORAL at 12:26

## 2017-02-05 RX ADMIN — ASPIRIN 325 MG ORAL TABLET SCH MG: 325 PILL ORAL at 09:04

## 2017-02-05 RX ADMIN — FLUOXETINE SCH MG: 20 CAPSULE ORAL at 09:03

## 2017-02-05 RX ADMIN — DOCUSATE SODIUM SCH MG: 100 CAPSULE, LIQUID FILLED ORAL at 20:10

## 2017-02-05 RX ADMIN — SUCRALFATE SCH GM: 1 SUSPENSION ORAL at 06:00

## 2017-02-05 RX ADMIN — SUCRALFATE SCH GM: 1 SUSPENSION ORAL at 00:00

## 2017-02-05 RX ADMIN — GUAIFENESIN SCH MG: 100 SOLUTION ORAL at 06:00

## 2017-02-05 RX ADMIN — TAMSULOSIN HYDROCHLORIDE SCH MG: 0.4 CAPSULE ORAL at 20:10

## 2017-02-05 RX ADMIN — GUAIFENESIN SCH MG: 100 SOLUTION ORAL at 00:00

## 2017-02-05 RX ADMIN — ENOXAPARIN SODIUM SCH MG: 100 INJECTION SUBCUTANEOUS at 09:06

## 2017-02-05 RX ADMIN — METOPROLOL TARTRATE SCH MG: 25 TABLET, FILM COATED ORAL at 09:00

## 2017-02-05 RX ADMIN — GUAIFENESIN SCH MG: 100 SOLUTION ORAL at 12:00

## 2017-02-05 RX ADMIN — FAMOTIDINE SCH MG: 20 TABLET ORAL at 20:09

## 2017-02-05 RX ADMIN — GUAIFENESIN SCH MG: 100 SOLUTION ORAL at 17:55

## 2017-02-05 NOTE — CONS
Date/Time of Note


Date/Time of Note


DATE: 2/5/17 


TIME: 09:12





Consult Date/Type/Reason


Admit Date/Time


Jan 24, 2017 at 13:35


Type of Consultation:  med/nephro





Subjective


Pt. seen and examined.


Tolerating PT


no new c/o.


d/w rn at bedside.





Objective





 Vital Signs








  Date Time  Temp Pulse Resp B/P Pulse Ox O2 Delivery O2 Flow Rate FiO2


 


2/5/17 08:00 97.7 72 18 117/60 96 Room Air  


 


2/2/17 20:01       2.0 














 Intake and Output   


 


 2/4/17 2/4/17 2/5/17





 15:00 23:00 07:00


 


Intake Total  620 ml 


 


Balance  620 ml 











HEENT:  Head is normocephalic.


NECK:  Supple.


HEART:  Regular rate.


LUNGS:  Show diminished breath sounds at the base.


ABDOMEN:  Soft, nontender to palpation.  No rebound or guarding.


EXTREMITIES:  Negative for clubbing, cyanosis. No edema.


DERMATOLOGIC:  No rashes.


MUSCULOSKELETAL:  No joint effusions.


NEUROLOGIC:  No change in exam.





Results/Medications


Result Diagram:  


2/1/17 0620 2/1/17 0620





Medications





 Current Medications


Trazodone HCl (Desyrel) 100 mg HS PO  Last administered on 2/4/17at 20:12; 

Admin Dose 100 MG;  Start 1/24/17 at 21:00


Tamsulosin HCl (Flomax) 0.4 mg HS PO  Last administered on 2/4/17at 20:12; 

Admin Dose 0.4 MG;  Start 1/24/17 at 21:00


Oxycodone/ Acetaminophen (Percocet (5/ 325)) 1 tab Q3H  PRN PO PAIN LEVEL 1-5 

Last administered on 1/27/17at 20:26; Admin Dose 1 TAB;  Start 1/24/17 at 15:00


Oxycodone/ Acetaminophen (Percocet (5/ 325)) 2 tab Q3H  PRN PO PAIN LEVEL 6-10 

Last administered on 2/5/17at 04:25; Admin Dose 2 TAB;  Start 1/24/17 at 15:00


Ondansetron HCl (Zofran Inj) 4 mg Q6H  PRN IV NAUSEA AND/OR VOMITING;  Start 1/ 24/17 at 15:00


Sucralfate (Carafate Susp) 1 gm Q6 PO  Last administered on 2/4/17at 17:42; 

Admin Dose 1 GM;  Start 1/24/17 at 18:00


Potassium Chloride (Klor-Con 20) 20 meq DAILY PO  Last administered on 1/28/ 17at 08:05; Admin Dose 20 MEQ;  Start 1/25/17 at 09:00


Multivitamins Therapeutic (Theragran) 1 tab DAILY PO  Last administered on 2/5/ 17at 09:04; Admin Dose 1 TAB;  Start 1/25/17 at 09:00


Miscellaneous Information 1 ea NOTE XX ;  Start 1/24/17 at 15:00


Acetaminophen/ Hydrocodone Bitart (Norco (5/325)) 1 tab Q4H  PRN PO severe pain 

Last administered on 1/28/17at 08:12; Admin Dose 1 TAB;  Start 1/24/17 at 15:00


Guaifenesin (Robitussin Liquid Cup) 200 mg Q6 PO  Last administered on 2/4/17at 

17:42; Admin Dose 200 MG;  Start 1/24/17 at 18:00


Ketoconazole (Nizoral Cr) 1 applic BID TOP  Last administered on 2/5/17at 09:06

; Admin Dose 1 APPLIC;  Start 1/24/17 at 21:00


Atorvastatin Calcium (Lipitor) 80 mg HS PO  Last administered on 2/4/17at 20:13

; Admin Dose 80 MG;  Start 1/24/17 at 21:00


Fluoxetine HCl (Prozac) 40 mg DAILY PO  Last administered on 2/5/17at 09:03; 

Admin Dose 40 MG;  Start 1/25/17 at 09:00


Clonazepam 1 mg 1 mg BID PO  Last administered on 2/5/17at 09:04; Admin Dose 1 

MG;  Start 1/24/17 at 21:00


Albumin Human 250 ml @  500 mls/hr PRN  PRN IV CVP< 8, OR SBP<90;  Start 1/24/ 17 at 15:00


Acetaminophen (Tylenol Tab) 650 mg Q3H  PRN PO ELEVATED TEMPERATURE Last 

administered on 1/31/17at 18:14; Admin Dose 650 MG;  Start 1/24/17 at 15:00


Aspirin (Aspirin) 325 mg DAILY PO  Last administered on 2/5/17at 09:04; Admin 

Dose 325 MG;  Start 1/25/17 at 09:00


Enoxaparin Sodium (Lovenox) 40 mg DAILY SC  Last administered on 2/5/17at 09:06

; Admin Dose 40 MG;  Start 1/25/17 at 09:00


Clonazepam (Klonopin) 1 mg Q8H  PRN PO ANXIETY;  Start 1/24/17 at 15:00


Albuterol/ Ipratropium (Duoneb) 3 ml PRN  PRN HHN WHEEZING AND SOB;  Start 1/24/ 17 at 15:00


Famotidine (Pepcid) 20 mg BID PO  Last administered on 2/5/17at 09:04; Admin 

Dose 20 MG;  Start 1/24/17 at 21:00


Guaifenesin/ Codeine Phosphate (Robitussin Ac Liquid Cup) 10 ml Q4H  PRN PO 

COUGH Last administered on 2/5/17at 09:03; Admin Dose 10 ML;  Start 1/24/17 at 

15:00


Hydromorphone HCl (Dilaudid) 0.2 mg Q1H  PRN IV PAIN LEVEL 1-5;  Start 1/24/17 

at 15:00


Phenol (Cepastat Lozenge) 1 lozenge Q4  PRN PO dry mouth;  Start 1/26/17 at 09:

30


Magnesium Hydroxide (Milk Of Mag) 30 ml DAILY  PRN PO CONSTIPATION Last 

administered on 2/1/17at 18:12; Admin Dose 30 ML;  Start 1/27/17 at 08:30


Metoprolol Tartrate (Lopressor) 12.5 mg BID PO  Last administered on 2/4/17at 20

:13; Admin Dose 12.5 MG;  Start 1/27/17 at 10:00


Hydromorphone HCl (Dilaudid) 0.4 mg Q1H  PRN IM PAIN LEVEL 6-10 Last 

administered on 1/29/17at 17:58; Admin Dose 0.4 MG;  Start 1/28/17 at 11:00


Bisacodyl (Dulcolax Supp) 10 mg DAILY  PRN [place] CONSTIPATION Last administered on 

2/2/17at 16:44; Admin Dose 10 MG;  Start 1/28/17 at 15:00


Docusate Sodium (Colace) 100 mg BID PO  Last administered on 2/5/17at 09:04; 

Admin Dose 100 MG;  Start 1/28/17 at 21:00





Assessment/Plan


Chief Complaint/Hosp Course


1.  Non-ST elevation myocardial infarction status post CABG on 01/06/2017.  The 

patient is clinically stable.  Continue PT, OT.


2.  Acute diastolic heart failure.  The patient is clinically euvolemic.  

Continue to monitor off diuretics.


3.  Orthostatic hypotension, etiology is multifactorial secondary to autonomic 

dysfunction diuretics.  Blood pressures have stabilized after holding 

diuretics.  Continue to monitor.


4.  IC myopathy, improving.  Continue physical therapy.


5.  Anemia.  Continue to monitor hemoglobin and hematocrit levels.


6.  Benign prostatic hypertrophy.  Continue Flomax.


7.  Neuropathy.  Continue physical therapy.  Continue Neurontin.


8.  Mild leukopenia.  Continue to monitor.


9.  Depression and anxiety disorder.  Continue Prozac and Klonopin.


10.  Status post pneumonia.


11.  Status post respiratory failure.


12.  Gastrointestinal and deep venous thrombosis prophylaxis.  Continue proton 

pump inhibitor and Lovenox.


Problems:  











MICHELLE MARTIN MD Feb 5, 2017 09:13

## 2017-02-06 VITALS — RESPIRATION RATE: 18 BRPM | SYSTOLIC BLOOD PRESSURE: 136 MMHG | DIASTOLIC BLOOD PRESSURE: 65 MMHG

## 2017-02-06 VITALS — RESPIRATION RATE: 18 BRPM | SYSTOLIC BLOOD PRESSURE: 116 MMHG | DIASTOLIC BLOOD PRESSURE: 65 MMHG | HEART RATE: 82 BPM

## 2017-02-06 VITALS — RESPIRATION RATE: 18 BRPM | SYSTOLIC BLOOD PRESSURE: 119 MMHG | DIASTOLIC BLOOD PRESSURE: 65 MMHG

## 2017-02-06 RX ADMIN — TAMSULOSIN HYDROCHLORIDE SCH MG: 0.4 CAPSULE ORAL at 21:14

## 2017-02-06 RX ADMIN — FLUOXETINE SCH MG: 20 CAPSULE ORAL at 08:38

## 2017-02-06 RX ADMIN — SUCRALFATE SCH GM: 1 SUSPENSION ORAL at 05:18

## 2017-02-06 RX ADMIN — FAMOTIDINE SCH MG: 20 TABLET ORAL at 08:39

## 2017-02-06 RX ADMIN — FAMOTIDINE SCH MG: 20 TABLET ORAL at 21:14

## 2017-02-06 RX ADMIN — GUAIFENESIN SCH MG: 100 SOLUTION ORAL at 12:00

## 2017-02-06 RX ADMIN — GUAIFENESIN SCH MG: 100 SOLUTION ORAL at 05:22

## 2017-02-06 RX ADMIN — SUCRALFATE SCH GM: 1 SUSPENSION ORAL at 12:36

## 2017-02-06 RX ADMIN — SUCRALFATE SCH GM: 1 SUSPENSION ORAL at 00:00

## 2017-02-06 RX ADMIN — ENOXAPARIN SODIUM SCH MG: 100 INJECTION SUBCUTANEOUS at 08:44

## 2017-02-06 RX ADMIN — DOCUSATE SODIUM SCH MG: 100 CAPSULE, LIQUID FILLED ORAL at 08:38

## 2017-02-06 RX ADMIN — GUAIFENESIN SCH MG: 100 SOLUTION ORAL at 00:00

## 2017-02-06 RX ADMIN — METOPROLOL TARTRATE SCH MG: 25 TABLET, FILM COATED ORAL at 21:15

## 2017-02-06 RX ADMIN — THERA TABS SCH TAB: TAB at 08:39

## 2017-02-06 RX ADMIN — SUCRALFATE SCH GM: 1 SUSPENSION ORAL at 18:23

## 2017-02-06 RX ADMIN — METOPROLOL TARTRATE SCH MG: 25 TABLET, FILM COATED ORAL at 08:40

## 2017-02-06 RX ADMIN — GUAIFENESIN SCH MG: 100 SOLUTION ORAL at 18:00

## 2017-02-06 RX ADMIN — ASPIRIN 325 MG ORAL TABLET SCH MG: 325 PILL ORAL at 08:38

## 2017-02-06 RX ADMIN — BISACODYL PRN MG: 10 SUPPOSITORY RECTAL at 05:18

## 2017-02-06 RX ADMIN — ATORVASTATIN CALCIUM SCH MG: 80 TABLET, FILM COATED ORAL at 21:14

## 2017-02-06 RX ADMIN — DOCUSATE SODIUM SCH MG: 100 CAPSULE, LIQUID FILLED ORAL at 21:14

## 2017-02-06 NOTE — PN
DATE:  

 

 

SUBJECTIVE:  The patient is stable, no acute events overnight.  No fevers, chills, nausea, vomiting,
 no shortness of breath.

 

OBJECTIVE:

VITAL SIGNS:  Blood pressure 130/71, respirations 21, pulse 80, temperature 98.0.

HEENT:  Head is normocephalic.

NECK:  Supple.

HEART:  Regular rate.

LUNGS:  Show diminished breath sounds at the base.

ABDOMEN:  Soft, nontender to palpation.  No rebound or guarding.

EXTREMITIES:  Negative for clubbing, cyanosis, no edema.

DERMATOLOGIC:  No rashes.

MUSCULOSKELETAL:  No joint effusions.

NEUROLOGIC:  No change in exam.

 

MEDICATIONS:  Have been reviewed.

 

LABORATORY DATA:  Has been reviewed.  No new labs.

 

ASSESSMENT AND PLAN:

1.  Non-ST elevation myocardial infarction.  The patient is status post coronary artery bypass graft
ing on 01/06/2016.  The patient is clinically stable.  Continue PT, OT.

2.  Acute diastolic heart failure.  The patient is euvolemic.  Continue to monitor off diuretic ther
apy.

3.  Orthostatic hypertension, resolved.  Continue to monitor.  Continue abdominal binders as aryan
morgan stockings.

4.  ICU myopathy.  The patient is improving.  Continue physical therapy.

5.  Anemia.  Continue to monitor hemoglobin and hematocrit levels.

6.  Benign prostatic hypertrophy.  Continue Flomax.

7.  Neuropathy.  Continue Neurontin.

8.  Mild leukopenia.  Continue to monitor.

9.  Depression and anxiety disorder. Continue Prozac and Klonopin. 

10.  Status post pneumonia.

11.  Status post respiratory failure.

12.  Gastrointestinal and deep venous thrombosis prophylaxis.  Continue proton pump inhibitor and Lo
venox.

 

 

Dictated By: CHAVA VELOZ/DIMPLE

DD:    02/06/2017 09:13:02

DT:    02/06/2017 10:46:07

Conf#: [id number]

DID#:  [id number]

## 2017-02-06 NOTE — CONS
Date/Time of Note


Date/Time of Note


DATE: 2/6/17 


TIME: 13:22





Consult Date/Type/Reason


Admit Date/Time


Jan 24, 2017 at 13:35


Type of Consultation:  med/nephro





Subjective


Overall improved





Objective





 Vital Signs








  Date Time  Temp Pulse Resp B/P Pulse Ox O2 Delivery O2 Flow Rate FiO2


 


2/6/17 07:30 97.9 75 18 119/65 94   


 


2/5/17 08:00      Room Air  


 


2/2/17 20:01       2.0 














 Intake and Output   


 


 2/5/17 2/5/17 2/6/17





 15:00 23:00 07:00


 


Intake Total 360 ml 460 ml 


 


Output Total  350 ml 


 


Balance 360 ml 110 ml 





INTERDISCIPLINARY TEAM CONFERENCE





BOWEL- Cont


BLADDER-Condom cath


SKIN- intact











OT-     DRESSING-min/mod


   BATHING-min/mod


   TOILETING-min/mod





PT-      BED MOBILITY-min


   TRANSFERS-min


   AMBULATION-min 60 feet


   








A/P-


Interdisciplinary team conference held today. Please see interdisciplinary 

sheet.  Working toward d.c. on 2/8 with post discharge follow up of physical 

therapy, occupational therapy.





Results/Medications


Medications





 Current Medications


Trazodone HCl (Desyrel) 100 mg HS PO  Last administered on 2/5/17at 20:10; 

Admin Dose 100 MG;  Start 1/24/17 at 21:00


Tamsulosin HCl (Flomax) 0.4 mg HS PO  Last administered on 2/5/17at 20:10; 

Admin Dose 0.4 MG;  Start 1/24/17 at 21:00


Oxycodone/ Acetaminophen (Percocet (5/ 325)) 1 tab Q3H  PRN PO PAIN LEVEL 1-5 

Last administered on 1/27/17at 20:26; Admin Dose 1 TAB;  Start 1/24/17 at 15:00


Oxycodone/ Acetaminophen (Percocet (5/ 325)) 2 tab Q3H  PRN PO PAIN LEVEL 6-10 

Last administered on 2/5/17at 20:11; Admin Dose 2 TAB;  Start 1/24/17 at 15:00


Ondansetron HCl (Zofran Inj) 4 mg Q6H  PRN IV NAUSEA AND/OR VOMITING;  Start 1/ 24/17 at 15:00


Sucralfate (Carafate Susp) 1 gm Q6 PO  Last administered on 2/6/17at 12:36; 

Admin Dose 1 GM;  Start 1/24/17 at 18:00


Potassium Chloride (Klor-Con 20) 20 meq DAILY PO  Last administered on 1/28/ 17at 08:05; Admin Dose 20 MEQ;  Start 1/25/17 at 09:00


Multivitamins Therapeutic (Theragran) 1 tab DAILY PO  Last administered on 2/6/ 17at 08:39; Admin Dose 1 TAB;  Start 1/25/17 at 09:00


Miscellaneous Information 1 ea NOTE XX ;  Start 1/24/17 at 15:00


Acetaminophen/ Hydrocodone Bitart (Norco (5/325)) 1 tab Q4H  PRN PO severe pain 

Last administered on 1/28/17at 08:12; Admin Dose 1 TAB;  Start 1/24/17 at 15:00


Guaifenesin (Robitussin Liquid Cup) 200 mg Q6 PO  Last administered on 2/5/17at 

17:55; Admin Dose 200 MG;  Start 1/24/17 at 18:00


Ketoconazole (Nizoral Cr) 1 applic BID TOP  Last administered on 2/6/17at 08:40

; Admin Dose 1 APPLIC;  Start 1/24/17 at 21:00


Atorvastatin Calcium (Lipitor) 80 mg HS PO  Last administered on 2/5/17at 20:10

; Admin Dose 80 MG;  Start 1/24/17 at 21:00


Fluoxetine HCl (Prozac) 40 mg DAILY PO  Last administered on 2/6/17at 08:38; 

Admin Dose 40 MG;  Start 1/25/17 at 09:00


Clonazepam 1 mg 1 mg BID PO  Last administered on 2/6/17at 08:39; Admin Dose 1 

MG;  Start 1/24/17 at 21:00


Albumin Human 250 ml @  500 mls/hr PRN  PRN IV CVP< 8, OR SBP<90;  Start 1/24/ 17 at 15:00


Acetaminophen (Tylenol Tab) 650 mg Q3H  PRN PO ELEVATED TEMPERATURE Last 

administered on 1/31/17at 18:14; Admin Dose 650 MG;  Start 1/24/17 at 15:00


Aspirin (Aspirin) 325 mg DAILY PO  Last administered on 2/6/17at 08:38; Admin 

Dose 325 MG;  Start 1/25/17 at 09:00


Enoxaparin Sodium (Lovenox) 40 mg DAILY SC  Last administered on 2/6/17at 08:44

; Admin Dose 40 MG;  Start 1/25/17 at 09:00


Clonazepam (Klonopin) 1 mg Q8H  PRN PO ANXIETY;  Start 1/24/17 at 15:00


Albuterol/ Ipratropium (Duoneb) 3 ml PRN  PRN HHN WHEEZING AND SOB;  Start 1/24/ 17 at 15:00


Famotidine (Pepcid) 20 mg BID PO  Last administered on 2/6/17at 08:39; Admin 

Dose 20 MG;  Start 1/24/17 at 21:00


Guaifenesin/ Codeine Phosphate (Robitussin Ac Liquid Cup) 10 ml Q4H  PRN PO 

COUGH Last administered on 2/6/17at 08:48; Admin Dose 10 ML;  Start 1/24/17 at 

15:00


Hydromorphone HCl (Dilaudid) 0.2 mg Q1H  PRN IV PAIN LEVEL 1-5;  Start 1/24/17 

at 15:00


Phenol (Cepastat Lozenge) 1 lozenge Q4  PRN PO dry mouth;  Start 1/26/17 at 09:

30


Magnesium Hydroxide (Milk Of Mag) 30 ml DAILY  PRN PO CONSTIPATION Last 

administered on 2/1/17at 18:12; Admin Dose 30 ML;  Start 1/27/17 at 08:30


Metoprolol Tartrate (Lopressor) 12.5 mg BID PO  Last administered on 2/5/17at 20

:11; Admin Dose 12.5 MG;  Start 1/27/17 at 10:00


Hydromorphone HCl (Dilaudid) 0.4 mg Q1H  PRN IM PAIN LEVEL 6-10 Last 

administered on 1/29/17at 17:58; Admin Dose 0.4 MG;  Start 1/28/17 at 11:00


Bisacodyl (Dulcolax Supp) 10 mg DAILY  PRN PA CONSTIPATION Last administered on 

2/6/17at 05:18; Admin Dose 10 MG;  Start 1/28/17 at 15:00


Docusate Sodium (Colace) 100 mg BID PO  Last administered on 2/6/17at 08:38; 

Admin Dose 100 MG;  Start 1/28/17 at 21:00











GABI MERCER MD Feb 6, 2017 13:22


GABI MERCER MD Feb 6, 2017 13:22

## 2017-02-07 VITALS — SYSTOLIC BLOOD PRESSURE: 125 MMHG | RESPIRATION RATE: 19 BRPM | DIASTOLIC BLOOD PRESSURE: 63 MMHG

## 2017-02-07 VITALS — HEART RATE: 77 BPM | SYSTOLIC BLOOD PRESSURE: 119 MMHG | DIASTOLIC BLOOD PRESSURE: 56 MMHG | RESPIRATION RATE: 19 BRPM

## 2017-02-07 VITALS — RESPIRATION RATE: 18 BRPM | SYSTOLIC BLOOD PRESSURE: 140 MMHG | DIASTOLIC BLOOD PRESSURE: 65 MMHG

## 2017-02-07 RX ADMIN — GUAIFENESIN SCH MG: 100 SOLUTION ORAL at 00:00

## 2017-02-07 RX ADMIN — GUAIFENESIN SCH MG: 100 SOLUTION ORAL at 12:00

## 2017-02-07 RX ADMIN — FAMOTIDINE SCH MG: 20 TABLET ORAL at 20:39

## 2017-02-07 RX ADMIN — ASPIRIN 325 MG ORAL TABLET SCH MG: 325 PILL ORAL at 09:20

## 2017-02-07 RX ADMIN — FLUOXETINE SCH MG: 20 CAPSULE ORAL at 09:21

## 2017-02-07 RX ADMIN — TAMSULOSIN HYDROCHLORIDE SCH MG: 0.4 CAPSULE ORAL at 20:40

## 2017-02-07 RX ADMIN — ATORVASTATIN CALCIUM SCH MG: 80 TABLET, FILM COATED ORAL at 20:39

## 2017-02-07 RX ADMIN — THERA TABS SCH TAB: TAB at 09:21

## 2017-02-07 RX ADMIN — ENOXAPARIN SODIUM SCH MG: 100 INJECTION SUBCUTANEOUS at 09:30

## 2017-02-07 RX ADMIN — SUCRALFATE SCH GM: 1 SUSPENSION ORAL at 00:00

## 2017-02-07 RX ADMIN — SUCRALFATE SCH GM: 1 SUSPENSION ORAL at 12:07

## 2017-02-07 RX ADMIN — METOPROLOL TARTRATE SCH MG: 25 TABLET, FILM COATED ORAL at 20:45

## 2017-02-07 RX ADMIN — SUCRALFATE SCH GM: 1 SUSPENSION ORAL at 18:09

## 2017-02-07 RX ADMIN — GUAIFENESIN SCH MG: 100 SOLUTION ORAL at 18:00

## 2017-02-07 RX ADMIN — DOCUSATE SODIUM SCH MG: 100 CAPSULE, LIQUID FILLED ORAL at 20:39

## 2017-02-07 RX ADMIN — DOCUSATE SODIUM SCH MG: 100 CAPSULE, LIQUID FILLED ORAL at 09:20

## 2017-02-07 RX ADMIN — METOPROLOL TARTRATE SCH MG: 25 TABLET, FILM COATED ORAL at 09:21

## 2017-02-07 RX ADMIN — GUAIFENESIN SCH MG: 100 SOLUTION ORAL at 06:40

## 2017-02-07 RX ADMIN — BISACODYL PRN MG: 10 SUPPOSITORY RECTAL at 06:51

## 2017-02-07 RX ADMIN — FAMOTIDINE SCH MG: 20 TABLET ORAL at 09:20

## 2017-02-07 RX ADMIN — SUCRALFATE SCH GM: 1 SUSPENSION ORAL at 06:40

## 2017-02-07 NOTE — CONS
Date/Time of Note


Date/Time of Note


DATE: 2/7/17 


TIME: 11:50





Consult Date/Type/Reason


Admit Date/Time


Jan 24, 2017 at 13:35


Type of Consultation:  med/nephro





Subjective


Feeling better





Objective


min assist transfer


MI with electric wc propulsion


 Vital Signs








  Date Time  Temp Pulse Resp B/P Pulse Ox O2 Delivery O2 Flow Rate FiO2


 


2/7/17 08:05 97.8 72 18 140/65 95   


 


2/6/17 19:30      Room Air  














 Intake and Output   


 


 2/6/17 2/6/17 2/7/17





 15:00 23:00 07:00


 


Intake Total  240 ml 550 ml


 


Balance  240 ml 550 ml











Results/Medications


Medications





 Current Medications


Trazodone HCl (Desyrel) 100 mg HS PO  Last administered on 2/6/17at 21:14; 

Admin Dose 100 MG;  Start 1/24/17 at 21:00


Tamsulosin HCl (Flomax) 0.4 mg HS PO  Last administered on 2/6/17at 21:14; 

Admin Dose 0.4 MG;  Start 1/24/17 at 21:00


Oxycodone/ Acetaminophen (Percocet (5/ 325)) 1 tab Q3H  PRN PO PAIN LEVEL 1-5 

Last administered on 1/27/17at 20:26; Admin Dose 1 TAB;  Start 1/24/17 at 15:00


Oxycodone/ Acetaminophen (Percocet (5/ 325)) 2 tab Q3H  PRN PO PAIN LEVEL 6-10 

Last administered on 2/6/17at 18:28; Admin Dose 2 TAB;  Start 1/24/17 at 15:00


Ondansetron HCl (Zofran Inj) 4 mg Q6H  PRN IV NAUSEA AND/OR VOMITING;  Start 1/ 24/17 at 15:00


Sucralfate (Carafate Susp) 1 gm Q6 PO  Last administered on 2/7/17at 06:40; 

Admin Dose 1 GM;  Start 1/24/17 at 18:00


Potassium Chloride (Klor-Con 20) 20 meq DAILY PO  Last administered on 1/28/ 17at 08:05; Admin Dose 20 MEQ;  Start 1/25/17 at 09:00


Multivitamins Therapeutic (Theragran) 1 tab DAILY PO  Last administered on 2/7/ 17at 09:21; Admin Dose 1 TAB;  Start 1/25/17 at 09:00


Miscellaneous Information 1 ea NOTE XX ;  Start 1/24/17 at 15:00


Acetaminophen/ Hydrocodone Bitart (Norco (5/325)) 1 tab Q4H  PRN PO severe pain 

Last administered on 1/28/17at 08:12; Admin Dose 1 TAB;  Start 1/24/17 at 15:00


Guaifenesin (Robitussin Liquid Cup) 200 mg Q6 PO  Last administered on 2/7/17at 

06:40; Admin Dose 200 MG;  Start 1/24/17 at 18:00


Ketoconazole (Nizoral Cr) 1 applic BID TOP  Last administered on 2/7/17at 09:20

; Admin Dose 1 APPLIC;  Start 1/24/17 at 21:00


Atorvastatin Calcium (Lipitor) 80 mg HS PO  Last administered on 2/6/17at 21:14

; Admin Dose 80 MG;  Start 1/24/17 at 21:00


Fluoxetine HCl (Prozac) 40 mg DAILY PO  Last administered on 2/7/17at 09:21; 

Admin Dose 40 MG;  Start 1/25/17 at 09:00


Clonazepam 1 mg 1 mg BID PO  Last administered on 2/7/17at 09:20; Admin Dose 1 

MG;  Start 1/24/17 at 21:00


Albumin Human 250 ml @  500 mls/hr PRN  PRN IV CVP< 8, OR SBP<90;  Start 1/24/ 17 at 15:00


Acetaminophen (Tylenol Tab) 650 mg Q3H  PRN PO ELEVATED TEMPERATURE Last 

administered on 1/31/17at 18:14; Admin Dose 650 MG;  Start 1/24/17 at 15:00


Aspirin (Aspirin) 325 mg DAILY PO  Last administered on 2/7/17at 09:20; Admin 

Dose 325 MG;  Start 1/25/17 at 09:00


Enoxaparin Sodium (Lovenox) 40 mg DAILY SC  Last administered on 2/7/17at 09:30

; Admin Dose 40 MG;  Start 1/25/17 at 09:00


Clonazepam (Klonopin) 1 mg Q8H  PRN PO ANXIETY;  Start 1/24/17 at 15:00


Albuterol/ Ipratropium (Duoneb) 3 ml PRN  PRN HHN WHEEZING AND SOB;  Start 1/24/ 17 at 15:00


Famotidine (Pepcid) 20 mg BID PO  Last administered on 2/7/17at 09:20; Admin 

Dose 20 MG;  Start 1/24/17 at 21:00


Guaifenesin/ Codeine Phosphate (Robitussin Ac Liquid Cup) 10 ml Q4H  PRN PO 

COUGH Last administered on 2/6/17at 08:48; Admin Dose 10 ML;  Start 1/24/17 at 

15:00


Hydromorphone HCl (Dilaudid) 0.2 mg Q1H  PRN IV PAIN LEVEL 1-5;  Start 1/24/17 

at 15:00


Phenol (Cepastat Lozenge) 1 lozenge Q4  PRN PO dry mouth;  Start 1/26/17 at 09:

30


Magnesium Hydroxide (Milk Of Mag) 30 ml DAILY  PRN PO CONSTIPATION Last 

administered on 2/1/17at 18:12; Admin Dose 30 ML;  Start 1/27/17 at 08:30


Metoprolol Tartrate (Lopressor) 12.5 mg BID PO  Last administered on 2/7/17at 09

:21; Admin Dose 12.5 MG;  Start 1/27/17 at 10:00


Hydromorphone HCl (Dilaudid) 0.4 mg Q1H  PRN IM PAIN LEVEL 6-10 Last 

administered on 1/29/17at 17:58; Admin Dose 0.4 MG;  Start 1/28/17 at 11:00


Bisacodyl (Dulcolax Supp) 10 mg DAILY  PRN [place] CONSTIPATION Last administered on 

2/7/17at 06:51; Admin Dose 10 MG;  Start 1/28/17 at 15:00


Docusate Sodium (Colace) 100 mg BID PO  Last administered on 2/7/17at 09:20; 

Admin Dose 100 MG;  Start 1/28/17 at 21:00











GABI MERCER MD Feb 7, 2017 11:51

## 2017-02-07 NOTE — PN
DATE:  02/07/2017

 

 

SUBJECTIVE:  The patient is stable, no acute events overnight.  No fevers, chills, nausea, vomiting 
 or shortness of breath.

 

OBJECTIVE:

VITAL SIGNS:  Blood pressure is 140/65, respirations 18, pulse 72, temperature 97.8.

HEENT:  Head is normocephalic.

NECK:  Supple.

HEART:  Regular rate.

LUNGS:  Show diminished breath sounds at the base.

ABDOMEN:  Soft, nontender to palpation without rebound or guarding.

EXTREMITIES:  Negative for clubbing, cyanosis, no edema.

DERMATOLOGIC:  No rashes.

MUSCULOSKELETAL:  No joint effusions.

NEUROLOGIC:  No change in exam.

 

MEDICATIONS:  Have been reviewed.

 

LABORATORY DATA:  Has been reviewed.  No new labs.

 

ASSESSMENT AND PLAN:

1.  Non-ST elevation myocardial infarction.  The patient is status post CABG on 01/06/2016.  Patient
 clinically stable.  Continue PT, OT.

2.  Acute diastolic heart failure.  The patient is currently euvolemic.  Continue medical management
.

3.  Orthostatic hypotension, resolved.

4.  ICU myopathy.  The patient is clinically improving.  Continue physical therapy.

5.  Anemia.  Continue to monitor hemoglobin and hematocrit levels.

6.  Benign prostatic hypertrophy.  Continue Flomax.

7.  Neuropathy.  Continue Neurontin.

8.  Mild leukopenia.  Continue to monitor.

9.  Depression and anxiety disorder.  Continue Prozac and Klonopin.

10.  Deep venous thrombosis prophylaxis.  Continue proton pump inhibitor and Lovenox.

11.  Status post pneumonia.

12.  Status post respiratory failure.

 

 

Dictated By: CHAVA VELOZ/DIMPLE

DD:    02/07/2017 09:41:23

DT:    02/07/2017 12:32:31

Conf#: [id number]

DID#:  [id number]

## 2017-02-08 VITALS — DIASTOLIC BLOOD PRESSURE: 66 MMHG | RESPIRATION RATE: 18 BRPM | SYSTOLIC BLOOD PRESSURE: 146 MMHG

## 2017-02-08 RX ADMIN — METOPROLOL TARTRATE SCH MG: 25 TABLET, FILM COATED ORAL at 10:21

## 2017-02-08 RX ADMIN — SUCRALFATE SCH GM: 1 SUSPENSION ORAL at 13:48

## 2017-02-08 RX ADMIN — GUAIFENESIN SCH MG: 100 SOLUTION ORAL at 06:39

## 2017-02-08 RX ADMIN — DOCUSATE SODIUM SCH MG: 100 CAPSULE, LIQUID FILLED ORAL at 10:21

## 2017-02-08 RX ADMIN — SUCRALFATE SCH GM: 1 SUSPENSION ORAL at 06:39

## 2017-02-08 RX ADMIN — ASPIRIN 325 MG ORAL TABLET SCH MG: 325 PILL ORAL at 09:00

## 2017-02-08 RX ADMIN — GUAIFENESIN SCH MG: 100 SOLUTION ORAL at 00:00

## 2017-02-08 RX ADMIN — THERA TABS SCH TAB: TAB at 10:21

## 2017-02-08 RX ADMIN — ENOXAPARIN SODIUM SCH MG: 100 INJECTION SUBCUTANEOUS at 10:34

## 2017-02-08 RX ADMIN — FAMOTIDINE SCH MG: 20 TABLET ORAL at 10:22

## 2017-02-08 RX ADMIN — GUAIFENESIN SCH MG: 100 SOLUTION ORAL at 13:48

## 2017-02-08 RX ADMIN — FLUOXETINE SCH MG: 20 CAPSULE ORAL at 10:20

## 2017-02-08 RX ADMIN — SUCRALFATE SCH GM: 1 SUSPENSION ORAL at 00:00

## 2017-02-08 NOTE — CONS
DATE OF ADMISSION: 01/24/2017

DATE OF CONSULTATION:  02/08/2017

 

 

 

PSYCHOLOGY -- INDIVIDUAL SESSION -- [id number]

 

This is a followup psychological progress note on a patient who was seen last 
week.  The patient was seen in bed.  The patient was preparing for discharge 
today. The patient is going back to his assisted living situation that he was 
in prior to entering the hospital.  The patient feels better and says that he 
feels he has made progress while he is in the hospital.  The patient is still 
very frustrated about his medical condition as he still does not feel that he 
is functioning as he would like to.  I worked with the patient to try to help 
encourage him to continue to work on his physical and emotional health.

 

 

Dictated By: KATEY ANDERSON PHD

 

VIKTORIYA/DIMPLE

DD:    02/08/2017 17:17:17

DT:    02/08/2017 17:37:38

Conf#: [id number]

DID#:  [id number]

 

MTDD

## 2017-02-08 NOTE — PN
DATE:  02/08/2017

 

 

SUBJECTIVE:  The patient is stable, no acute events overnight.  No fevers, chills, nausea or vomitin
g.  No shortness of breath.

 

OBJECTIVE:

VITAL SIGNS:  Blood pressure is 125/63, respiration 19, pulse 77, temperature 99.0.

HEENT:  Head is normocephalic.

NECK:  Supple.

HEART:  Regular rate.

LUNGS:  Show diminished breath sounds at base.

ABDOMEN:  Soft, nontender to palpation.  No rebound or guarding.

EXTREMITIES:  Negative for clubbing, cyanosis, edema.

DERMATOLOGIC:  No rashes.

MUSCULOSKELETAL:  No joint effusions.

NEUROLOGIC:  No change in exam.

 

MEDICATIONS:  The patient's medications have been reviewed.

 

LABORATORY DATA:  Reviewed.  No new labs.

 

ASSESSMENT AND PLAN:

1.  Non-ST elevation myocardial infarction.  The patient is status post CABG on 01/06/2017.  The pat
ient is clinically stable.  Continue PT, OT.

2.  Acute diastolic heart failure, currently euvolemic.  Continue medical management.

3.  ICU myopathy, improving.  Continue physical therapy.

4.  Anemia.  Continue to monitor hemoglobin and hematocrit levels.

5.  Benign prostatic hypertrophy.  Continue Flomax.

6.  Neuropathy.  Continue Neurontin.

7.  Mild leukopenia.  Continue to monitor.

8.  Depression and anxiety disorder.  Continue Prozac and Klonopin.

9.  Gastrointestinal and deep venous thrombosis prophylaxis.  Continue proton pump inhibitor and Lov
enox.

10.  Status post pneumonia.

11.  Status post respiratory failure.

 

 

Dictated By: CHAVA VELOZ/DIMPLE

DD:    02/08/2017 08:59:20

DT:    02/08/2017 11:36:22

Conf#: [id number]

DID#:  [id number]

## 2017-03-17 ENCOUNTER — HOSPITAL ENCOUNTER (INPATIENT)
Dept: HOSPITAL 54 - ER | Age: 77
LOS: 4 days | Discharge: NURSING, REHAB OR LONG-TERM CARE | DRG: 291 | End: 2017-03-21
Attending: FAMILY MEDICINE | Admitting: FAMILY MEDICINE
Payer: MEDICARE

## 2017-03-17 VITALS — SYSTOLIC BLOOD PRESSURE: 122 MMHG | DIASTOLIC BLOOD PRESSURE: 76 MMHG

## 2017-03-17 VITALS — HEIGHT: 65 IN | BODY MASS INDEX: 30.32 KG/M2 | WEIGHT: 182 LBS

## 2017-03-17 DIAGNOSIS — E78.5: ICD-10-CM

## 2017-03-17 DIAGNOSIS — I50.33: Primary | ICD-10-CM

## 2017-03-17 DIAGNOSIS — R53.2: ICD-10-CM

## 2017-03-17 DIAGNOSIS — I24.1: ICD-10-CM

## 2017-03-17 DIAGNOSIS — N40.0: ICD-10-CM

## 2017-03-17 DIAGNOSIS — E44.1: ICD-10-CM

## 2017-03-17 DIAGNOSIS — Z95.1: ICD-10-CM

## 2017-03-17 DIAGNOSIS — J98.11: ICD-10-CM

## 2017-03-17 DIAGNOSIS — J15.6: ICD-10-CM

## 2017-03-17 DIAGNOSIS — Z96.643: ICD-10-CM

## 2017-03-17 DIAGNOSIS — J15.9: ICD-10-CM

## 2017-03-17 DIAGNOSIS — J90: ICD-10-CM

## 2017-03-17 DIAGNOSIS — I25.10: ICD-10-CM

## 2017-03-17 DIAGNOSIS — F32.9: ICD-10-CM

## 2017-03-17 DIAGNOSIS — Z66: ICD-10-CM

## 2017-03-17 DIAGNOSIS — D64.9: ICD-10-CM

## 2017-03-17 LAB
ALBUMIN SERPL BCP-MCNC: 3.2 G/DL (ref 3.4–5)
ALP SERPL-CCNC: 100 U/L (ref 46–116)
ALT SERPL W P-5'-P-CCNC: 22 U/L (ref 12–78)
ANISOCYTOSIS BLD QL: (no result)
APTT PPP: 28 SEC (ref 23–34)
AST SERPL W P-5'-P-CCNC: 20 U/L (ref 15–37)
BASOPHILS # BLD AUTO: 0.1 /CMM (ref 0–0.2)
BASOPHILS NFR BLD AUTO: 0.9 % (ref 0–2)
BASOPHILS NFR BLD MANUAL: 0 % (ref 0–2)
BILIRUB DIRECT SERPL-MCNC: 0.2 MG/DL (ref 0–0.2)
BILIRUB SERPL-MCNC: 0.4 MG/DL (ref 0.2–1)
BUN SERPL-MCNC: 14 MG/DL (ref 7–18)
CALCIUM SERPL-MCNC: 8.4 MG/DL (ref 8.5–10.1)
CHLORIDE SERPL-SCNC: 98 MMOL/L (ref 98–107)
CO2 SERPL-SCNC: 29 MMOL/L (ref 21–32)
CREAT SERPL-MCNC: 1.1 MG/DL (ref 0.6–1.3)
DACRYOCYTES BLD QL SMEAR: (no result)
EOSINOPHIL # BLD AUTO: 0 /CMM (ref 0–0.7)
EOSINOPHIL NFR BLD AUTO: 0.1 % (ref 0–6)
EOSINOPHIL NFR BLD MANUAL: 0 % (ref 0–4)
FERRITIN SERPL-MCNC: 122 NG/ML (ref 8–388)
GLUCOSE SERPL-MCNC: 102 MG/DL (ref 74–106)
HCT VFR BLD AUTO: 31 % (ref 39–51)
HGB BLD-MCNC: 10.5 G/DL (ref 13.5–17.5)
INR PPP: 1.13 (ref 0.87–1.13)
IRON SERPL-MCNC: 19 UG/DL (ref 50–175)
LYMPHOCYTES NFR BLD AUTO: 1.1 /CMM (ref 0.8–4.8)
LYMPHOCYTES NFR BLD AUTO: 12.6 % (ref 20–44)
LYMPHOCYTES NFR BLD MANUAL: 2 % (ref 16–48)
MCH RBC QN AUTO: 27 PG (ref 26–33)
MCHC RBC AUTO-ENTMCNC: 33 G/DL (ref 31–36)
MCV RBC AUTO: 80 FL (ref 80–96)
MONOCYTES NFR BLD AUTO: 1.8 /CMM (ref 0.1–1.3)
MONOCYTES NFR BLD AUTO: 20.8 % (ref 2–12)
MONOCYTES NFR BLD MANUAL: 13 % (ref 0–11)
NEUTROPHILS # BLD AUTO: 5.8 /CMM (ref 1.8–8.9)
NEUTROPHILS NFR BLD AUTO: 65.6 % (ref 43–81)
NEUTS BAND NFR BLD MANUAL: 4 % (ref 0–5)
NEUTS SEG NFR BLD MANUAL: 64 % (ref 42–76)
NT-PROBNP SERPL-MCNC: 1286 PG/ML (ref 0–125)
OVALOCYTES BLD QL SMEAR: (no result)
PLATELET # BLD AUTO: 222 /CMM (ref 150–450)
PLATELET BLD QL SMEAR: ADEQUATE
POTASSIUM SERPL-SCNC: 3.9 MMOL/L (ref 3.5–5.1)
PROT SERPL-MCNC: 7.8 G/DL (ref 6.4–8.2)
PROTHROMBIN TIME: 11.9 SECS (ref 9.5–12.7)
RBC # BLD AUTO: 3.92 MIL/UL (ref 4.5–6)
RDW COEFFICIENT OF VARIATION: 15.1 (ref 11.5–15)
SODIUM SERPL-SCNC: 135 MMOL/L (ref 136–145)
TIBC SERPL-MCNC: 261 UG/DL (ref 250–450)
TROPONIN I SERPL-MCNC: 0.02 NG/ML (ref 0–0.06)
VARIANT LYMPHS NFR BLD MANUAL: 17 % (ref 0–0)
WBC NRBC COR # BLD AUTO: 8.8 K/UL (ref 4.3–11)

## 2017-03-17 PROCEDURE — A4606 OXYGEN PROBE USED W OXIMETER: HCPCS

## 2017-03-17 PROCEDURE — 0W9B3ZX DRAINAGE OF LEFT PLEURAL CAVITY, PERCUTANEOUS APPROACH, DIAGNOSTIC: ICD-10-PCS

## 2017-03-17 PROCEDURE — Z7610: HCPCS

## 2017-03-17 RX ADMIN — TRAZODONE HYDROCHLORIDE SCH MG: 50 TABLET ORAL at 21:09

## 2017-03-17 RX ADMIN — ASPIRIN 81 MG SCH MG: 81 TABLET ORAL at 21:09

## 2017-03-17 RX ADMIN — GUAIFENESIN AND DEXTROMETHORPHAN PRN ML: 100; 10 SYRUP ORAL at 21:40

## 2017-03-17 NOTE — NUR
PT BIB PA C/O COUGH/CONGESTION X3 MONTHS, SINCE BEFORE CABG AT "THE BEGINNING 
OF THE YEAR", BUT INCREASING SOB X1 WK. PT C/O DYSPNEA BUT DOES NOT APPEAR IN 
RESPIRATORY DISTRESS; EVEN UNLABORED RESPIRATIONS. PRODUCTIVE COUGH. SKIN WARM 
NONDIAPHORETIC. IN ER BED 11 ON MONITOR.

## 2017-03-17 NOTE — NUR
ADMISSION NOTES:

RECEIVED REPORT FROM YARELIS DALTON RN. PT CAME IN FROM [facility], DUE TO CONGESTION 
PRODUCTIVE COUGH AND INCREASING SOB X1WEEK, PT BROUGHT TO THE UNIT VIA STRETCHER, PT A/O X4, 
ON 2L VIA NC, DENIES ANY SOB AT THIS TIME. PT PLACED ON TELE MONITOR, ON SINUS RHYTHM HR 82, 
DENIES ANY CHEST PAIN OR DISCOMFORT AT THIS TIME. PT HAS RIGHT AC G20 PATENT AND FLUSHING 
WELL, ON HL. ORIENTED PT TO UNIT POLICY AND HOURLY ROUNDING. PERFORMED SKIN ASSESSMENT AND 
NOTED SKIN ISSUES, PLEASE REFER TO PICTURES AND SKIN ASSESSMENT PAGE. BLE KEPT OFFLOADED. VS 
TAKEN AND RECORDED. SAFETY PRECAUTIONS FOR FALL INITIATED CALL LIGHT IN REACH WILL CONTINUE 
TO MONITOR

-------------------------------------------------------------------------------

Addendum: 03/18/17 at 0630 by BRICE BERRY RN

-------------------------------------------------------------------------------

CORRECTION OF ENTRY:

PT CAME FROM [facility] AT [facility],

## 2017-03-17 NOTE — NUR
tele rn notes:

pt requesting for cough medicine, he specifically said he would like cough medicine with 
codeine, no prn cough medicine order from md, paged dr wu awaiting for call back

## 2017-03-17 NOTE — NUR
tele rn notes:

received call from dr wu relayed about pt's request for cough medication, per md 
stated give robitussin dm 5mg q4 hr for cough and also relayed about pt code status per pt 
he would like to be dnr and dni, he stated he has living will copy at facility, and he would 
like to be dnr dni now while in the hospital, per md stated "okay",

## 2017-03-17 NOTE — NUR
TELE RN NOTES:

WHEN ASKED PT REGARDING VACCINATION STATUS, HE STATED HE RECEIVED PNEUMOCOCCAL VACCINE 
1DOSE, BUT CANT REMEMBER EXACT DATE AND YEAR, ALSO FLU VACCINE LAST YEAR BUT CANT RECALL 
EXACT MONTH/DATE

## 2017-03-17 NOTE — NUR
TELE RN NOTES:

DR BELTRE IN THE UNIT TALKING TO THE PT, ALSO PER MD, OKAY TO PUT IN THE DNR DNI CODE 
STATUS

## 2017-03-17 NOTE — NUR
tele rn notes:

pt requested for cough medication prn robitussin 5ml syrup administered to the pt at [facility], educate pt regarding medication side effect, will continue to monitor

## 2017-03-18 VITALS — DIASTOLIC BLOOD PRESSURE: 71 MMHG | SYSTOLIC BLOOD PRESSURE: 111 MMHG

## 2017-03-18 VITALS — SYSTOLIC BLOOD PRESSURE: 127 MMHG | DIASTOLIC BLOOD PRESSURE: 74 MMHG

## 2017-03-18 VITALS — SYSTOLIC BLOOD PRESSURE: 122 MMHG | DIASTOLIC BLOOD PRESSURE: 82 MMHG

## 2017-03-18 VITALS — SYSTOLIC BLOOD PRESSURE: 102 MMHG | DIASTOLIC BLOOD PRESSURE: 63 MMHG

## 2017-03-18 VITALS — SYSTOLIC BLOOD PRESSURE: 119 MMHG | DIASTOLIC BLOOD PRESSURE: 72 MMHG

## 2017-03-18 LAB
ANISOCYTOSIS BLD QL: (no result)
BASOPHILS # BLD AUTO: 0 /CMM (ref 0–0.2)
BASOPHILS NFR BLD AUTO: 0.4 % (ref 0–2)
BUN SERPL-MCNC: 12 MG/DL (ref 7–18)
CALCIUM SERPL-MCNC: 8.2 MG/DL (ref 8.5–10.1)
CHLORIDE SERPL-SCNC: 101 MMOL/L (ref 98–107)
CHOLEST SERPL-MCNC: 94 MG/DL (ref ?–200)
CO2 SERPL-SCNC: 30 MMOL/L (ref 21–32)
CREAT SERPL-MCNC: 1 MG/DL (ref 0.6–1.3)
EOSINOPHIL # BLD AUTO: 0 /CMM (ref 0–0.7)
EOSINOPHIL NFR BLD AUTO: 0.6 % (ref 0–6)
GLUCOSE SERPL-MCNC: 107 MG/DL (ref 74–106)
HCT VFR BLD AUTO: 32 % (ref 39–51)
HDLC SERPL-MCNC: 42 MG/DL (ref 40–60)
HGB BLD-MCNC: 10.2 G/DL (ref 13.5–17.5)
HYPOCHROMIA BLD QL: (no result)
LDLC SERPL DIRECT ASSAY-MCNC: 43 MG/DL (ref 0–99)
LYMPHOCYTES NFR BLD AUTO: 0.9 /CMM (ref 0.8–4.8)
LYMPHOCYTES NFR BLD AUTO: 12.1 % (ref 20–44)
LYMPHOCYTES NFR BLD MANUAL: 13 % (ref 16–48)
MAGNESIUM SERPL-MCNC: 1.8 MG/DL (ref 1.8–2.4)
MCH RBC QN AUTO: 27 PG (ref 26–33)
MCHC RBC AUTO-ENTMCNC: 32 G/DL (ref 31–36)
MCV RBC AUTO: 83 FL (ref 80–96)
MONOCYTES NFR BLD AUTO: 1.5 /CMM (ref 0.1–1.3)
MONOCYTES NFR BLD AUTO: 21.3 % (ref 2–12)
MONOCYTES NFR BLD MANUAL: 25 % (ref 0–11)
NEUTROPHILS # BLD AUTO: 4.8 /CMM (ref 1.8–8.9)
NEUTROPHILS NFR BLD AUTO: 65.6 % (ref 43–81)
NEUTS SEG NFR BLD MANUAL: 62 % (ref 42–76)
PHOSPHATE SERPL-MCNC: 3.8 MG/DL (ref 2.5–4.9)
PLATELET # BLD AUTO: 229 /CMM (ref 150–450)
PLATELET BLD QL SMEAR: ADEQUATE
POTASSIUM SERPL-SCNC: 3.9 MMOL/L (ref 3.5–5.1)
RBC # BLD AUTO: 3.86 MIL/UL (ref 4.5–6)
RDW COEFFICIENT OF VARIATION: 16.1 (ref 11.5–15)
SODIUM SERPL-SCNC: 140 MMOL/L (ref 136–145)
TRIGL SERPL-MCNC: 47 MG/DL (ref 30–150)
WBC NRBC COR # BLD AUTO: 7.3 K/UL (ref 4.3–11)

## 2017-03-18 RX ADMIN — FUROSEMIDE SCH MG: 10 INJECTION, SOLUTION INTRAMUSCULAR; INTRAVENOUS at 08:18

## 2017-03-18 RX ADMIN — FERROUS SULFATE TAB 325 MG (65 MG ELEMENTAL FE) SCH MG: 325 (65 FE) TAB at 16:08

## 2017-03-18 RX ADMIN — ALUMINUM HYDROXIDE, MAGNESIUM HYDROXIDE, AND SIMETHICONE PRN ML: 200; 200; 20 SUSPENSION ORAL at 14:25

## 2017-03-18 RX ADMIN — GUAIFENESIN AND DEXTROMETHORPHAN PRN ML: 100; 10 SYRUP ORAL at 07:06

## 2017-03-18 RX ADMIN — ENOXAPARIN SODIUM SCH MG: 40 INJECTION SUBCUTANEOUS at 12:30

## 2017-03-18 RX ADMIN — GUAIFENESIN AND DEXTROMETHORPHAN PRN ML: 100; 10 SYRUP ORAL at 10:29

## 2017-03-18 RX ADMIN — ALBUTEROL SULFATE PRN MG: 2.5 SOLUTION RESPIRATORY (INHALATION) at 19:58

## 2017-03-18 RX ADMIN — TAMSULOSIN HYDROCHLORIDE SCH MG: 0.4 CAPSULE ORAL at 08:17

## 2017-03-18 RX ADMIN — DEXTROSE MONOHYDRATE PRN MG: 50 INJECTION, SOLUTION INTRAVENOUS at 22:06

## 2017-03-18 RX ADMIN — FLUOXETINE HYDROCHLORIDE SCH MG: 20 CAPSULE ORAL at 09:40

## 2017-03-18 RX ADMIN — GUAIFENESIN AND DEXTROMETHORPHAN PRN ML: 100; 10 SYRUP ORAL at 22:06

## 2017-03-18 RX ADMIN — DEXTROSE MONOHYDRATE PRN MG: 50 INJECTION, SOLUTION INTRAVENOUS at 16:08

## 2017-03-18 RX ADMIN — GUAIFENESIN AND DEXTROMETHORPHAN PRN ML: 100; 10 SYRUP ORAL at 14:26

## 2017-03-18 RX ADMIN — GUAIFENESIN AND DEXTROMETHORPHAN PRN ML: 100; 10 SYRUP ORAL at 18:02

## 2017-03-18 RX ADMIN — DEXTROSE MONOHYDRATE PRN MG: 50 INJECTION, SOLUTION INTRAVENOUS at 10:28

## 2017-03-18 RX ADMIN — TRAZODONE HYDROCHLORIDE SCH MG: 50 TABLET ORAL at 21:18

## 2017-03-18 RX ADMIN — ATORVASTATIN CALCIUM SCH MG: 10 TABLET, FILM COATED ORAL at 08:17

## 2017-03-18 RX ADMIN — GUAIFENESIN AND DEXTROMETHORPHAN PRN ML: 100; 10 SYRUP ORAL at 01:40

## 2017-03-18 RX ADMIN — ZOLPIDEM TARTRATE PRN MG: 5 TABLET, FILM COATED ORAL at 22:38

## 2017-03-18 RX ADMIN — ASPIRIN 81 MG SCH MG: 81 TABLET ORAL at 21:18

## 2017-03-18 RX ADMIN — THERA TABS SCH UDTAB: TAB at 08:16

## 2017-03-18 NOTE — NUR
TELE RN NOTES:

CONTACTED THE VILLAGE AT [facility] -[phone], CALLED 3X BUT EVERY TIME  
WILL TRANSFER ME TO THE RN, THE CALL GOES BACK TO THE , SHE STATED IT WOULD BE BEST 
TO CALL AT 0700AM AS RN STILL WITH ANOTHER RESIDENT, REASON FOR CALLING FACILITY IS TO 
OBTAIN COPY OF PT'S LIVING WILL

## 2017-03-18 NOTE — NUR
TELE RN NOTES:

PT REQUESTED FOR COUGH MEDICATION, PRN ROBITUSSIN 5ML ADMINISTERED TO THE PT AT THIS TIME,

## 2017-03-18 NOTE — NUR
TELE RN NOTES:

CONTACTED THE VILLAGE AT [facility] [phone], CALLED 3X BUT EVERY TIME  
WILL TRANSFER ME TO THE RN, THE CALL GOES BACK TO THE , SHE STATED IT WOULD BE BEST 
TO CALL AT 0700AM AS RN STILL WITH ANOTHER RESIDENT

## 2017-03-18 NOTE — NUR
RN NOTE



RECEIVED REPORT. PT AWAKE AND ALERT X3. S/P THORACENTESIS WITH DR. ENRIQUEZ. ON NC AT 2L. 
REQUESTING BREATHING TREATMENT FOR SOB. RT PAGED. TELE ATTACHED. LAC INTACT AND PATENT.HOB 
ELEVATED 30 DEGREES. NO BLEEDING NOTED. WILL CONT TO MONITOR. 1:1 SITTER AT BEDSIDE.

## 2017-03-18 NOTE — NUR
RN TELE NOTES

CALLED THE VILLAGE AT [facility] FOR A COPY OF PT'S LIVING WILL, LEFT A MESSAGE WITH 
THE FRONT DEST, AWAITING CALL BACK.

## 2017-03-18 NOTE — NUR
RN TELE NOTES

PT IN BED, AWAKE, ALERT AND ORIENTED, NO COMPLAINT OF PAIN, NOT IN DISTRESS, COUGH 
MEDICATION GIVEN AS NEEDED, S/P THORACENTESIS BY DR. ENRIQUEZ, DONE AT BEDSIDE, TOLERATED WELL, 
1600CC OF FLUID TAKEN, SPECIMEN TAKEN TO THE LAB, PM CARE RENDERED, TURNED AND REPOSITIONED 
Q2 HRS, ALL NEEDS ATTENDED.

## 2017-03-18 NOTE — NUR
TELE RN NOTES:

PT REQUESTED FOR COUGH MEDICINE, PRN ROBITUSSIN 5ML ADMINISTERED TO THE PT AT THIS TIME, 
WILL CONTINUE TO MONITOR AND REASSESS

## 2017-03-18 NOTE — NUR
TELE RN NOTES:

CONTACTED THE VILLAGE AT [facility] -[phone], TALKED TO  BUT NOBODY'S 
PICKING UP, SHE STATED TRY TO CALL BACK IN AM,

## 2017-03-18 NOTE — NUR
RN TELE NOTES

LOVENOX HELD, PT FOR POSSIBLE US GUIDED THORACENTESIS THIS AFTERNOON OR TOMORROW MORNING, PT 
INFORMED.

## 2017-03-18 NOTE — NUR
RN TELE NOTES

PT IN BED, AWAKE, NO COMPLAINT OF PAIN, NOT IN DISTRESS, WITH COMPLAINT OF NAUSEA, ZOFRAN 
GIVEN AS ORDERED, VERBALIZED RELIEF, CALL LIGHT WITHIN REACH, NEEDS ATTENDED, ASSISTED WITH 
TURNING AND REPOSITIONING.

## 2017-03-18 NOTE — NUR
RN TELE NOTES

PT TAKING PROZAC AND PRISTIQ AT THE [facility], GLENNY TENA, NP, OK TO D/C PRISTIQ. NOTED AND 
CARRIED OUT.

## 2017-03-18 NOTE — NUR
TELE RN CLOSING NOTES:

PT ON BED, AWAKE, REMAINS ON 2L VIA NC, RESPIRATION EVEN AND UNLABORED, DENIES ANY SOB AT 
THIS TIME, DENIES ANY CHEST PAIN OR DISCOMFORT. REMAINS ON SINUS RHYTHM HR 75. IV ACCESS 
REMAINS PATENT AND FLUSHING WELL, ON HL. BLE KEPT OFFLOADED. VS REMAINS STABLE, NEEDS 
ATTENDED. SAFETY PRECAUTIONS FOR FALL REMAINS ENGAGED, CALL LIGHT IN REACH, WILL ENDORSE TO 
DAY RN FOR BLAINE.

## 2017-03-18 NOTE — NUR
RN TELE NOTES

PT IN BED, AWAKE, ALERT AND ORIENTED, NO COMPLAINT OF PAIN, RESPIRATIONS NORMAL AND NOT 
LABORED, CALL LIGHT WITHIN REACH.

## 2017-03-19 VITALS — DIASTOLIC BLOOD PRESSURE: 60 MMHG | SYSTOLIC BLOOD PRESSURE: 120 MMHG

## 2017-03-19 VITALS — SYSTOLIC BLOOD PRESSURE: 137 MMHG | DIASTOLIC BLOOD PRESSURE: 76 MMHG

## 2017-03-19 VITALS — SYSTOLIC BLOOD PRESSURE: 105 MMHG | DIASTOLIC BLOOD PRESSURE: 64 MMHG

## 2017-03-19 VITALS — SYSTOLIC BLOOD PRESSURE: 103 MMHG | DIASTOLIC BLOOD PRESSURE: 46 MMHG

## 2017-03-19 VITALS — DIASTOLIC BLOOD PRESSURE: 63 MMHG | SYSTOLIC BLOOD PRESSURE: 115 MMHG

## 2017-03-19 LAB
BASOPHILS # BLD AUTO: 0 /CMM (ref 0–0.2)
BASOPHILS NFR BLD AUTO: 0 % (ref 0–2)
BUN SERPL-MCNC: 19 MG/DL (ref 7–18)
CALCIUM SERPL-MCNC: 8.7 MG/DL (ref 8.5–10.1)
CHLORIDE SERPL-SCNC: 97 MMOL/L (ref 98–107)
CO2 SERPL-SCNC: 29 MMOL/L (ref 21–32)
CREAT SERPL-MCNC: 1.1 MG/DL (ref 0.6–1.3)
EOSINOPHIL # BLD AUTO: 0 /CMM (ref 0–0.7)
EOSINOPHIL NFR BLD AUTO: 0 % (ref 0–6)
GLUCOSE SERPL-MCNC: 130 MG/DL (ref 74–106)
HCT VFR BLD AUTO: 35 % (ref 39–51)
HGB BLD-MCNC: 11.5 G/DL (ref 13.5–17.5)
LYMPHOCYTES NFR BLD AUTO: 0.8 /CMM (ref 0.8–4.8)
LYMPHOCYTES NFR BLD AUTO: 3.4 % (ref 20–44)
MAGNESIUM SERPL-MCNC: 2 MG/DL (ref 1.8–2.4)
MCH RBC QN AUTO: 27 PG (ref 26–33)
MCHC RBC AUTO-ENTMCNC: 33 G/DL (ref 31–36)
MCV RBC AUTO: 82 FL (ref 80–96)
MONOCYTES NFR BLD AUTO: 1.7 /CMM (ref 0.1–1.3)
MONOCYTES NFR BLD AUTO: 7.7 % (ref 2–12)
NEUTROPHILS # BLD AUTO: 20.2 /CMM (ref 1.8–8.9)
NEUTROPHILS NFR BLD AUTO: 88.9 % (ref 43–81)
PHOSPHATE SERPL-MCNC: 3.6 MG/DL (ref 2.5–4.9)
PLATELET # BLD AUTO: 255 /CMM (ref 150–450)
POTASSIUM SERPL-SCNC: 3.9 MMOL/L (ref 3.5–5.1)
RBC # BLD AUTO: 4.26 MIL/UL (ref 4.5–6)
RDW COEFFICIENT OF VARIATION: 16.5 (ref 11.5–15)
SODIUM SERPL-SCNC: 135 MMOL/L (ref 136–145)
WBC NRBC COR # BLD AUTO: 22.7 K/UL (ref 4.3–11)

## 2017-03-19 RX ADMIN — ALBUTEROL SULFATE PRN MG: 2.5 SOLUTION RESPIRATORY (INHALATION) at 14:37

## 2017-03-19 RX ADMIN — GUAIFENESIN AND DEXTROMETHORPHAN PRN ML: 100; 10 SYRUP ORAL at 12:47

## 2017-03-19 RX ADMIN — TAMSULOSIN HYDROCHLORIDE SCH MG: 0.4 CAPSULE ORAL at 08:24

## 2017-03-19 RX ADMIN — FLUOXETINE HYDROCHLORIDE SCH MG: 20 CAPSULE ORAL at 08:23

## 2017-03-19 RX ADMIN — FERROUS SULFATE TAB 325 MG (65 MG ELEMENTAL FE) SCH MG: 325 (65 FE) TAB at 16:27

## 2017-03-19 RX ADMIN — SODIUM CHLORIDE SCH MLS/HR: 9 INJECTION, SOLUTION INTRAVENOUS at 14:21

## 2017-03-19 RX ADMIN — THERA TABS SCH UDTAB: TAB at 08:24

## 2017-03-19 RX ADMIN — ALUMINUM HYDROXIDE, MAGNESIUM HYDROXIDE, AND SIMETHICONE PRN ML: 200; 200; 20 SUSPENSION ORAL at 15:46

## 2017-03-19 RX ADMIN — GUAIFENESIN AND DEXTROMETHORPHAN PRN ML: 100; 10 SYRUP ORAL at 20:00

## 2017-03-19 RX ADMIN — ACETYLCYSTEINE SCH MG: 100 INHALANT RESPIRATORY (INHALATION) at 14:37

## 2017-03-19 RX ADMIN — ENOXAPARIN SODIUM SCH MG: 40 INJECTION SUBCUTANEOUS at 08:34

## 2017-03-19 RX ADMIN — FUROSEMIDE SCH MG: 10 INJECTION, SOLUTION INTRAMUSCULAR; INTRAVENOUS at 08:24

## 2017-03-19 RX ADMIN — PANTOPRAZOLE SODIUM SCH MG: 40 TABLET, DELAYED RELEASE ORAL at 11:02

## 2017-03-19 RX ADMIN — ASPIRIN 81 MG SCH MG: 81 TABLET ORAL at 22:36

## 2017-03-19 RX ADMIN — GUAIFENESIN AND DEXTROMETHORPHAN PRN ML: 100; 10 SYRUP ORAL at 08:23

## 2017-03-19 RX ADMIN — ACETYLCYSTEINE SCH MG: 100 INHALANT RESPIRATORY (INHALATION) at 13:05

## 2017-03-19 RX ADMIN — CALCIUM CARBONATE (ANTACID) CHEW TAB 500 MG PRN MG: 500 CHEW TAB at 11:02

## 2017-03-19 RX ADMIN — TRAZODONE HYDROCHLORIDE SCH MG: 50 TABLET ORAL at 22:36

## 2017-03-19 RX ADMIN — ALBUTEROL SULFATE PRN MG: 2.5 SOLUTION RESPIRATORY (INHALATION) at 07:45

## 2017-03-19 RX ADMIN — ALUMINUM HYDROXIDE, MAGNESIUM HYDROXIDE, AND SIMETHICONE PRN ML: 200; 200; 20 SUSPENSION ORAL at 08:23

## 2017-03-19 RX ADMIN — ACETYLCYSTEINE SCH MG: 100 INHALANT RESPIRATORY (INHALATION) at 22:55

## 2017-03-19 RX ADMIN — ATORVASTATIN CALCIUM SCH MG: 10 TABLET, FILM COATED ORAL at 08:24

## 2017-03-19 RX ADMIN — FERROUS SULFATE TAB 325 MG (65 MG ELEMENTAL FE) SCH MG: 325 (65 FE) TAB at 08:23

## 2017-03-19 RX ADMIN — PIPERACILLIN SODIUM AND TAZOBACTAM SODIUM SCH MLS/HR: .5; 4 INJECTION, POWDER, LYOPHILIZED, FOR SOLUTION INTRAVENOUS at 18:20

## 2017-03-19 RX ADMIN — PIPERACILLIN SODIUM AND TAZOBACTAM SODIUM SCH MLS/HR: .5; 4 INJECTION, POWDER, LYOPHILIZED, FOR SOLUTION INTRAVENOUS at 13:37

## 2017-03-19 RX ADMIN — DEXTROSE MONOHYDRATE SCH MLS/HR: 50 INJECTION, SOLUTION INTRAVENOUS at 16:57

## 2017-03-19 NOTE — NUR
RN NOTE



NI SIGNIFICANT CHANGES THIS SHIFT. PT RESTING COMFORTABLY IN BED. AAOX4. NO S/S OF ANY 
DISTRESS AT THIS TIME. SATTING AT 95% ON 3L NC. HOB ELEVATED 30 DEGREES. TELE SHOWING SR IN 
90'S. IV SITE INTACT AND PATENT. 1:1 SITTER AT BEDSIDE. CALL LIGHT IN REACH.  WILL F/U WITH 
DAY SHIFT FOR BLAINE.

## 2017-03-19 NOTE — NUR
TELE RN OPENING NOTE

PATIENT IS AWAKE IN BED LOCKED IN LOWEST POSITION WITH SIDERAILS UP x2. NO PAIN AT THIS 
TIME. NO SOB OR DISTRESS NOTED. PATIENT STATED HE HAD DISCOMFORT WHILE SLEEPING LAST NIGHT 
DUE TO THORACENTESIS DONE YESTERDAY AFTERNOON. CALL LIGHT WITHIN REACH. SAFETY MEASURES 
IMPLEMENTED. STRICT I &O. CARDIAC DIET. IV INTACT AND PATENT NO REDNESS OR SWELLING NOTED. 
WILL CONTINUE TO MONITOR

## 2017-03-19 NOTE — NUR
RN NOTE



PT AAOX3, NO C/O OF PAIN OR DISCOMFORT. BREATHING EVEN AND NON-LABORED, NO SOB AT THIS TIME. 
ON NC 2L. HOB ELEVATED 30 DEGREES. RAC INTACT AND PATENT. M/S STATUS WILL CONT TO MONITOR. 
CALL LIGHT IN REACH.

## 2017-03-19 NOTE — NUR
TELE RN NOTE

PER MD ORDERS, CLARK CATHETER WAS INSERTED. PATIENT TOLERATED WELL. NO PAIN OR DISCOMFORT 
NOTED AT THIS TIME. WILL CONTINUE TO MONITOR

-------------------------------------------------------------------------------

Addendum: 03/19/17 at 1634 by ENRIQUE LEPE RN

-------------------------------------------------------------------------------

PLEASE DISREGARD THIS NOTE. ERROR

## 2017-03-19 NOTE — NUR
MS RN CLOSING NOTE

PATIENT IS ALERT AND ORIENTED x3. AWAKE IN BED LOCKED IN LOWEST POSITION WITH SIDERAILS UP 
X2. NO PAIN AT THIS TIME. NO SOB OR DISTRESS NOTED. ALL DUE MEDICATIONS GIVEN AS ORDERED. 
ALL NURSING CARE NEEDS ATTENDED TO. SAFETY MEASURES IMPLEMENTED. ON STRICT I&O. IV INTACT 
AND PATENT NO REDNESS OR SWELLING NOTED.  CALL LIGHT WITHIN REACH AT ALL TIMES. SPUTUM 
CULTURE COLLECTED. URINE CULTURE STILL PENDING, SPOKE WITH DARINEL, WILL FOLLOW UP. WILL 
ENDORSE TO NIGHT SHIFT NURSE FOR BLAINE

## 2017-03-20 VITALS — DIASTOLIC BLOOD PRESSURE: 75 MMHG | SYSTOLIC BLOOD PRESSURE: 127 MMHG

## 2017-03-20 VITALS — SYSTOLIC BLOOD PRESSURE: 127 MMHG | DIASTOLIC BLOOD PRESSURE: 75 MMHG

## 2017-03-20 VITALS — SYSTOLIC BLOOD PRESSURE: 114 MMHG | DIASTOLIC BLOOD PRESSURE: 70 MMHG

## 2017-03-20 VITALS — DIASTOLIC BLOOD PRESSURE: 52 MMHG | SYSTOLIC BLOOD PRESSURE: 92 MMHG

## 2017-03-20 LAB
ANISOCYTOSIS BLD QL: (no result)
BASOPHILS # BLD AUTO: 0 /CMM (ref 0–0.2)
BASOPHILS NFR BLD AUTO: 0 % (ref 0–2)
BUN SERPL-MCNC: 18 MG/DL (ref 7–18)
CALCIUM SERPL-MCNC: 8.3 MG/DL (ref 8.5–10.1)
CHLORIDE SERPL-SCNC: 96 MMOL/L (ref 98–107)
CO2 SERPL-SCNC: 31 MMOL/L (ref 21–32)
CREAT SERPL-MCNC: 1.1 MG/DL (ref 0.6–1.3)
EOSINOPHIL # BLD AUTO: 0 /CMM (ref 0–0.7)
EOSINOPHIL NFR BLD AUTO: 0 % (ref 0–6)
GLUCOSE PLR-MCNC: 111 MG/DL
GLUCOSE SERPL-MCNC: 158 MG/DL (ref 74–106)
HCT VFR BLD AUTO: 31 % (ref 39–51)
HGB BLD-MCNC: 10 G/DL (ref 13.5–17.5)
LYMPHOCYTES NFR BLD AUTO: 0.7 /CMM (ref 0.8–4.8)
LYMPHOCYTES NFR BLD AUTO: 5.4 % (ref 20–44)
LYMPHOCYTES NFR BLD MANUAL: 8 % (ref 16–48)
MCH RBC QN AUTO: 27 PG (ref 26–33)
MCHC RBC AUTO-ENTMCNC: 32 G/DL (ref 31–36)
MCV RBC AUTO: 82 FL (ref 80–96)
MONOCYTES NFR BLD AUTO: 15.5 % (ref 2–12)
MONOCYTES NFR BLD AUTO: 2 /CMM (ref 0.1–1.3)
MONOCYTES NFR BLD MANUAL: 14 % (ref 0–11)
NEUTROPHILS # BLD AUTO: 10.3 /CMM (ref 1.8–8.9)
NEUTROPHILS NFR BLD AUTO: 79.1 % (ref 43–81)
NEUTS SEG NFR BLD MANUAL: 78 % (ref 42–76)
PLATELET # BLD AUTO: 222 /CMM (ref 150–450)
PLATELET BLD QL SMEAR: ADEQUATE
POTASSIUM SERPL-SCNC: 3.5 MMOL/L (ref 3.5–5.1)
RBC # BLD AUTO: 3.76 MIL/UL (ref 4.5–6)
RDW COEFFICIENT OF VARIATION: 16.2 (ref 11.5–15)
SODIUM SERPL-SCNC: 135 MMOL/L (ref 136–145)
WBC NRBC COR # BLD AUTO: 13.1 K/UL (ref 4.3–11)

## 2017-03-20 RX ADMIN — PANTOPRAZOLE SODIUM SCH MG: 40 TABLET, DELAYED RELEASE ORAL at 08:19

## 2017-03-20 RX ADMIN — FERROUS SULFATE TAB 325 MG (65 MG ELEMENTAL FE) SCH MG: 325 (65 FE) TAB at 08:18

## 2017-03-20 RX ADMIN — Medication SCH EACH: at 16:30

## 2017-03-20 RX ADMIN — ACETYLCYSTEINE SCH MG: 100 INHALANT RESPIRATORY (INHALATION) at 15:17

## 2017-03-20 RX ADMIN — DEXTROSE MONOHYDRATE SCH MLS/HR: 50 INJECTION, SOLUTION INTRAVENOUS at 16:30

## 2017-03-20 RX ADMIN — ASPIRIN 81 MG SCH MG: 81 TABLET ORAL at 21:33

## 2017-03-20 RX ADMIN — FLUOXETINE HYDROCHLORIDE SCH MG: 20 CAPSULE ORAL at 08:18

## 2017-03-20 RX ADMIN — ACETYLCYSTEINE SCH MG: 100 INHALANT RESPIRATORY (INHALATION) at 22:50

## 2017-03-20 RX ADMIN — GUAIFENESIN AND DEXTROMETHORPHAN PRN ML: 100; 10 SYRUP ORAL at 19:47

## 2017-03-20 RX ADMIN — CALCIUM CARBONATE (ANTACID) CHEW TAB 500 MG PRN MG: 500 CHEW TAB at 23:25

## 2017-03-20 RX ADMIN — THERA TABS SCH UDTAB: TAB at 08:18

## 2017-03-20 RX ADMIN — ENOXAPARIN SODIUM SCH MG: 40 INJECTION SUBCUTANEOUS at 08:21

## 2017-03-20 RX ADMIN — Medication PRN OZ: at 08:32

## 2017-03-20 RX ADMIN — ZOLPIDEM TARTRATE PRN MG: 5 TABLET, FILM COATED ORAL at 23:25

## 2017-03-20 RX ADMIN — GUAIFENESIN AND DEXTROMETHORPHAN PRN ML: 100; 10 SYRUP ORAL at 00:46

## 2017-03-20 RX ADMIN — ALBUTEROL SULFATE PRN MG: 2.5 SOLUTION RESPIRATORY (INHALATION) at 15:17

## 2017-03-20 RX ADMIN — TAMSULOSIN HYDROCHLORIDE SCH MG: 0.4 CAPSULE ORAL at 08:18

## 2017-03-20 RX ADMIN — FUROSEMIDE SCH MG: 10 INJECTION, SOLUTION INTRAMUSCULAR; INTRAVENOUS at 08:19

## 2017-03-20 RX ADMIN — ACETYLCYSTEINE SCH MG: 100 INHALANT RESPIRATORY (INHALATION) at 08:48

## 2017-03-20 RX ADMIN — GUAIFENESIN AND DEXTROMETHORPHAN PRN ML: 100; 10 SYRUP ORAL at 09:19

## 2017-03-20 RX ADMIN — ALBUTEROL SULFATE PRN MG: 2.5 SOLUTION RESPIRATORY (INHALATION) at 08:48

## 2017-03-20 RX ADMIN — ATORVASTATIN CALCIUM SCH MG: 10 TABLET, FILM COATED ORAL at 08:18

## 2017-03-20 RX ADMIN — FERROUS SULFATE TAB 325 MG (65 MG ELEMENTAL FE) SCH MG: 325 (65 FE) TAB at 16:30

## 2017-03-20 RX ADMIN — IBUPROFEN SCH MG: 400 TABLET, FILM COATED ORAL at 12:13

## 2017-03-20 RX ADMIN — IBUPROFEN SCH MG: 400 TABLET, FILM COATED ORAL at 23:27

## 2017-03-20 RX ADMIN — SODIUM CHLORIDE SCH MLS/HR: 9 INJECTION, SOLUTION INTRAVENOUS at 14:48

## 2017-03-20 RX ADMIN — PIPERACILLIN SODIUM AND TAZOBACTAM SODIUM SCH MLS/HR: .5; 4 INJECTION, POWDER, LYOPHILIZED, FOR SOLUTION INTRAVENOUS at 06:50

## 2017-03-20 RX ADMIN — Medication SCH OZ: at 08:34

## 2017-03-20 RX ADMIN — TRAZODONE HYDROCHLORIDE SCH MG: 50 TABLET ORAL at 21:33

## 2017-03-20 RX ADMIN — PIPERACILLIN SODIUM AND TAZOBACTAM SODIUM SCH MLS/HR: .5; 4 INJECTION, POWDER, LYOPHILIZED, FOR SOLUTION INTRAVENOUS at 00:14

## 2017-03-20 RX ADMIN — PIPERACILLIN SODIUM AND TAZOBACTAM SODIUM SCH MLS/HR: .5; 4 INJECTION, POWDER, LYOPHILIZED, FOR SOLUTION INTRAVENOUS at 13:06

## 2017-03-20 RX ADMIN — DEXTROSE MONOHYDRATE SCH MLS/HR: 50 INJECTION, SOLUTION INTRAVENOUS at 04:24

## 2017-03-20 RX ADMIN — GUAIFENESIN AND DEXTROMETHORPHAN PRN ML: 100; 10 SYRUP ORAL at 15:47

## 2017-03-20 RX ADMIN — IBUPROFEN SCH MG: 400 TABLET, FILM COATED ORAL at 16:30

## 2017-03-20 RX ADMIN — PIPERACILLIN SODIUM AND TAZOBACTAM SODIUM SCH MLS/HR: .5; 4 INJECTION, POWDER, LYOPHILIZED, FOR SOLUTION INTRAVENOUS at 19:47

## 2017-03-20 NOTE — NUR
RN NOTE



NO SIGNIFICANT CHANGES THIS SHIFT. PT APPEARS COMFORTABLE, RESTING IN BED WITH EYES CLOSED. 
NO DISTRESS AT THIS TIME. VSS. ON NC O2 3L. IV INTACT AND PATENT, ABX HANGING. ALL NEEDS 
ATTENDED TO AT THIS TIME. WILL F/U WITH DAY SHIFR FOR BLAINE. 



HAD 2 BM

## 2017-03-20 NOTE — NUR
rn note



pt resting in bed with eyes closed. appears comfortable. no s/s of respiratory distress. 
will cont to monitor.

## 2017-03-20 NOTE — NUR
WOUND CARE CONSULT: PATIENT SEEN AND SKIN ASSESSMENT DONE. PATIENT ALERT, ORIENTED, ABLE TO 
TURN AND REPOSITION HOWEVER NEEDS ASSIST, INCONTINENT, MIMA 14, NURSING STAFF ORDERED 
ANGÉLICA ISOFLEX DEANNA BED AND WILL BE PLACED WHEN AVAILABLE IN THE UNIT. PATIENT HAS LOOSE 
BOWEL MOVEMENT TODAY. SEE SKIN ASSESSMENT IN PCS ALONG WITH RECOMMENDATIONS. RECOMMEND 
MOISTURE PROTECTION WITH Z GUARD AS ORDERED, PRESSURE PREVENTION MEASURES, TURN AND 
REPOSITION EVERY 2 HRS AS PATIENT CONDITION PERMITS, OFFLOAD BOTH HEELS. ALL DISCUSSED WITH 
NURSING STAFF. MD IN AGREEMENT WITH PLAN OF CARE.  

-------------------------------------------------------------------------------

Addendum: 03/20/17 at 0842 by LARRY ROSALES WNDNU

-------------------------------------------------------------------------------

Amended: Links added.

## 2017-03-20 NOTE — NUR
RN NOTE



PT AAOX3, NO C/O OF PAIN OR DISCOMFORT. BREATHING EVEN AND NON-LABORED, NO SOB AT THIS TIME. 
ON NC 3L. HOB ELEVATED 30 DEGREES. RAC INTACT AND PATENT. M/S STATUS WILL CONT TO MONITOR. 
CALL LIGHT IN REACH. PT MADE AWARE THAT WE NEED TO GET U/A.

## 2017-03-20 NOTE — NUR
MS/Rn: notes

Patient reassessed per Medical / Surgical protocol, no acute change noted from initial shift 
assessment. please see completed data in flowsheets.

patient a / o x 4, adequate oxygenation on 3 liter nasal cannula , saturation >95%, no 
shortness of breath noted, noted non productive cough, prn given for coughing. vss, 
afebrile.

patient turned and positioned q2h and prn.

wound consult done, change bed to [place] bed. keep grant heels offloads.

tolerated diet well, no nausea, vomiting noted. adequate urine output, + BM.

Will continue to monitor closely and intervene as appropriate.

## 2017-03-21 VITALS — DIASTOLIC BLOOD PRESSURE: 63 MMHG | SYSTOLIC BLOOD PRESSURE: 151 MMHG

## 2017-03-21 VITALS — SYSTOLIC BLOOD PRESSURE: 143 MMHG | DIASTOLIC BLOOD PRESSURE: 84 MMHG

## 2017-03-21 LAB
ANISOCYTOSIS BLD QL: (no result)
BASOPHILS # BLD AUTO: 0 /CMM (ref 0–0.2)
BASOPHILS NFR BLD AUTO: 0.1 % (ref 0–2)
BUN SERPL-MCNC: 13 MG/DL (ref 7–18)
CALCIUM SERPL-MCNC: 8.2 MG/DL (ref 8.5–10.1)
CHLORIDE SERPL-SCNC: 96 MMOL/L (ref 98–107)
CO2 SERPL-SCNC: 33 MMOL/L (ref 21–32)
CREAT SERPL-MCNC: 1 MG/DL (ref 0.6–1.3)
EOSINOPHIL # BLD AUTO: 0 /CMM (ref 0–0.7)
EOSINOPHIL NFR BLD AUTO: 0.1 % (ref 0–6)
GLUCOSE SERPL-MCNC: 106 MG/DL (ref 74–106)
HCT VFR BLD AUTO: 30 % (ref 39–51)
HGB BLD-MCNC: 9.6 G/DL (ref 13.5–17.5)
LYMPHOCYTES NFR BLD AUTO: 0.8 /CMM (ref 0.8–4.8)
LYMPHOCYTES NFR BLD AUTO: 8.9 % (ref 20–44)
LYMPHOCYTES NFR BLD MANUAL: 13 % (ref 16–48)
MCH RBC QN AUTO: 26 PG (ref 26–33)
MCHC RBC AUTO-ENTMCNC: 32 G/DL (ref 31–36)
MCV RBC AUTO: 81 FL (ref 80–96)
MONOCYTES NFR BLD AUTO: 1.4 /CMM (ref 0.1–1.3)
MONOCYTES NFR BLD AUTO: 15.5 % (ref 2–12)
MONOCYTES NFR BLD MANUAL: 8 % (ref 0–11)
NEUTROPHILS # BLD AUTO: 6.9 /CMM (ref 1.8–8.9)
NEUTROPHILS NFR BLD AUTO: 75.4 % (ref 43–81)
NEUTS SEG NFR BLD MANUAL: 79 % (ref 42–76)
PLATELET # BLD AUTO: 241 /CMM (ref 150–450)
PLATELET BLD QL SMEAR: ADEQUATE
POTASSIUM SERPL-SCNC: 3 MMOL/L (ref 3.5–5.1)
RBC # BLD AUTO: 3.68 MIL/UL (ref 4.5–6)
RDW COEFFICIENT OF VARIATION: 16.7 (ref 11.5–15)
SODIUM SERPL-SCNC: 135 MMOL/L (ref 136–145)
WBC NRBC COR # BLD AUTO: 9.1 K/UL (ref 4.3–11)

## 2017-03-21 RX ADMIN — ACETYLCYSTEINE SCH MG: 100 INHALANT RESPIRATORY (INHALATION) at 15:24

## 2017-03-21 RX ADMIN — PANTOPRAZOLE SODIUM SCH MG: 40 TABLET, DELAYED RELEASE ORAL at 07:34

## 2017-03-21 RX ADMIN — Medication SCH OZ: at 09:00

## 2017-03-21 RX ADMIN — SODIUM CHLORIDE SCH MLS/HR: 9 INJECTION, SOLUTION INTRAVENOUS at 13:51

## 2017-03-21 RX ADMIN — FERROUS SULFATE TAB 325 MG (65 MG ELEMENTAL FE) SCH MG: 325 (65 FE) TAB at 08:12

## 2017-03-21 RX ADMIN — PIPERACILLIN SODIUM AND TAZOBACTAM SODIUM SCH MLS/HR: .5; 4 INJECTION, POWDER, LYOPHILIZED, FOR SOLUTION INTRAVENOUS at 06:18

## 2017-03-21 RX ADMIN — DEXTROSE MONOHYDRATE SCH MLS/HR: 50 INJECTION, SOLUTION INTRAVENOUS at 05:05

## 2017-03-21 RX ADMIN — PIPERACILLIN SODIUM AND TAZOBACTAM SODIUM SCH MLS/HR: .5; 4 INJECTION, POWDER, LYOPHILIZED, FOR SOLUTION INTRAVENOUS at 01:43

## 2017-03-21 RX ADMIN — FUROSEMIDE SCH MG: 10 INJECTION, SOLUTION INTRAMUSCULAR; INTRAVENOUS at 08:12

## 2017-03-21 RX ADMIN — PIPERACILLIN SODIUM AND TAZOBACTAM SODIUM SCH MLS/HR: .5; 4 INJECTION, POWDER, LYOPHILIZED, FOR SOLUTION INTRAVENOUS at 12:05

## 2017-03-21 RX ADMIN — DEXTROSE MONOHYDRATE PRN MG: 50 INJECTION, SOLUTION INTRAVENOUS at 08:54

## 2017-03-21 RX ADMIN — FERROUS SULFATE TAB 325 MG (65 MG ELEMENTAL FE) SCH MG: 325 (65 FE) TAB at 16:10

## 2017-03-21 RX ADMIN — IBUPROFEN SCH MG: 400 TABLET, FILM COATED ORAL at 11:22

## 2017-03-21 RX ADMIN — ACETYLCYSTEINE SCH MG: 100 INHALANT RESPIRATORY (INHALATION) at 07:04

## 2017-03-21 RX ADMIN — ATORVASTATIN CALCIUM SCH MG: 10 TABLET, FILM COATED ORAL at 08:12

## 2017-03-21 RX ADMIN — GUAIFENESIN AND DEXTROMETHORPHAN PRN ML: 100; 10 SYRUP ORAL at 03:36

## 2017-03-21 RX ADMIN — ALBUTEROL SULFATE PRN MG: 2.5 SOLUTION RESPIRATORY (INHALATION) at 07:04

## 2017-03-21 RX ADMIN — THERA TABS SCH UDTAB: TAB at 08:12

## 2017-03-21 RX ADMIN — Medication SCH EACH: at 16:10

## 2017-03-21 RX ADMIN — Medication SCH EACH: at 08:11

## 2017-03-21 RX ADMIN — IBUPROFEN SCH MG: 400 TABLET, FILM COATED ORAL at 05:07

## 2017-03-21 RX ADMIN — TAMSULOSIN HYDROCHLORIDE SCH MG: 0.4 CAPSULE ORAL at 08:12

## 2017-03-21 RX ADMIN — FLUOXETINE HYDROCHLORIDE SCH MG: 20 CAPSULE ORAL at 08:12

## 2017-03-21 RX ADMIN — ENOXAPARIN SODIUM SCH MG: 40 INJECTION SUBCUTANEOUS at 08:14

## 2017-03-21 RX ADMIN — IBUPROFEN SCH MG: 400 TABLET, FILM COATED ORAL at 16:10

## 2017-03-21 RX ADMIN — Medication PRN OZ: at 11:23

## 2017-03-21 RX ADMIN — ALUMINUM HYDROXIDE, MAGNESIUM HYDROXIDE, AND SIMETHICONE PRN ML: 200; 200; 20 SUSPENSION ORAL at 13:53

## 2017-03-21 RX ADMIN — GUAIFENESIN AND DEXTROMETHORPHAN PRN ML: 100; 10 SYRUP ORAL at 16:10

## 2017-03-21 RX ADMIN — Medication PRN OZ: at 08:54

## 2017-03-21 RX ADMIN — DEXTROSE MONOHYDRATE SCH MLS/HR: 50 INJECTION, SOLUTION INTRAVENOUS at 16:11

## 2017-03-21 NOTE — NUR
MS/RN: notes

room air saturation checked, 93%, patient's condition stable, denies chest pain, no 
shortness of breath noted.

## 2017-03-21 NOTE — NUR
MS/RN: discharge

All discharge instruction gave to patient, verbalize understanding, patient's belongings: 
cell phone, , shoes, pants, t-shirt are with patient. IV h/l removed, no bleeding 
noted, wrist band removed. patient denies pain, no shortness of breath noted, condition 
stable.

patient is picked up by ambulance via gurney with stable condition.

-------------------------------------------------------------------------------

Addendum: 03/21/17 at 1903 by FAINA BARFIELD RN

-------------------------------------------------------------------------------

Notes: 

discharge picture taken.

## 2017-03-21 NOTE — NUR
ms/rn: notes

report gave to RN supervisor Adilene at [facility], tel: [phone]. Notified that 
patient has cash $600, and missing Pajamas, pants and keys. 

Charge nurse made aware patient missing items, copy of the patient's belongings form given 
to charge nurse. 

-------------------------------------------------------------------------------

Addendum: 03/21/17 at 1907 by FAINA BARFIELD RN

-------------------------------------------------------------------------------

notes:

discard the above notes, wrong entry.

## 2017-03-21 NOTE — NUR
RN NOTE



NO SIGNIFICANT CHANGES THIS SHIFT. NO DISTRESS AT THIS TIME. BREATHING EVEN AND NON-LABORED 
ON NC 4L. NO SOB NOTED. IV INTACT AND PATENT, ABX INFUSING. KEPT CLEAN AND DRY, REPOSITIONED 
Q2HRS. ON ANGÉLICA MATTRESS. CALL LIGHT IN REACH, WILL F/U WITH DAY SHIFT FOR BLAINE.

## 2017-05-04 ENCOUNTER — HOSPITAL ENCOUNTER (INPATIENT)
Dept: HOSPITAL 54 - ER | Age: 77
LOS: 2 days | Discharge: NURSING, REHAB OR LONG-TERM CARE | DRG: 291 | End: 2017-05-06
Attending: INTERNAL MEDICINE | Admitting: INTERNAL MEDICINE
Payer: COMMERCIAL

## 2017-05-04 VITALS — DIASTOLIC BLOOD PRESSURE: 63 MMHG | SYSTOLIC BLOOD PRESSURE: 115 MMHG

## 2017-05-04 VITALS — DIASTOLIC BLOOD PRESSURE: 89 MMHG | SYSTOLIC BLOOD PRESSURE: 142 MMHG

## 2017-05-04 VITALS — WEIGHT: 183 LBS | BODY MASS INDEX: 30.49 KG/M2 | HEIGHT: 65 IN

## 2017-05-04 VITALS — DIASTOLIC BLOOD PRESSURE: 61 MMHG | SYSTOLIC BLOOD PRESSURE: 118 MMHG

## 2017-05-04 VITALS — SYSTOLIC BLOOD PRESSURE: 134 MMHG | DIASTOLIC BLOOD PRESSURE: 60 MMHG

## 2017-05-04 VITALS — SYSTOLIC BLOOD PRESSURE: 104 MMHG | DIASTOLIC BLOOD PRESSURE: 60 MMHG

## 2017-05-04 DIAGNOSIS — I25.10: ICD-10-CM

## 2017-05-04 DIAGNOSIS — R32: ICD-10-CM

## 2017-05-04 DIAGNOSIS — Z66: ICD-10-CM

## 2017-05-04 DIAGNOSIS — I24.1: ICD-10-CM

## 2017-05-04 DIAGNOSIS — E78.5: ICD-10-CM

## 2017-05-04 DIAGNOSIS — Z96.643: ICD-10-CM

## 2017-05-04 DIAGNOSIS — F32.9: ICD-10-CM

## 2017-05-04 DIAGNOSIS — N40.0: ICD-10-CM

## 2017-05-04 DIAGNOSIS — G82.20: ICD-10-CM

## 2017-05-04 DIAGNOSIS — Z98.1: ICD-10-CM

## 2017-05-04 DIAGNOSIS — G62.9: ICD-10-CM

## 2017-05-04 DIAGNOSIS — I25.2: ICD-10-CM

## 2017-05-04 DIAGNOSIS — J96.01: ICD-10-CM

## 2017-05-04 DIAGNOSIS — Z95.1: ICD-10-CM

## 2017-05-04 DIAGNOSIS — D64.9: ICD-10-CM

## 2017-05-04 DIAGNOSIS — I50.33: ICD-10-CM

## 2017-05-04 DIAGNOSIS — I11.0: Primary | ICD-10-CM

## 2017-05-04 LAB
APTT PPP: 26 SEC (ref 23–34)
BASOPHILS # BLD AUTO: 0.1 /CMM (ref 0–0.2)
BASOPHILS NFR BLD AUTO: 0.8 % (ref 0–2)
BUN SERPL-MCNC: 16 MG/DL (ref 7–18)
CALCIUM SERPL-MCNC: 8.2 MG/DL (ref 8.5–10.1)
CHLORIDE SERPL-SCNC: 103 MMOL/L (ref 98–107)
CO2 SERPL-SCNC: 25 MMOL/L (ref 21–32)
CREAT SERPL-MCNC: 1 MG/DL (ref 0.6–1.3)
DEPRECATED POLYS # FLD: 5 % (ref 0–25)
EOSINOPHIL # BLD AUTO: 0 /CMM (ref 0–0.7)
EOSINOPHIL NFR BLD AUTO: 0 % (ref 0–6)
GLUCOSE FLD-MCNC: 126 MG/DL
GLUCOSE SERPL-MCNC: 107 MG/DL (ref 74–106)
HCT VFR BLD AUTO: 33 % (ref 39–51)
HGB BLD-MCNC: 10.7 G/DL (ref 13.5–17.5)
INR PPP: 1.06 (ref 0.87–1.13)
LYMPHOCYTES NFR BLD AUTO: 1 /CMM (ref 0.8–4.8)
LYMPHOCYTES NFR BLD AUTO: 9.2 % (ref 20–44)
MCH RBC QN AUTO: 27 PG (ref 26–33)
MCHC RBC AUTO-ENTMCNC: 33 G/DL (ref 31–36)
MCV RBC AUTO: 82 FL (ref 80–96)
MONOCYTES # FLD: 1 %
MONOCYTES NFR BLD AUTO: 1.2 /CMM (ref 0.1–1.3)
MONOCYTES NFR BLD AUTO: 11.2 % (ref 2–12)
NEUTROPHILS # BLD AUTO: 8.4 /CMM (ref 1.8–8.9)
NEUTROPHILS NFR BLD AUTO: 78.8 % (ref 43–81)
NT-PROBNP SERPL-MCNC: 846 PG/ML (ref 0–125)
PLATELET # BLD AUTO: 268 /CMM (ref 150–450)
POTASSIUM SERPL-SCNC: 3.9 MMOL/L (ref 3.5–5.1)
PROT FLD-MCNC: 6.2 G/DL
PROTHROMBIN TIME: 11.4 SECS (ref 9.5–12.7)
RBC # BLD AUTO: 3.97 MIL/UL (ref 4.5–6)
RDW COEFFICIENT OF VARIATION: 20.4 (ref 11.5–15)
SODIUM SERPL-SCNC: 137 MMOL/L (ref 136–145)
SPECIMEN VOL FLD: 1300 ML
TROPONIN I SERPL-MCNC: < 0.017 NG/ML (ref 0–0.06)
WBC # FLD: 1380 /CU. MM. (ref 0–200)
WBC NRBC COR # BLD AUTO: 10.6 K/UL (ref 4.3–11)

## 2017-05-04 PROCEDURE — 0W9B3ZZ DRAINAGE OF LEFT PLEURAL CAVITY, PERCUTANEOUS APPROACH: ICD-10-PCS

## 2017-05-04 PROCEDURE — Z7610: HCPCS

## 2017-05-04 PROCEDURE — A4606 OXYGEN PROBE USED W OXIMETER: HCPCS

## 2017-05-04 RX ADMIN — ONDANSETRON ONE MG: 4 TABLET, ORALLY DISINTEGRATING ORAL at 03:40

## 2017-05-04 RX ADMIN — FLUOXETINE HYDROCHLORIDE SCH MG: 20 CAPSULE ORAL at 08:46

## 2017-05-04 RX ADMIN — ONDANSETRON ONE MG: 4 TABLET, ORALLY DISINTEGRATING ORAL at 03:44

## 2017-05-04 RX ADMIN — Medication SCH MG: at 17:06

## 2017-05-04 RX ADMIN — PANTOPRAZOLE SODIUM SCH MG: 40 TABLET, DELAYED RELEASE ORAL at 08:46

## 2017-05-04 RX ADMIN — ASPIRIN 81 MG SCH MG: 81 TABLET ORAL at 21:10

## 2017-05-04 RX ADMIN — FERROUS SULFATE TAB 325 MG (65 MG ELEMENTAL FE) SCH MG: 325 (65 FE) TAB at 17:00

## 2017-05-04 RX ADMIN — TRAZODONE HYDROCHLORIDE SCH MG: 50 TABLET ORAL at 21:10

## 2017-05-04 RX ADMIN — FUROSEMIDE SCH MG: 10 INJECTION, SOLUTION INTRAMUSCULAR; INTRAVENOUS at 12:45

## 2017-05-04 RX ADMIN — FERROUS SULFATE TAB 325 MG (65 MG ELEMENTAL FE) SCH MG: 325 (65 FE) TAB at 08:47

## 2017-05-04 RX ADMIN — TAMSULOSIN HYDROCHLORIDE SCH MG: 0.4 CAPSULE ORAL at 08:47

## 2017-05-04 RX ADMIN — INDOMETHACIN SCH MG: 25 CAPSULE ORAL at 17:00

## 2017-05-04 RX ADMIN — FUROSEMIDE SCH MG: 10 INJECTION, SOLUTION INTRAMUSCULAR; INTRAVENOUS at 17:00

## 2017-05-04 RX ADMIN — THERA TABS SCH UDTAB: TAB at 08:48

## 2017-05-04 RX ADMIN — ATORVASTATIN CALCIUM SCH MG: 10 TABLET, FILM COATED ORAL at 08:49

## 2017-05-04 NOTE — NUR
RN INITIAL NOTE  

PT ON THE BED RESTING WITHOUT ANY DISTRESS , A/O X4.  BREATHING EVEN AND UNLABORED ON 2 LPM 
VIA NC.  PERIPHERAL IV ON LH 20 G INTACT AND PATENT . VERBALIZED TOLERABLE  PAIN AT THIS 
TIME . S/P THORACOCENTESIS , WILL MONITOR FOR CHEST PAIN AND SOB. INCONTINENT FOR 
BOWEL/BLADDER. BED IN THE LOWEST/LOCKED POSITION. CALL LIGHT WITHIN REACH. WILL CONTINUE TO 
MONITOR .

## 2017-05-04 NOTE — NUR
MS RN NOTE

 CALLED TO DR STEVENS NOTIFIED THAT PATIENT REFUSED TO HAVE OTHER PAIN MEDS ZOFRAN IVP GIVEN 
AS ORDERED WILL F\U

-------------------------------------------------------------------------------

Addendum: 05/04/17 at 1707 by STEPHANIE VILLEGAS RN

-------------------------------------------------------------------------------

 PER DR STEVENS  HAVE ORDER TO D\C  TRAMADOL

## 2017-05-04 NOTE — NUR
MS RN NOTE 

C\O NAUSEA AFTER TAKEN TRAMADOL, SPOKE WITH DR STEVENS PAIN MANAGEMENT STATED OK TO ORDER 
OXYCODAN ,SPOKE WITH PATIENT ABOUT OXYCODONE STATED NO OTHER PAIN MEDS

## 2017-05-04 NOTE — NUR
TELER NOTE\

 SEEN BY DR SHELTON NOTIFIED THAT PATIENT DONT HAVE DVT CHEMIOCAL PROPHYLAXIS, STATED 
AFTER THORACENTESIS CHAVA BOWERS

## 2017-05-04 NOTE — NUR
TELE RN NOTE

PATIENT IN BED,ALL NEEDS ATTENDED ,ON 2LNCSAT 97% ,BED IN LOWEST AND LOCKED POSITION,HL LT 
HAND INTACT, NO S\S  INFECTION NO SOB NOTED CONSENT DONE US THORACENTESES OBTAINED AS 
ORDERED,  HAS BREAKFAST ABLE EAT 50%FED BY STUFF WILL CONT TO MONITOR CLOSELY ,PLAN O F CARE 
DISCUSSED WITH PATIENT

## 2017-05-04 NOTE — NUR
TELE RN INITIAL NOTE

RECEIVED REPORT FROM KETAN BORGES. PT A/A/O X4. CAME FROM [facility]. PT 
IS WHEELCHAIR BOUND. INCONTINENT. LEFT BUTTOCKS SCAB, COVERED WITH MEPILEX. BLE EDEMA +3 
EDEMA. NO COMPLAINTS OF PAIN. 2L NC. LEFT SIDED LUNG SOUNDS ABSENT. PT HAS WEAK COUGH. IV 
PATENT AND INTACT. BED IN LOW LOCKED POSITION WILL CONTINUE TO MONITOR. CALL LIGHT WITHIN 
REACH.

## 2017-05-04 NOTE — NUR
MS  RN RN NOTE 

SPOKE WITH DR STEVENS PAIN MANAGEMENT, NOTIFIED THAT PATIENT REFUSING TO HAVE PAMELOR , WANTS 
TO D\C THIS MEDS PER DR CRUZ NO D\C AT THIS TIME , WILL  CONT TO MONITOR CLOSELY 
,EXPLAINED OF IMPORTANCE OF MEDICATION BUT STILL REFUSED STATED IT MAY GIVE HIM NAUSEA

## 2017-05-04 NOTE — NUR
PT BIBRA FOR "SOB X3 DAYS; WORSE NOW"; DENIES CP. PT AOX4 RR EVEN AND 
UNLABORED. NO SOB NOTED. NAD NOTED. NO NVD AT THIS TIME. PT NOT DIAPHORETIC. PT 
GOWNED AND PLACED ON MONITOR WAITING FOR MD LOCKE.

## 2017-05-04 NOTE — NUR
tele rn note 

 spoke with dr johnson about inserting Branham  to monitor urine out put ,stated no at this 
time

## 2017-05-04 NOTE — NUR
TELE RN NOTE

  TELE D\C AS ORDERED.PAIN MANAGEMENT DOCTOR  AT BEDSIDE NEW ORDER GIVEN TRAMADOL GIVEN AS 
ORDERED, WILL F\U

## 2017-05-04 NOTE — NUR
tele rn note

seen by dr lombardi  aware that patient has order thoracentesis ,,seen and examined patient

## 2017-05-04 NOTE — NUR
TELE RN

LEFT SIDED LUNG SOUNDS ABSENT. NP YOLANDA NOTIFIED. WILL CONTINUE TO MONITOR SATURATION AND PT 
CONDITION.

## 2017-05-04 NOTE — NUR
ms rn note 

patient stated that feels a little better with abdominal discomfort, will endorse next shift 
for  followup

## 2017-05-05 VITALS — SYSTOLIC BLOOD PRESSURE: 106 MMHG | DIASTOLIC BLOOD PRESSURE: 66 MMHG

## 2017-05-05 VITALS — SYSTOLIC BLOOD PRESSURE: 100 MMHG | DIASTOLIC BLOOD PRESSURE: 45 MMHG

## 2017-05-05 VITALS — SYSTOLIC BLOOD PRESSURE: 100 MMHG | DIASTOLIC BLOOD PRESSURE: 54 MMHG

## 2017-05-05 LAB
ALBUMIN SERPL BCP-MCNC: 2.9 G/DL (ref 3.4–5)
ALP SERPL-CCNC: 86 U/L (ref 46–116)
ALT SERPL W P-5'-P-CCNC: 20 U/L (ref 12–78)
ANISOCYTOSIS BLD QL: (no result)
AST SERPL W P-5'-P-CCNC: 18 U/L (ref 15–37)
BASOPHILS # BLD AUTO: 0 /CMM (ref 0–0.2)
BASOPHILS NFR BLD AUTO: 0.1 % (ref 0–2)
BILIRUB SERPL-MCNC: 0.4 MG/DL (ref 0.2–1)
BUN SERPL-MCNC: 13 MG/DL (ref 7–18)
CALCIUM SERPL-MCNC: 8.1 MG/DL (ref 8.5–10.1)
CHLORIDE SERPL-SCNC: 99 MMOL/L (ref 98–107)
CHOLEST SERPL-MCNC: 105 MG/DL (ref ?–200)
CO2 SERPL-SCNC: 30 MMOL/L (ref 21–32)
CREAT SERPL-MCNC: 1.1 MG/DL (ref 0.6–1.3)
EOSINOPHIL # BLD AUTO: 0 /CMM (ref 0–0.7)
EOSINOPHIL NFR BLD AUTO: 0.2 % (ref 0–6)
GLUCOSE SERPL-MCNC: 125 MG/DL (ref 74–106)
HCT VFR BLD AUTO: 36 % (ref 39–51)
HDLC SERPL-MCNC: 41 MG/DL (ref 40–60)
HGB BLD-MCNC: 12.1 G/DL (ref 13.5–17.5)
HYPOCHROMIA BLD QL: (no result)
LDLC SERPL DIRECT ASSAY-MCNC: 53 MG/DL (ref 0–99)
LYMPHOCYTES NFR BLD AUTO: 0.8 /CMM (ref 0.8–4.8)
LYMPHOCYTES NFR BLD AUTO: 7.6 % (ref 20–44)
LYMPHOCYTES NFR BLD MANUAL: 6 % (ref 16–48)
MAGNESIUM SERPL-MCNC: 2 MG/DL (ref 1.8–2.4)
MCH RBC QN AUTO: 27 PG (ref 26–33)
MCHC RBC AUTO-ENTMCNC: 33 G/DL (ref 31–36)
MCV RBC AUTO: 81 FL (ref 80–96)
MONOCYTES NFR BLD AUTO: 1.6 /CMM (ref 0.1–1.3)
MONOCYTES NFR BLD AUTO: 15.1 % (ref 2–12)
MONOCYTES NFR BLD MANUAL: 9 % (ref 0–11)
NEUTROPHILS # BLD AUTO: 8.4 /CMM (ref 1.8–8.9)
NEUTROPHILS NFR BLD AUTO: 77 % (ref 43–81)
NEUTS SEG NFR BLD MANUAL: 85 % (ref 42–76)
PHOSPHATE SERPL-MCNC: 3.6 MG/DL (ref 2.5–4.9)
PLATELET # BLD AUTO: 266 /CMM (ref 150–450)
PLATELET BLD QL SMEAR: ADEQUATE
POTASSIUM SERPL-SCNC: 3.6 MMOL/L (ref 3.5–5.1)
PROT SERPL-MCNC: 7.3 G/DL (ref 6.4–8.2)
RBC # BLD AUTO: 4.48 MIL/UL (ref 4.5–6)
RDW COEFFICIENT OF VARIATION: 20.4 (ref 11.5–15)
SODIUM SERPL-SCNC: 136 MMOL/L (ref 136–145)
TRIGL SERPL-MCNC: 72 MG/DL (ref 30–150)
TSH SERPL DL<=0.005 MIU/L-ACNC: 1.22 UIU/ML (ref 0.36–3.74)
WBC NRBC COR # BLD AUTO: 10.9 K/UL (ref 4.3–11)

## 2017-05-05 RX ADMIN — TRAZODONE HYDROCHLORIDE SCH MG: 50 TABLET ORAL at 21:03

## 2017-05-05 RX ADMIN — LISINOPRIL SCH MG: 5 TABLET ORAL at 09:00

## 2017-05-05 RX ADMIN — FLUOXETINE HYDROCHLORIDE SCH MG: 20 CAPSULE ORAL at 09:20

## 2017-05-05 RX ADMIN — INDOMETHACIN SCH MG: 25 CAPSULE ORAL at 09:23

## 2017-05-05 RX ADMIN — FERROUS SULFATE TAB 325 MG (65 MG ELEMENTAL FE) SCH MG: 325 (65 FE) TAB at 09:23

## 2017-05-05 RX ADMIN — PANTOPRAZOLE SODIUM SCH MG: 40 TABLET, DELAYED RELEASE ORAL at 09:20

## 2017-05-05 RX ADMIN — ATORVASTATIN CALCIUM SCH MG: 10 TABLET, FILM COATED ORAL at 09:23

## 2017-05-05 RX ADMIN — TAMSULOSIN HYDROCHLORIDE SCH MG: 0.4 CAPSULE ORAL at 09:24

## 2017-05-05 RX ADMIN — INDOMETHACIN SCH MG: 25 CAPSULE ORAL at 18:41

## 2017-05-05 RX ADMIN — Medication SCH MG: at 21:03

## 2017-05-05 RX ADMIN — ASPIRIN 81 MG SCH MG: 81 TABLET ORAL at 21:03

## 2017-05-05 RX ADMIN — FERROUS SULFATE TAB 325 MG (65 MG ELEMENTAL FE) SCH MG: 325 (65 FE) TAB at 18:41

## 2017-05-05 RX ADMIN — THERA TABS SCH UDTAB: TAB at 09:19

## 2017-05-05 NOTE — NUR
RN EOS NOTE;

NO ANY DISTRESS NOTED DURING THE SHIFT, ALL NEEDS ATTENDED PROMPTLY, KEPT CLEAN AND DRY , 
CALL LIGHT WITHIN REACH, ENDORSED TO DAY SHIFT RN FOR CONTINUITY OF CARE.

## 2017-05-05 NOTE — NUR
MS-1/LVN



PT COMPLAINT OF CONSTIPATION x3-4 DAYS. YOLANDA ACNP CALLED 1 TIME ORDER FLEETS ENEMA ORDERED 
IF STILL NO BM TONIGHT. ALSO ORDERED AMBIEN 5MG QHS PRN. WILL CONTINUE TO MONITOR.

## 2017-05-06 VITALS — DIASTOLIC BLOOD PRESSURE: 65 MMHG | SYSTOLIC BLOOD PRESSURE: 109 MMHG

## 2017-05-06 VITALS — SYSTOLIC BLOOD PRESSURE: 133 MMHG | DIASTOLIC BLOOD PRESSURE: 74 MMHG

## 2017-05-06 VITALS — SYSTOLIC BLOOD PRESSURE: 110 MMHG | DIASTOLIC BLOOD PRESSURE: 60 MMHG

## 2017-05-06 RX ADMIN — LISINOPRIL SCH MG: 5 TABLET ORAL at 09:53

## 2017-05-06 RX ADMIN — PANTOPRAZOLE SODIUM SCH MG: 40 TABLET, DELAYED RELEASE ORAL at 09:51

## 2017-05-06 RX ADMIN — ATORVASTATIN CALCIUM SCH MG: 10 TABLET, FILM COATED ORAL at 09:51

## 2017-05-06 RX ADMIN — THERA TABS SCH UDTAB: TAB at 09:53

## 2017-05-06 RX ADMIN — FERROUS SULFATE TAB 325 MG (65 MG ELEMENTAL FE) SCH MG: 325 (65 FE) TAB at 09:00

## 2017-05-06 RX ADMIN — FLUOXETINE HYDROCHLORIDE SCH MG: 20 CAPSULE ORAL at 09:52

## 2017-05-06 RX ADMIN — INDOMETHACIN SCH MG: 25 CAPSULE ORAL at 09:52

## 2017-05-06 RX ADMIN — TAMSULOSIN HYDROCHLORIDE SCH MG: 0.4 CAPSULE ORAL at 09:53

## 2017-05-06 NOTE — NUR
PENDING D/C TO [facility]...BM X 2 THIS SHIFT...IV D/'C...PT IN STABLE 
CONDITION...RX FOR BUMEX RCVD...REPORT GIVEN TO ESTEBAN.

## 2017-05-27 ENCOUNTER — HOSPITAL ENCOUNTER (EMERGENCY)
Dept: HOSPITAL 54 - ER | Age: 77
Discharge: HOME | End: 2017-05-27
Payer: MEDICARE

## 2017-05-27 VITALS — HEIGHT: 65 IN | WEIGHT: 180 LBS | BODY MASS INDEX: 29.99 KG/M2

## 2017-05-27 VITALS — DIASTOLIC BLOOD PRESSURE: 72 MMHG | SYSTOLIC BLOOD PRESSURE: 120 MMHG

## 2017-05-27 DIAGNOSIS — L89.319: Primary | ICD-10-CM

## 2017-05-27 DIAGNOSIS — I10: ICD-10-CM

## 2017-05-27 DIAGNOSIS — Z79.82: ICD-10-CM

## 2017-05-27 DIAGNOSIS — Z95.1: ICD-10-CM

## 2017-05-27 PROCEDURE — A4606 OXYGEN PROBE USED W OXIMETER: HCPCS

## 2017-05-27 PROCEDURE — Z7610: HCPCS

## 2017-05-27 NOTE — NUR
[facility]  here for wound check. was supposed to go to 
the wound center for a check. posseble pressure ulcer approx 3-4 cm diameter. 
denies pain. stable vitals. awaiting md cortez.

## 2018-02-21 NOTE — NUR
Chief Complaint   Patient presents with   • Contraception     Discuss birth control       SUBJECTIVE:  Erica Cruz is a 22 year old female who presents to clinic today for discussion about contraception options. She is currently dating Dillon since October 2017. They are sexually active and using condoms. She would like to get back on OCP. She had previously been on jonathan and tolerated it well. She is not a smoker. No FH of blood clots.    ROS:Please see above. Denies chest pain, shortness of breath or lightheadedness.     ALLERGIES AND CURRENT MEDICATIONS REVIEWED AND UPDATED ON [facility].    OBJECTIVE:  Visit Vitals  /60 (BP Location: [facility], Patient Position: Sitting, Cuff Size: Regular)   Pulse 80   Temp 98.2 °F (36.8 °C) (Oral)   Resp 16   Ht 5' 5\" (1.651 m)   Wt 55.5 kg   LMP 02/11/2018   BMI 20.36 kg/m²     General: Appears healthy, well developed and well nourished female, in no acute distress.  CV: Regular rate and rhythm. No murmurs.   Respiratory: Normal respiratory effort. Clear to auscultation bilaterally.   Extremities:no edema  Psych: A&Ox3, mood and affect appropriate.    ASSESSMENT:  1. Encounter for initial prescription of contraceptive pills        PLAN:  1.Medications-contraception options d/w her. She would like to restart jonathan-rx sent  2.Labs-none today  3. Safe sexual practices d/w her.    Appropriate use and side effects of medication discussed with patient.  All questions answered and patient is agreeable to this plan.  Refer to after visit summary.  Follow up: 4 months  Instructed to return to clinic or call if symptoms are not resolved as discussed, or sooner if worse.    Renata Kincaid M.D.       PT C/O CONTIPATION AND FEELING FULL...MOM ALONG WITH PRUNE JUICE GIVEN  WITH NO 
RESULTS...HOSPITALIST

INFORMED AND RCVD NEW ORDERS FOR MIRELAX

## 2019-05-31 ENCOUNTER — OFFICE (OUTPATIENT)
Dept: URBAN - METROPOLITAN AREA CLINIC 57 | Facility: CLINIC | Age: 79
End: 2019-05-31

## 2019-05-31 VITALS
HEIGHT: 66 IN | RESPIRATION RATE: 16 BRPM | HEART RATE: 78 BPM | SYSTOLIC BLOOD PRESSURE: 98 MMHG | DIASTOLIC BLOOD PRESSURE: 60 MMHG | TEMPERATURE: 98.7 F | WEIGHT: 130 LBS

## 2019-05-31 DIAGNOSIS — R10.13 EPIGASTRIC PAIN: ICD-10-CM

## 2019-05-31 DIAGNOSIS — Z99.3 WHEELCHAIR BOUND: ICD-10-CM

## 2019-05-31 DIAGNOSIS — E78.5 HYPERLIPIDEMIA, UNSPECIFIED: ICD-10-CM

## 2019-05-31 DIAGNOSIS — E11.9 DIABETES MELLITUS: ICD-10-CM

## 2019-05-31 DIAGNOSIS — R52 CHRONIC PAIN: ICD-10-CM

## 2019-05-31 PROCEDURE — 99204 OFFICE O/P NEW MOD 45 MIN: CPT | Performed by: INTERNAL MEDICINE

## 2019-05-31 NOTE — SERVICEHPINOTES
The patient is a 78-year-old gentleman who comes in today because of epigastric pain for the past month. He does suffer from chronic pain. He has had numerous back surgeries. In addition he had a neck surgery 4 years ago which made him use a wheelchair most of the time. Currently he describes chronic pain in his pelvis and penis. Despite seeing numerous specialists, no specific etiology has been found. Nonetheless one month ago he was given tramadol. Since then he notes epigastric pain daily but episodically. He was given Prilosec and is taking this twice daily. Despite this the epigastric burning continuous. It is 5 out of 10 in severity. There is no radiation of the pain. It is never awoken her from sleep. There may be some relief from Tums as well. Recent laboratory data from May 15 shows white count 8.1 hemoglobin 10.5 hematocrit 31 platelets 211 AST 21 ALT 19 alkaline phosphatase 100. The patient has been chronically on 325 mg of aspirin.

## 2019-06-05 ENCOUNTER — AMBULATORY SURGICAL CENTER (OUTPATIENT)
Dept: URBAN - METROPOLITAN AREA SURGERY 34 | Facility: SURGERY | Age: 79
End: 2019-06-05

## 2019-06-05 VITALS
WEIGHT: 180 LBS | HEART RATE: 76 BPM | DIASTOLIC BLOOD PRESSURE: 49 MMHG | TEMPERATURE: 98.2 F | RESPIRATION RATE: 14 BRPM | OXYGEN SATURATION: 97 % | SYSTOLIC BLOOD PRESSURE: 108 MMHG | HEIGHT: 66 IN

## 2019-06-05 DIAGNOSIS — K44.9 DIAPHRAGMATIC HERNIA WITHOUT OBSTRUCTION OR GANGRENE: ICD-10-CM

## 2019-06-05 DIAGNOSIS — K31.7 POLYP OF STOMACH AND DUODENUM: ICD-10-CM

## 2019-06-05 PROCEDURE — 43239 EGD BIOPSY SINGLE/MULTIPLE: CPT | Performed by: INTERNAL MEDICINE

## 2019-07-12 ENCOUNTER — OFFICE (OUTPATIENT)
Dept: URBAN - METROPOLITAN AREA CLINIC 57 | Facility: CLINIC | Age: 79
End: 2019-07-12

## 2019-07-12 VITALS
WEIGHT: 180 LBS | HEIGHT: 66 IN | RESPIRATION RATE: 16 BRPM | DIASTOLIC BLOOD PRESSURE: 68 MMHG | SYSTOLIC BLOOD PRESSURE: 110 MMHG | HEART RATE: 74 BPM | TEMPERATURE: 97.7 F

## 2019-07-12 DIAGNOSIS — R10.13 EPIGASTRIC PAIN: ICD-10-CM

## 2019-07-12 PROCEDURE — 99213 OFFICE O/P EST LOW 20 MIN: CPT | Performed by: INTERNAL MEDICINE

## 2019-07-12 RX ORDER — SUCRALFATE 1 G/1
4 TABLET ORAL
Qty: 120 | Status: COMPLETED
Start: 2019-07-12 | End: 2020-02-21

## 2019-07-12 NOTE — SERVICEHPINOTES
The patient returns for follow-up of his epigastric burning. This has been going on for months. It seems to be worse when he does not eat. Recent endoscopy showed gastric erosions. Biopsies showed gastritis. A 3 mm diminutive hyperplastic polyp was removed as well. He was told to take omeprazole twice daily and antacids as needed. Despite this his symptoms have persisted. He has decreased his aspirin to 81 mg coated aspirin which he takes after eating.

## 2019-12-11 ENCOUNTER — HOSPITAL ENCOUNTER (INPATIENT)
Dept: HOSPITAL 54 - ER | Age: 79
LOS: 9 days | Discharge: NURSING, REHAB OR LONG-TERM CARE | DRG: 166 | End: 2019-12-20
Attending: INTERNAL MEDICINE | Admitting: INTERNAL MEDICINE
Payer: MEDICARE

## 2019-12-11 VITALS — DIASTOLIC BLOOD PRESSURE: 60 MMHG | SYSTOLIC BLOOD PRESSURE: 150 MMHG

## 2019-12-11 VITALS — BODY MASS INDEX: 30.99 KG/M2 | HEIGHT: 65 IN | WEIGHT: 186 LBS

## 2019-12-11 DIAGNOSIS — J98.11: ICD-10-CM

## 2019-12-11 DIAGNOSIS — G82.20: ICD-10-CM

## 2019-12-11 DIAGNOSIS — E66.9: ICD-10-CM

## 2019-12-11 DIAGNOSIS — I13.0: ICD-10-CM

## 2019-12-11 DIAGNOSIS — J18.9: ICD-10-CM

## 2019-12-11 DIAGNOSIS — Z82.49: ICD-10-CM

## 2019-12-11 DIAGNOSIS — I35.9: ICD-10-CM

## 2019-12-11 DIAGNOSIS — N18.9: ICD-10-CM

## 2019-12-11 DIAGNOSIS — F41.9: ICD-10-CM

## 2019-12-11 DIAGNOSIS — Z83.3: ICD-10-CM

## 2019-12-11 DIAGNOSIS — K21.9: ICD-10-CM

## 2019-12-11 DIAGNOSIS — E22.2: ICD-10-CM

## 2019-12-11 DIAGNOSIS — J96.01: Primary | ICD-10-CM

## 2019-12-11 DIAGNOSIS — K59.00: ICD-10-CM

## 2019-12-11 DIAGNOSIS — Z79.51: ICD-10-CM

## 2019-12-11 DIAGNOSIS — E11.22: ICD-10-CM

## 2019-12-11 DIAGNOSIS — J90: ICD-10-CM

## 2019-12-11 DIAGNOSIS — I50.9: ICD-10-CM
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
## 2019-12-11 DIAGNOSIS — Z79.82: ICD-10-CM

## 2019-12-11 DIAGNOSIS — Z79.899: ICD-10-CM

## 2019-12-11 DIAGNOSIS — I25.2: ICD-10-CM

## 2019-12-11 DIAGNOSIS — N17.0: ICD-10-CM

## 2019-12-11 DIAGNOSIS — M19.90: ICD-10-CM

## 2019-12-11 DIAGNOSIS — E86.1: ICD-10-CM

## 2019-12-11 DIAGNOSIS — Z96.643: ICD-10-CM

## 2019-12-11 DIAGNOSIS — F32.9: ICD-10-CM

## 2019-12-11 DIAGNOSIS — D63.8: ICD-10-CM

## 2019-12-11 DIAGNOSIS — D68.59: ICD-10-CM

## 2019-12-11 DIAGNOSIS — N40.0: ICD-10-CM

## 2019-12-11 DIAGNOSIS — I25.10: ICD-10-CM

## 2019-12-11 DIAGNOSIS — M94.0: ICD-10-CM

## 2019-12-11 DIAGNOSIS — T50.8X5A: ICD-10-CM

## 2019-12-11 DIAGNOSIS — K27.9: ICD-10-CM

## 2019-12-11 DIAGNOSIS — E78.5: ICD-10-CM

## 2019-12-11 DIAGNOSIS — L89.213: ICD-10-CM

## 2019-12-11 DIAGNOSIS — Z66: ICD-10-CM

## 2019-12-11 DIAGNOSIS — A41.9: ICD-10-CM

## 2019-12-11 DIAGNOSIS — Z95.1: ICD-10-CM

## 2019-12-11 LAB
ALBUMIN SERPL BCP-MCNC: 3.1 G/DL (ref 3.4–5)
ALP SERPL-CCNC: 89 U/L (ref 46–116)
ALT SERPL W P-5'-P-CCNC: 24 U/L (ref 12–78)
AST SERPL W P-5'-P-CCNC: 23 U/L (ref 15–37)
BASOPHILS # BLD AUTO: 0.1 /CMM (ref 0–0.2)
BASOPHILS NFR BLD AUTO: 0.6 % (ref 0–2)
BILIRUB DIRECT SERPL-MCNC: 0 MG/DL (ref 0–0.2)
BILIRUB SERPL-MCNC: 0.2 MG/DL (ref 0.2–1)
BUN SERPL-MCNC: 26 MG/DL (ref 7–18)
CALCIUM SERPL-MCNC: 9.7 MG/DL (ref 8.5–10.1)
CHLORIDE SERPL-SCNC: 93 MMOL/L (ref 98–107)
CO2 SERPL-SCNC: 32 MMOL/L (ref 21–32)
CREAT SERPL-MCNC: 1.5 MG/DL (ref 0.6–1.3)
D DIMER PPP FEU-MCNC: 1.39 MG/L(FEU (ref 0.17–0.5)
EOSINOPHIL NFR BLD AUTO: 0 % (ref 0–6)
GLUCOSE SERPL-MCNC: 132 MG/DL (ref 74–106)
HCT VFR BLD AUTO: 32 % (ref 39–51)
HGB BLD-MCNC: 10.5 G/DL (ref 13.5–17.5)
LIPASE SERPL-CCNC: 364 U/L (ref 73–393)
LYMPHOCYTES NFR BLD AUTO: 0.8 /CMM (ref 0.8–4.8)
LYMPHOCYTES NFR BLD AUTO: 6.1 % (ref 20–44)
MCHC RBC AUTO-ENTMCNC: 33 G/DL (ref 31–36)
MCV RBC AUTO: 86 FL (ref 80–96)
MONOCYTES NFR BLD AUTO: 1.8 /CMM (ref 0.1–1.3)
MONOCYTES NFR BLD AUTO: 14.6 % (ref 2–12)
NEUTROPHILS # BLD AUTO: 9.6 /CMM (ref 1.8–8.9)
NEUTROPHILS NFR BLD AUTO: 78.7 % (ref 43–81)
PLATELET # BLD AUTO: 268 /CMM (ref 150–450)
POTASSIUM SERPL-SCNC: 4.1 MMOL/L (ref 3.5–5.1)
PROT SERPL-MCNC: 8.8 G/DL (ref 6.4–8.2)
RBC # BLD AUTO: 3.7 MIL/UL (ref 4.5–6)
SODIUM SERPL-SCNC: 135 MMOL/L (ref 136–145)
WBC NRBC COR # BLD AUTO: 12.2 K/UL (ref 4.3–11)

## 2019-12-11 PROCEDURE — A6248 HYDROGEL DRSG GEL FILLER: HCPCS

## 2019-12-11 PROCEDURE — C1750 CATH, HEMODIALYSIS,LONG-TERM: HCPCS

## 2019-12-11 PROCEDURE — A4216 STERILE WATER/SALINE, 10 ML: HCPCS

## 2019-12-11 PROCEDURE — 0JHL3WZ INSERTION OF TOTALLY IMPLANTABLE VASCULAR ACCESS DEVICE INTO RIGHT UPPER LEG SUBCUTANEOUS TISSUE AND FASCIA, PERCUTANEOUS APPROACH: ICD-10-PCS | Performed by: NURSE PRACTITIONER

## 2019-12-11 PROCEDURE — G0378 HOSPITAL OBSERVATION PER HR: HCPCS

## 2019-12-11 PROCEDURE — A4217 STERILE WATER/SALINE, 500 ML: HCPCS

## 2019-12-11 PROCEDURE — C9113 INJ PANTOPRAZOLE SODIUM, VIA: HCPCS

## 2019-12-11 PROCEDURE — B54BZZA ULTRASONOGRAPHY OF RIGHT LOWER EXTREMITY VEINS, GUIDANCE: ICD-10-PCS | Performed by: NURSE PRACTITIONER

## 2019-12-11 PROCEDURE — 06HM33Z INSERTION OF INFUSION DEVICE INTO RIGHT FEMORAL VEIN, PERCUTANEOUS APPROACH: ICD-10-PCS | Performed by: NURSE PRACTITIONER

## 2019-12-11 PROCEDURE — A9563 P32 NA PHOSPHATE: HCPCS

## 2019-12-11 NOTE — NUR
MS RN NOTES



RECEIVED PATIENT 2255. PATIENT AWAKE, ALERT AND ORIENTED X 4. BREATHING EVEN AND UNLABORED 
ON ROOM AIR. DENIES ACUTE PAIN, NO RESPIRATORY DISTRESS. IV ON L WRIST 18G, CLEAN DRY AND 
INTACT. SHOWS NO SIGNS OF INFILTRATION, NO REDNESS. SAFETY PRECAUTION IN PLACE. BED IN 
LOWEST POSITION, LOCKED, AND CALL LIGHT KEPT WITHIN REACH. WILL CONTINUE TO MONITOR.

## 2019-12-11 NOTE — NUR
GIANNI FROM [facility] C/O MIDSTERNAL CHEST PAIN X2 DAYS DESCRIBED 
AS BURNING, NO SOB NAUSEA OR VOMITING, TO ER BED 3 VSS.

## 2019-12-12 VITALS — SYSTOLIC BLOOD PRESSURE: 109 MMHG | DIASTOLIC BLOOD PRESSURE: 62 MMHG

## 2019-12-12 VITALS — SYSTOLIC BLOOD PRESSURE: 150 MMHG | DIASTOLIC BLOOD PRESSURE: 60 MMHG

## 2019-12-12 VITALS — DIASTOLIC BLOOD PRESSURE: 61 MMHG | SYSTOLIC BLOOD PRESSURE: 117 MMHG

## 2019-12-12 VITALS — DIASTOLIC BLOOD PRESSURE: 46 MMHG | SYSTOLIC BLOOD PRESSURE: 93 MMHG

## 2019-12-12 VITALS — DIASTOLIC BLOOD PRESSURE: 69 MMHG | SYSTOLIC BLOOD PRESSURE: 119 MMHG

## 2019-12-12 LAB
BASOPHILS # BLD AUTO: 0 /CMM (ref 0–0.2)
BASOPHILS NFR BLD AUTO: 0.1 % (ref 0–2)
BUN SERPL-MCNC: 25 MG/DL (ref 7–18)
CALCIUM SERPL-MCNC: 9.5 MG/DL (ref 8.5–10.1)
CHLORIDE SERPL-SCNC: 93 MMOL/L (ref 98–107)
CHOLEST SERPL-MCNC: 109 MG/DL (ref ?–200)
CO2 SERPL-SCNC: 28 MMOL/L (ref 21–32)
CREAT SERPL-MCNC: 1.6 MG/DL (ref 0.6–1.3)
EOSINOPHIL NFR BLD AUTO: 0 % (ref 0–6)
GLUCOSE SERPL-MCNC: 190 MG/DL (ref 74–106)
HCT VFR BLD AUTO: 31 % (ref 39–51)
HDLC SERPL-MCNC: 43 MG/DL (ref 40–60)
HGB BLD-MCNC: 10.4 G/DL (ref 13.5–17.5)
LDLC SERPL DIRECT ASSAY-MCNC: 54 MG/DL (ref 0–99)
LYMPHOCYTES NFR BLD AUTO: 0.3 /CMM (ref 0.8–4.8)
LYMPHOCYTES NFR BLD AUTO: 1.6 % (ref 20–44)
MAGNESIUM SERPL-MCNC: 1.5 MG/DL (ref 1.8–2.4)
MCHC RBC AUTO-ENTMCNC: 33 G/DL (ref 31–36)
MCV RBC AUTO: 86 FL (ref 80–96)
MONOCYTES NFR BLD AUTO: 0.4 /CMM (ref 0.1–1.3)
MONOCYTES NFR BLD AUTO: 2.2 % (ref 2–12)
NEUTROPHILS # BLD AUTO: 17.6 /CMM (ref 1.8–8.9)
NEUTROPHILS NFR BLD AUTO: 96.1 % (ref 43–81)
PHOSPHATE SERPL-MCNC: 1.5 MG/DL (ref 2.5–4.9)
PLATELET # BLD AUTO: 254 /CMM (ref 150–450)
POTASSIUM SERPL-SCNC: 4.1 MMOL/L (ref 3.5–5.1)
RBC # BLD AUTO: 3.66 MIL/UL (ref 4.5–6)
SODIUM SERPL-SCNC: 132 MMOL/L (ref 136–145)
TRIGL SERPL-MCNC: 88 MG/DL (ref 30–150)
WBC NRBC COR # BLD AUTO: 18.3 K/UL (ref 4.3–11)

## 2019-12-12 PROCEDURE — 0W9B3ZZ DRAINAGE OF LEFT PLEURAL CAVITY, PERCUTANEOUS APPROACH: ICD-10-PCS

## 2019-12-12 RX ADMIN — PANTOPRAZOLE SODIUM SCH MG: 40 TABLET, DELAYED RELEASE ORAL at 09:54

## 2019-12-12 RX ADMIN — ASPIRIN 81 MG SCH MG: 81 TABLET ORAL at 00:09

## 2019-12-12 RX ADMIN — DEXTROSE MONOHYDRATE PRN MG: 50 INJECTION, SOLUTION INTRAVENOUS at 16:26

## 2019-12-12 RX ADMIN — MAGNESIUM SULFATE IN DEXTROSE SCH MLS/HR: 10 INJECTION, SOLUTION INTRAVENOUS at 21:07

## 2019-12-12 RX ADMIN — FERROUS SULFATE TAB 325 MG (65 MG ELEMENTAL FE) SCH MG: 325 (65 FE) TAB at 18:47

## 2019-12-12 RX ADMIN — METOPROLOL TARTRATE SCH MG: 50 TABLET, FILM COATED ORAL at 11:40

## 2019-12-12 RX ADMIN — ACETAMINOPHEN PRN MG: 325 TABLET ORAL at 11:41

## 2019-12-12 RX ADMIN — ASPIRIN 81 MG SCH MG: 81 TABLET ORAL at 21:08

## 2019-12-12 RX ADMIN — DEXTROSE MONOHYDRATE PRN MG: 50 INJECTION, SOLUTION INTRAVENOUS at 08:35

## 2019-12-12 RX ADMIN — FERROUS SULFATE TAB 325 MG (65 MG ELEMENTAL FE) SCH MG: 325 (65 FE) TAB at 08:04

## 2019-12-12 RX ADMIN — TRAZODONE HYDROCHLORIDE SCH MG: 50 TABLET ORAL at 21:08

## 2019-12-12 RX ADMIN — ALUMINUM HYDROXIDE, MAGNESIUM HYDROXIDE, AND SIMETHICONE PRN ML: 200; 200; 20 SUSPENSION ORAL at 01:16

## 2019-12-12 RX ADMIN — METOPROLOL TARTRATE SCH MG: 50 TABLET, FILM COATED ORAL at 18:00

## 2019-12-12 RX ADMIN — ACETAMINOPHEN PRN MG: 325 TABLET ORAL at 01:16

## 2019-12-12 RX ADMIN — MAGNESIUM SULFATE IN DEXTROSE SCH MLS/HR: 10 INJECTION, SOLUTION INTRAVENOUS at 18:47

## 2019-12-12 RX ADMIN — TRAZODONE HYDROCHLORIDE SCH MG: 50 TABLET ORAL at 00:09

## 2019-12-12 RX ADMIN — MAGNESIUM SULFATE IN DEXTROSE SCH MLS/HR: 10 INJECTION, SOLUTION INTRAVENOUS at 15:14

## 2019-12-12 RX ADMIN — TAMSULOSIN HYDROCHLORIDE SCH MG: 0.4 CAPSULE ORAL at 08:04

## 2019-12-12 RX ADMIN — DEXTROSE MONOHYDRATE PRN MG: 50 INJECTION, SOLUTION INTRAVENOUS at 03:19

## 2019-12-12 RX ADMIN — ATORVASTATIN CALCIUM SCH MG: 10 TABLET, FILM COATED ORAL at 08:04

## 2019-12-12 RX ADMIN — FLUOXETINE HYDROCHLORIDE SCH MG: 20 CAPSULE ORAL at 08:04

## 2019-12-12 RX ADMIN — THERA TABS SCH UDTAB: TAB at 08:04

## 2019-12-12 RX ADMIN — ALUMINUM HYDROXIDE, MAGNESIUM HYDROXIDE, AND SIMETHICONE PRN ML: 200; 200; 20 SUSPENSION ORAL at 18:47

## 2019-12-12 RX ADMIN — ALUMINUM HYDROXIDE, MAGNESIUM HYDROXIDE, AND SIMETHICONE PRN ML: 200; 200; 20 SUSPENSION ORAL at 08:03

## 2019-12-12 RX ADMIN — DEXTROSE MONOHYDRATE PRN MG: 50 INJECTION, SOLUTION INTRAVENOUS at 22:28

## 2019-12-12 NOTE — NUR
MS RN



RECEIVE PT IN BED WATCHING TV A/O X 3, STABLE AND NOT IN DISTRESS, SAFETY MEASURES AT ALL 
TIMES. WILL CONT TO MONITOR

## 2019-12-12 NOTE — NUR
RN MS NOTES

PT IN BED, AWAKE, ALERT AND ORIENTED, RESPIRATIONS NORMAL, WITH COMPLAINT OF STOMACH 
ACIDITY, NOT IN DISTRESS, CALL LIGHT WITHIN REACH, KEPT WARM AND COMFORTABLE IN BED.

## 2019-12-12 NOTE — NUR
TELE RN NOTES



PATIENT ASLEEP EASILY AROUSED, ALERT AND ORIENTED X 4. BREATHING EVEN AND UNLABORED ON ROOM 
AIR. DENIES ACUTE PAIN, NO RESPIRATORY DISTRESS. TELE MONITOR SINUS RHYTHM/ SINUS TACH, 
HIGHEST 120'S WITH PAC & OCCASIONAL PVC.  IV ON L WRIST 18G, CLEAN DRY AND INTACT. SHOWS NO 
SIGNS OF INFILTRATION, NO REDNESS. ALL DUE MEDICATION GIVEN. SAFETY PRECAUTION IN PLACE. BED 
IN LOWEST POSITION, LOCKED, AND CALL LIGHT KEPT WITHIN REACH. WILL ENDORSE TO ONCOMING 
NURSE.

## 2019-12-12 NOTE — NUR
RN MS NOTES

PT PICKED UP FOR CT ANGIO, AWAKE, ALERT AND ORIENTED, NOT IN PAIN OR DISTRESS, IN STABLE 
CONDITION.

## 2019-12-12 NOTE — NUR
RN MS NOTES

PT IN BED, AWAKE, ALERT AND ORIENTED, STATED HE IS FEELING BETTER NOW, NOT IN PAIN OR 
DISTRESS, COMPLETED CTA, TOLERATED WELL, ASSISTED WITH DINNER, PM CARE PROVIDED, ALL NEEDS 
ATTENDED.

## 2019-12-12 NOTE — NUR
CTA RN NOTE

RECEIVED PATIENT FOR CTA 1610.PATIENT AXOX4.BP IS 94/54..RADIOLOGIST MADE AWARE.TO 
GIVE 500 ML NS BOLUS AND RECHECK BP.PATIENT C/O NAUSEA.ZOFRAN IV GIVEN BY FLOOR NURSE.WILL 
CONTINUE TO MONITOR.

## 2019-12-12 NOTE — NUR
CTA RN NOTE

PATIENT DONE WITH CTA WITH CONTRAST.NO MEDS GIVEN .VSS.PATIENT AXOX4.TRANSFERRED PATIENT 
BACK TO UNIT.

## 2019-12-12 NOTE — NUR
MS RN NOTES



PATIENT COMPLAINED OF NAUSEA. GIVEN PER EMAR ORDER ZOFRAN ON 0323. WILL CONTINUE TO MONITOR.

## 2019-12-12 NOTE — NUR
MS RN NOTES



PATIENT REFUSED BLOOD DRAW FOR LABS 0600. LAB WILL COME AGAIN 0730. WILL CONTINUE TO 
MONITOR.

## 2019-12-12 NOTE — NUR
RN MS NOTES

PT IN BED, RESTING, NO COMPLAINT OF PAIN, NOT IN DISTRESS, SEEN BY DR. MCDANIEL, PLAN OF CARE 
DISCUSSED WITH PT, S/P US GUIDED THORACENTESIS, TOLERATED WELL, PLEURAL FLUID SPECIMEN SENT 
TO LAB, SEEN BY DR. STERN, CTA HEART ORDERED, PT INFORMED, DUE MEDS GIVEN, KEPT WARM IN BED.

## 2019-12-13 VITALS — DIASTOLIC BLOOD PRESSURE: 52 MMHG | SYSTOLIC BLOOD PRESSURE: 105 MMHG

## 2019-12-13 VITALS — SYSTOLIC BLOOD PRESSURE: 104 MMHG | DIASTOLIC BLOOD PRESSURE: 48 MMHG

## 2019-12-13 VITALS — DIASTOLIC BLOOD PRESSURE: 72 MMHG | SYSTOLIC BLOOD PRESSURE: 101 MMHG

## 2019-12-13 LAB
APPEARANCE UR: CLEAR
BASOPHILS # BLD AUTO: 0 /CMM (ref 0–0.2)
BASOPHILS NFR BLD AUTO: 0.2 % (ref 0–2)
BILIRUB UR QL STRIP: NEGATIVE
BUN SERPL-MCNC: 37 MG/DL (ref 7–18)
CALCIUM SERPL-MCNC: 8.8 MG/DL (ref 8.5–10.1)
CHLORIDE SERPL-SCNC: 91 MMOL/L (ref 98–107)
CO2 SERPL-SCNC: 27 MMOL/L (ref 21–32)
COLOR UR: YELLOW
CREAT SERPL-MCNC: 2.6 MG/DL (ref 0.6–1.3)
EOSINOPHIL NFR BLD AUTO: 0 % (ref 0–6)
GLUCOSE SERPL-MCNC: 115 MG/DL (ref 74–106)
GLUCOSE UR STRIP-MCNC: NEGATIVE MG/DL
HCT VFR BLD AUTO: 27 % (ref 39–51)
HGB BLD-MCNC: 8.8 G/DL (ref 13.5–17.5)
HGB UR QL STRIP: (no result) ERY/UL
KETONES UR STRIP-MCNC: NEGATIVE MG/DL
LEUKOCYTE ESTERASE UR QL STRIP: NEGATIVE
LYMPHOCYTES NFR BLD AUTO: 0.8 /CMM (ref 0.8–4.8)
LYMPHOCYTES NFR BLD AUTO: 3.4 % (ref 20–44)
MCHC RBC AUTO-ENTMCNC: 33 G/DL (ref 31–36)
MCV RBC AUTO: 85 FL (ref 80–96)
MONOCYTES NFR BLD AUTO: 13.1 % (ref 2–12)
MONOCYTES NFR BLD AUTO: 3.2 /CMM (ref 0.1–1.3)
NEUTROPHILS # BLD AUTO: 20.3 /CMM (ref 1.8–8.9)
NEUTROPHILS NFR BLD AUTO: 83.3 % (ref 43–81)
NITRITE UR QL STRIP: NEGATIVE
PH UR STRIP: 5.5 [PH] (ref 5–8)
PLATELET # BLD AUTO: 201 /CMM (ref 150–450)
POTASSIUM SERPL-SCNC: 4.3 MMOL/L (ref 3.5–5.1)
PROT UR QL STRIP: (no result) MG/DL
RBC # BLD AUTO: 3.13 MIL/UL (ref 4.5–6)
RBC #/AREA URNS HPF: (no result) /HPF (ref 0–2)
SODIUM SERPL-SCNC: 129 MMOL/L (ref 136–145)
UROBILINOGEN UR STRIP-MCNC: 0.2 EU/DL
WBC #/AREA URNS HPF: (no result) /HPF (ref 0–3)
WBC NRBC COR # BLD AUTO: 24.4 K/UL (ref 4.3–11)

## 2019-12-13 PROCEDURE — 0JBL0ZZ EXCISION OF RIGHT UPPER LEG SUBCUTANEOUS TISSUE AND FASCIA, OPEN APPROACH: ICD-10-PCS

## 2019-12-13 RX ADMIN — ALUMINUM HYDROXIDE, MAGNESIUM HYDROXIDE, AND SIMETHICONE PRN ML: 200; 200; 20 SUSPENSION ORAL at 13:31

## 2019-12-13 RX ADMIN — FERROUS SULFATE TAB 325 MG (65 MG ELEMENTAL FE) SCH MG: 325 (65 FE) TAB at 08:06

## 2019-12-13 RX ADMIN — METOPROLOL TARTRATE SCH MG: 50 TABLET, FILM COATED ORAL at 23:47

## 2019-12-13 RX ADMIN — THERA TABS SCH UDTAB: TAB at 08:07

## 2019-12-13 RX ADMIN — Medication SCH MG: at 18:44

## 2019-12-13 RX ADMIN — METOPROLOL TARTRATE SCH MG: 50 TABLET, FILM COATED ORAL at 05:49

## 2019-12-13 RX ADMIN — FERROUS SULFATE TAB 325 MG (65 MG ELEMENTAL FE) SCH MG: 325 (65 FE) TAB at 17:23

## 2019-12-13 RX ADMIN — PANTOPRAZOLE SODIUM SCH MG: 40 TABLET, DELAYED RELEASE ORAL at 08:06

## 2019-12-13 RX ADMIN — FLUOXETINE HYDROCHLORIDE SCH MG: 20 CAPSULE ORAL at 08:07

## 2019-12-13 RX ADMIN — PIPERACILLIN SODIUM AND TAZOBACTAM SODIUM SCH MLS/HR: .25; 2 INJECTION, POWDER, LYOPHILIZED, FOR SOLUTION INTRAVENOUS at 15:18

## 2019-12-13 RX ADMIN — METOPROLOL TARTRATE SCH MG: 50 TABLET, FILM COATED ORAL at 00:44

## 2019-12-13 RX ADMIN — ATORVASTATIN CALCIUM SCH MG: 10 TABLET, FILM COATED ORAL at 08:06

## 2019-12-13 RX ADMIN — PIPERACILLIN SODIUM AND TAZOBACTAM SODIUM SCH MLS/HR: .25; 2 INJECTION, POWDER, LYOPHILIZED, FOR SOLUTION INTRAVENOUS at 21:21

## 2019-12-13 RX ADMIN — ALUMINUM HYDROXIDE, MAGNESIUM HYDROXIDE, AND SIMETHICONE PRN ML: 200; 200; 20 SUSPENSION ORAL at 07:46

## 2019-12-13 RX ADMIN — Medication SCH GM: at 11:30

## 2019-12-13 RX ADMIN — MAGNESIUM HYDROXIDE PRN ML: 400 SUSPENSION ORAL at 15:37

## 2019-12-13 RX ADMIN — ZOLPIDEM TARTRATE PRN MG: 5 TABLET, FILM COATED ORAL at 21:20

## 2019-12-13 RX ADMIN — SODIUM CHLORIDE SCH MG: 9 INJECTION, SOLUTION INTRAVENOUS at 10:02

## 2019-12-13 RX ADMIN — ASPIRIN 81 MG SCH MG: 81 TABLET ORAL at 21:20

## 2019-12-13 RX ADMIN — ALUMINUM HYDROXIDE, MAGNESIUM HYDROXIDE, AND SIMETHICONE PRN ML: 200; 200; 20 SUSPENSION ORAL at 18:44

## 2019-12-13 RX ADMIN — LEVOFLOXACIN SCH MG: 500 TABLET, FILM COATED ORAL at 20:35

## 2019-12-13 RX ADMIN — METOPROLOL TARTRATE SCH MG: 50 TABLET, FILM COATED ORAL at 17:22

## 2019-12-13 RX ADMIN — METOPROLOL TARTRATE SCH MG: 50 TABLET, FILM COATED ORAL at 12:43

## 2019-12-13 RX ADMIN — TRAZODONE HYDROCHLORIDE SCH MG: 50 TABLET ORAL at 21:20

## 2019-12-13 RX ADMIN — ALUMINUM HYDROXIDE, MAGNESIUM HYDROXIDE, AND SIMETHICONE PRN ML: 200; 200; 20 SUSPENSION ORAL at 00:44

## 2019-12-13 RX ADMIN — TAMSULOSIN HYDROCHLORIDE SCH MG: 0.4 CAPSULE ORAL at 08:07

## 2019-12-13 NOTE — NUR
MS RN ENEMA



Enema done to patient with the use of sterile water. 800cc of sterile water used to 
stimulate stool. Patient had flatus present, but still no stool present. Stayed with patient 
for 30 min and was still unable to have a bowel movement. Gave patient prune juice to help 
relief constipation. Patient still uncomfortable, but less cramping in abdominal area. Will 
continue to monitor.

## 2019-12-13 NOTE — NUR
RN CLOSING NOTES



patient remains on room air, no sob noted, patient shows no s/s of pain at this time.  
Patient remains a/o x4 and is requesting enema at 2000.  Patient s/p R ischial wound 
debridement.  L Wrist  ml per hour IV LR.  Bed at the lowest setting, call light 
within reach, side rails up x2.  Will give report to NOC RN for BLAINE bedside.

## 2019-12-13 NOTE — NUR
MS RN OPENING NOTES 



Patient is currently resting in bed a/o x4. No SOB noted, no signs of acute distress, and no 
current complaints of pain. Only discomfort due to constipation. Sacral wound kept clean and 
dry. IV located on L AC #18 patent and intact. Safety precautions are in place with bed in 
lowest position, breaks on, call light within reach. Will continue to monitor.

## 2019-12-13 NOTE — NUR
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 
SCARRING AND LEFT LOWER BUTTOCK SCARRING, PRESENT ON ADMISSION. RECOMMEND SURGICAL CONSULT. 
DR SAMIR STORM NOTIFIED OF CONSULT REQUEST. RECOMMENDATIONS MADE FOR WOUND CARE AND SKIN 
PROTECTION. DISCUSSED WITH NURSING STAFF. PT IS INCONTINENT. FIRST STEP LOW AIRLOSS MATTRESS 
ORDERED. WILL SEE MAYE CEBALLOS IN AGREEMENT WITH PLAN OF  CARE. 

-------------------------------------------------------------------------------

Addendum: 12/13/19 at 1114 by MARI FLEMING WNDNU

-------------------------------------------------------------------------------

Amended: Links added.

## 2019-12-13 NOTE — NUR
RN OPENING NOTES



Patient received on room air, no sob noted, patient shows no s/s of pain at this time.  A/O 
x4, L wrist 18 LAC 18.  No bleeding noted S/P guided thora.  Bed at the lowest setting, call 
light within reach, side rails up x2.

## 2019-12-13 NOTE — NUR
MS RN



NO SIGNIFICANT CHANGES THROUGHOUT THE SHIFT, PT SLEPT WELL. NO SHORTNESS OF BREATH. 
RESPIRATIONS EVEN AND UNLABORED. NEEDS ATTENDED AND ANTICIPATED. KEPT CLEAN, DRY AND 
COMFORTABLE. NURSING CARE RENDERED. REPOSITION EVERY 2 HOURS. WOUND CARE AS ORDERED. SAFETY 
MEASURES AT ALL TIMES. WILL ENDORSE TO NEXT SHIFT.

## 2019-12-14 VITALS — SYSTOLIC BLOOD PRESSURE: 105 MMHG | DIASTOLIC BLOOD PRESSURE: 56 MMHG

## 2019-12-14 VITALS — DIASTOLIC BLOOD PRESSURE: 58 MMHG | SYSTOLIC BLOOD PRESSURE: 126 MMHG

## 2019-12-14 VITALS — SYSTOLIC BLOOD PRESSURE: 98 MMHG | DIASTOLIC BLOOD PRESSURE: 57 MMHG

## 2019-12-14 VITALS — SYSTOLIC BLOOD PRESSURE: 103 MMHG | DIASTOLIC BLOOD PRESSURE: 79 MMHG

## 2019-12-14 LAB
BASOPHILS # BLD AUTO: 0 /CMM (ref 0–0.2)
BASOPHILS NFR BLD AUTO: 0.1 % (ref 0–2)
BUN SERPL-MCNC: 55 MG/DL (ref 7–18)
CALCIUM SERPL-MCNC: 8.8 MG/DL (ref 8.5–10.1)
CHLORIDE SERPL-SCNC: 89 MMOL/L (ref 98–107)
CO2 SERPL-SCNC: 23 MMOL/L (ref 21–32)
CREAT SERPL-MCNC: 4 MG/DL (ref 0.6–1.3)
EOSINOPHIL NFR BLD AUTO: 0 % (ref 0–6)
GLUCOSE SERPL-MCNC: 127 MG/DL (ref 74–106)
HCT VFR BLD AUTO: 27 % (ref 39–51)
HGB BLD-MCNC: 9 G/DL (ref 13.5–17.5)
LYMPHOCYTES NFR BLD AUTO: 0.6 /CMM (ref 0.8–4.8)
LYMPHOCYTES NFR BLD AUTO: 2.6 % (ref 20–44)
MCHC RBC AUTO-ENTMCNC: 33 G/DL (ref 31–36)
MCV RBC AUTO: 86 FL (ref 80–96)
MONOCYTES NFR BLD AUTO: 13.2 % (ref 2–12)
MONOCYTES NFR BLD AUTO: 3.3 /CMM (ref 0.1–1.3)
NEUTROPHILS # BLD AUTO: 20.8 /CMM (ref 1.8–8.9)
NEUTROPHILS NFR BLD AUTO: 84.1 % (ref 43–81)
OSMOLALITY UR: 314 MOS/KG (ref 340–1090)
PLATELET # BLD AUTO: 207 /CMM (ref 150–450)
POTASSIUM SERPL-SCNC: 5.1 MMOL/L (ref 3.5–5.1)
RBC # BLD AUTO: 3.14 MIL/UL (ref 4.5–6)
SODIUM SERPL-SCNC: 125 MMOL/L (ref 136–145)
SODIUM UR-SCNC: 8 MMOL/L (ref 40–220)
WBC NRBC COR # BLD AUTO: 24.8 K/UL (ref 4.3–11)

## 2019-12-14 PROCEDURE — 05H933Z INSERTION OF INFUSION DEVICE INTO RIGHT BRACHIAL VEIN, PERCUTANEOUS APPROACH: ICD-10-PCS | Performed by: NURSE PRACTITIONER

## 2019-12-14 RX ADMIN — ALUMINUM HYDROXIDE, MAGNESIUM HYDROXIDE, AND SIMETHICONE PRN ML: 200; 200; 20 SUSPENSION ORAL at 11:07

## 2019-12-14 RX ADMIN — ZOLPIDEM TARTRATE PRN MG: 5 TABLET, FILM COATED ORAL at 22:55

## 2019-12-14 RX ADMIN — Medication SCH MG: at 08:49

## 2019-12-14 RX ADMIN — METOPROLOL TARTRATE SCH MG: 50 TABLET, FILM COATED ORAL at 17:22

## 2019-12-14 RX ADMIN — DEXTROSE MONOHYDRATE PRN MG: 50 INJECTION, SOLUTION INTRAVENOUS at 12:04

## 2019-12-14 RX ADMIN — FLUOXETINE HYDROCHLORIDE SCH MG: 20 CAPSULE ORAL at 08:48

## 2019-12-14 RX ADMIN — FERROUS SULFATE TAB 325 MG (65 MG ELEMENTAL FE) SCH MG: 325 (65 FE) TAB at 08:48

## 2019-12-14 RX ADMIN — PIPERACILLIN SODIUM AND TAZOBACTAM SODIUM SCH MLS/HR: .25; 2 INJECTION, POWDER, LYOPHILIZED, FOR SOLUTION INTRAVENOUS at 05:47

## 2019-12-14 RX ADMIN — Medication SCH MG: at 17:22

## 2019-12-14 RX ADMIN — FUROSEMIDE SCH MG: 10 INJECTION, SOLUTION INTRAMUSCULAR; INTRAVENOUS at 11:02

## 2019-12-14 RX ADMIN — TRAZODONE HYDROCHLORIDE SCH MG: 50 TABLET ORAL at 21:20

## 2019-12-14 RX ADMIN — SODIUM CHLORIDE SCH MG: 9 INJECTION, SOLUTION INTRAVENOUS at 08:48

## 2019-12-14 RX ADMIN — FERROUS SULFATE TAB 325 MG (65 MG ELEMENTAL FE) SCH MG: 325 (65 FE) TAB at 17:22

## 2019-12-14 RX ADMIN — PIPERACILLIN SODIUM AND TAZOBACTAM SODIUM SCH MLS/HR: .25; 2 INJECTION, POWDER, LYOPHILIZED, FOR SOLUTION INTRAVENOUS at 14:15

## 2019-12-14 RX ADMIN — METOPROLOL TARTRATE SCH MG: 50 TABLET, FILM COATED ORAL at 12:32

## 2019-12-14 RX ADMIN — FUROSEMIDE SCH MG: 10 INJECTION, SOLUTION INTRAMUSCULAR; INTRAVENOUS at 17:22

## 2019-12-14 RX ADMIN — ALBUTEROL SULFATE PRN MG: 2.5 SOLUTION RESPIRATORY (INHALATION) at 16:46

## 2019-12-14 RX ADMIN — ASPIRIN 81 MG SCH MG: 81 TABLET ORAL at 21:20

## 2019-12-14 RX ADMIN — CEFEPIME HYDROCHLORIDE SCH MLS/HR: 1 INJECTION, POWDER, FOR SOLUTION INTRAMUSCULAR; INTRAVENOUS at 19:28

## 2019-12-14 RX ADMIN — TAMSULOSIN HYDROCHLORIDE SCH MG: 0.4 CAPSULE ORAL at 08:48

## 2019-12-14 RX ADMIN — THERA TABS SCH UDTAB: TAB at 08:48

## 2019-12-14 RX ADMIN — Medication SCH GM: at 08:49

## 2019-12-14 RX ADMIN — METOPROLOL TARTRATE SCH MG: 50 TABLET, FILM COATED ORAL at 05:48

## 2019-12-14 RX ADMIN — ATORVASTATIN CALCIUM SCH MG: 10 TABLET, FILM COATED ORAL at 08:50

## 2019-12-14 NOTE — NUR
patient complaining of shotness of breath , the O2 sat checked was 89 % in 2 lpm nasal 
canula , increased O2 to 5 liter , the NP Al was at the bedside he informed and ordered 
breathing treatment , chest x-ray stat .

## 2019-12-14 NOTE — NUR
MS RN NOTES 



Patient had bowel movement;brown, formed, and moderate amount. Patient feels relieved and 
better, less cramping and less discomfort. Will continue to monitor.

## 2019-12-14 NOTE — NUR
MS RN OPENING NOTES 



Patient currently resting in bed a/o x4. On 4L via NC. No SOB noted, no current complaints 
of pain or discomfort, and no acute distress noted. Midline located on R UA #18 patent and 
intact. Branham is in place. Safety precautions are in place with bed in lowest position, 
breaks on, and call light within reach. Will continue to monitor.

## 2019-12-14 NOTE — NUR
M/S RN CLOSING NOTES



Patient currently resting in bed a/o x4. Patient on 2L of O2 via NC, no shortness of breath 
noted with breathing even and unlabored. No signs of acute distress and no complaints of 
pain and discomfort. Patient was able to have two bowel movements throughout the night. IV 
located on L AC #18. All needs were met, patient was kept and dry. Safety precautions in 
place with bed in lowest position, call light within reach, and bed in lowest position. Will 
endorse to oncoming shift about BALINE.

## 2019-12-15 VITALS — SYSTOLIC BLOOD PRESSURE: 101 MMHG | DIASTOLIC BLOOD PRESSURE: 42 MMHG

## 2019-12-15 VITALS — DIASTOLIC BLOOD PRESSURE: 60 MMHG | SYSTOLIC BLOOD PRESSURE: 101 MMHG

## 2019-12-15 VITALS — SYSTOLIC BLOOD PRESSURE: 97 MMHG | DIASTOLIC BLOOD PRESSURE: 68 MMHG

## 2019-12-15 VITALS — DIASTOLIC BLOOD PRESSURE: 67 MMHG | SYSTOLIC BLOOD PRESSURE: 102 MMHG

## 2019-12-15 VITALS — SYSTOLIC BLOOD PRESSURE: 101 MMHG | DIASTOLIC BLOOD PRESSURE: 55 MMHG

## 2019-12-15 VITALS — DIASTOLIC BLOOD PRESSURE: 65 MMHG | SYSTOLIC BLOOD PRESSURE: 101 MMHG

## 2019-12-15 VITALS — SYSTOLIC BLOOD PRESSURE: 90 MMHG | DIASTOLIC BLOOD PRESSURE: 53 MMHG

## 2019-12-15 VITALS — DIASTOLIC BLOOD PRESSURE: 54 MMHG | SYSTOLIC BLOOD PRESSURE: 94 MMHG

## 2019-12-15 VITALS — SYSTOLIC BLOOD PRESSURE: 99 MMHG | DIASTOLIC BLOOD PRESSURE: 63 MMHG

## 2019-12-15 VITALS — DIASTOLIC BLOOD PRESSURE: 61 MMHG | SYSTOLIC BLOOD PRESSURE: 96 MMHG

## 2019-12-15 VITALS — DIASTOLIC BLOOD PRESSURE: 53 MMHG | SYSTOLIC BLOOD PRESSURE: 97 MMHG

## 2019-12-15 VITALS — SYSTOLIC BLOOD PRESSURE: 92 MMHG | DIASTOLIC BLOOD PRESSURE: 60 MMHG

## 2019-12-15 VITALS — SYSTOLIC BLOOD PRESSURE: 109 MMHG | DIASTOLIC BLOOD PRESSURE: 61 MMHG

## 2019-12-15 VITALS — DIASTOLIC BLOOD PRESSURE: 62 MMHG | SYSTOLIC BLOOD PRESSURE: 96 MMHG

## 2019-12-15 VITALS — SYSTOLIC BLOOD PRESSURE: 103 MMHG | DIASTOLIC BLOOD PRESSURE: 65 MMHG

## 2019-12-15 VITALS — SYSTOLIC BLOOD PRESSURE: 93 MMHG | DIASTOLIC BLOOD PRESSURE: 60 MMHG

## 2019-12-15 VITALS — DIASTOLIC BLOOD PRESSURE: 47 MMHG | SYSTOLIC BLOOD PRESSURE: 95 MMHG

## 2019-12-15 VITALS — SYSTOLIC BLOOD PRESSURE: 103 MMHG | DIASTOLIC BLOOD PRESSURE: 62 MMHG

## 2019-12-15 VITALS — SYSTOLIC BLOOD PRESSURE: 96 MMHG | DIASTOLIC BLOOD PRESSURE: 60 MMHG

## 2019-12-15 VITALS — DIASTOLIC BLOOD PRESSURE: 60 MMHG | SYSTOLIC BLOOD PRESSURE: 95 MMHG

## 2019-12-15 VITALS — DIASTOLIC BLOOD PRESSURE: 62 MMHG | SYSTOLIC BLOOD PRESSURE: 107 MMHG

## 2019-12-15 VITALS — DIASTOLIC BLOOD PRESSURE: 55 MMHG | SYSTOLIC BLOOD PRESSURE: 94 MMHG

## 2019-12-15 VITALS — DIASTOLIC BLOOD PRESSURE: 24 MMHG | SYSTOLIC BLOOD PRESSURE: 91 MMHG

## 2019-12-15 VITALS — SYSTOLIC BLOOD PRESSURE: 94 MMHG | DIASTOLIC BLOOD PRESSURE: 56 MMHG

## 2019-12-15 VITALS — SYSTOLIC BLOOD PRESSURE: 123 MMHG | DIASTOLIC BLOOD PRESSURE: 64 MMHG

## 2019-12-15 VITALS — DIASTOLIC BLOOD PRESSURE: 83 MMHG | SYSTOLIC BLOOD PRESSURE: 131 MMHG

## 2019-12-15 VITALS — SYSTOLIC BLOOD PRESSURE: 103 MMHG | DIASTOLIC BLOOD PRESSURE: 64 MMHG

## 2019-12-15 VITALS — DIASTOLIC BLOOD PRESSURE: 62 MMHG | SYSTOLIC BLOOD PRESSURE: 101 MMHG

## 2019-12-15 VITALS — SYSTOLIC BLOOD PRESSURE: 98 MMHG | DIASTOLIC BLOOD PRESSURE: 62 MMHG

## 2019-12-15 VITALS — SYSTOLIC BLOOD PRESSURE: 118 MMHG | DIASTOLIC BLOOD PRESSURE: 76 MMHG

## 2019-12-15 VITALS — DIASTOLIC BLOOD PRESSURE: 53 MMHG | SYSTOLIC BLOOD PRESSURE: 99 MMHG

## 2019-12-15 VITALS — DIASTOLIC BLOOD PRESSURE: 63 MMHG | SYSTOLIC BLOOD PRESSURE: 98 MMHG

## 2019-12-15 VITALS — DIASTOLIC BLOOD PRESSURE: 75 MMHG | SYSTOLIC BLOOD PRESSURE: 120 MMHG

## 2019-12-15 VITALS — DIASTOLIC BLOOD PRESSURE: 59 MMHG | SYSTOLIC BLOOD PRESSURE: 96 MMHG

## 2019-12-15 VITALS — DIASTOLIC BLOOD PRESSURE: 55 MMHG | SYSTOLIC BLOOD PRESSURE: 92 MMHG

## 2019-12-15 VITALS — SYSTOLIC BLOOD PRESSURE: 109 MMHG | DIASTOLIC BLOOD PRESSURE: 67 MMHG

## 2019-12-15 VITALS — SYSTOLIC BLOOD PRESSURE: 94 MMHG | DIASTOLIC BLOOD PRESSURE: 52 MMHG

## 2019-12-15 VITALS — DIASTOLIC BLOOD PRESSURE: 63 MMHG | SYSTOLIC BLOOD PRESSURE: 96 MMHG

## 2019-12-15 VITALS — SYSTOLIC BLOOD PRESSURE: 104 MMHG | DIASTOLIC BLOOD PRESSURE: 60 MMHG

## 2019-12-15 VITALS — SYSTOLIC BLOOD PRESSURE: 143 MMHG | DIASTOLIC BLOOD PRESSURE: 75 MMHG

## 2019-12-15 VITALS — SYSTOLIC BLOOD PRESSURE: 101 MMHG | DIASTOLIC BLOOD PRESSURE: 66 MMHG

## 2019-12-15 VITALS — SYSTOLIC BLOOD PRESSURE: 102 MMHG | DIASTOLIC BLOOD PRESSURE: 67 MMHG

## 2019-12-15 VITALS — SYSTOLIC BLOOD PRESSURE: 144 MMHG | DIASTOLIC BLOOD PRESSURE: 53 MMHG

## 2019-12-15 VITALS — DIASTOLIC BLOOD PRESSURE: 61 MMHG | SYSTOLIC BLOOD PRESSURE: 105 MMHG

## 2019-12-15 VITALS — SYSTOLIC BLOOD PRESSURE: 132 MMHG | DIASTOLIC BLOOD PRESSURE: 79 MMHG

## 2019-12-15 VITALS — SYSTOLIC BLOOD PRESSURE: 115 MMHG | DIASTOLIC BLOOD PRESSURE: 52 MMHG

## 2019-12-15 VITALS — SYSTOLIC BLOOD PRESSURE: 102 MMHG | DIASTOLIC BLOOD PRESSURE: 56 MMHG

## 2019-12-15 VITALS — DIASTOLIC BLOOD PRESSURE: 58 MMHG | SYSTOLIC BLOOD PRESSURE: 115 MMHG

## 2019-12-15 VITALS — DIASTOLIC BLOOD PRESSURE: 55 MMHG | SYSTOLIC BLOOD PRESSURE: 101 MMHG

## 2019-12-15 VITALS — DIASTOLIC BLOOD PRESSURE: 58 MMHG | SYSTOLIC BLOOD PRESSURE: 101 MMHG

## 2019-12-15 VITALS — DIASTOLIC BLOOD PRESSURE: 60 MMHG | SYSTOLIC BLOOD PRESSURE: 92 MMHG

## 2019-12-15 VITALS — SYSTOLIC BLOOD PRESSURE: 100 MMHG | DIASTOLIC BLOOD PRESSURE: 65 MMHG

## 2019-12-15 VITALS — DIASTOLIC BLOOD PRESSURE: 61 MMHG | SYSTOLIC BLOOD PRESSURE: 89 MMHG

## 2019-12-15 VITALS — DIASTOLIC BLOOD PRESSURE: 63 MMHG | SYSTOLIC BLOOD PRESSURE: 92 MMHG

## 2019-12-15 VITALS — SYSTOLIC BLOOD PRESSURE: 99 MMHG | DIASTOLIC BLOOD PRESSURE: 67 MMHG

## 2019-12-15 VITALS — DIASTOLIC BLOOD PRESSURE: 64 MMHG | SYSTOLIC BLOOD PRESSURE: 97 MMHG

## 2019-12-15 VITALS — DIASTOLIC BLOOD PRESSURE: 60 MMHG | SYSTOLIC BLOOD PRESSURE: 89 MMHG

## 2019-12-15 VITALS — SYSTOLIC BLOOD PRESSURE: 94 MMHG | DIASTOLIC BLOOD PRESSURE: 59 MMHG

## 2019-12-15 VITALS — DIASTOLIC BLOOD PRESSURE: 54 MMHG | SYSTOLIC BLOOD PRESSURE: 99 MMHG

## 2019-12-15 VITALS — SYSTOLIC BLOOD PRESSURE: 92 MMHG | DIASTOLIC BLOOD PRESSURE: 59 MMHG

## 2019-12-15 VITALS — DIASTOLIC BLOOD PRESSURE: 58 MMHG | SYSTOLIC BLOOD PRESSURE: 94 MMHG

## 2019-12-15 VITALS — SYSTOLIC BLOOD PRESSURE: 119 MMHG | DIASTOLIC BLOOD PRESSURE: 75 MMHG

## 2019-12-15 VITALS — DIASTOLIC BLOOD PRESSURE: 55 MMHG | SYSTOLIC BLOOD PRESSURE: 98 MMHG

## 2019-12-15 LAB
BASE EXCESS BLDA CALC-SCNC: -1.5 MMOL/L
BASE EXCESS BLDA CALC-SCNC: -2.4 MMOL/L
BASOPHILS # BLD AUTO: 0 /CMM (ref 0–0.2)
BASOPHILS NFR BLD AUTO: 0.1 % (ref 0–2)
BUN SERPL-MCNC: 70 MG/DL (ref 7–18)
CALCIUM SERPL-MCNC: 8.3 MG/DL (ref 8.5–10.1)
CHLORIDE SERPL-SCNC: 88 MMOL/L (ref 98–107)
CO2 SERPL-SCNC: 26 MMOL/L (ref 21–32)
CREAT SERPL-MCNC: 4.9 MG/DL (ref 0.6–1.3)
DO-HGB MFR BLDA: 211.8 MMHG
DO-HGB MFR BLDA: 494 MMHG
EOSINOPHIL NFR BLD AUTO: 0 % (ref 0–6)
GLUCOSE SERPL-MCNC: 132 MG/DL (ref 74–106)
HCT VFR BLD AUTO: 28 % (ref 39–51)
HGB BLD-MCNC: 9.3 G/DL (ref 13.5–17.5)
INHALED O2 CONCENTRATION: 100 %
INHALED O2 CONCENTRATION: 44 %
INHALED O2 FLOW RATE: 6 L/MIN (ref 0–30)
LYMPHOCYTES NFR BLD AUTO: 0.4 /CMM (ref 0.8–4.8)
LYMPHOCYTES NFR BLD AUTO: 2.8 % (ref 20–44)
MAGNESIUM SERPL-MCNC: 3.3 MG/DL (ref 1.8–2.4)
MCHC RBC AUTO-ENTMCNC: 33 G/DL (ref 31–36)
MCV RBC AUTO: 84 FL (ref 80–96)
MONOCYTES NFR BLD AUTO: 1.4 /CMM (ref 0.1–1.3)
MONOCYTES NFR BLD AUTO: 8.8 % (ref 2–12)
NEUTROPHILS # BLD AUTO: 13.8 /CMM (ref 1.8–8.9)
NEUTROPHILS NFR BLD AUTO: 88.3 % (ref 43–81)
PCO2 TEMP ADJ BLDA: 29.8 MMHG (ref 35–45)
PCO2 TEMP ADJ BLDA: 31.1 MMHG (ref 35–45)
PH TEMP ADJ BLDA: 7.45 [PH] (ref 7.35–7.45)
PH TEMP ADJ BLDA: 7.48 [PH] (ref 7.35–7.45)
PHOSPHATE SERPL-MCNC: 5.8 MG/DL (ref 2.5–4.9)
PLATELET # BLD AUTO: 190 /CMM (ref 150–450)
PO2 TEMP ADJ BLDA: 189.2 MMHG (ref 75–100)
PO2 TEMP ADJ BLDA: 66.5 MMHG (ref 75–100)
POTASSIUM SERPL-SCNC: 5 MMOL/L (ref 3.5–5.1)
RBC # BLD AUTO: 3.33 MIL/UL (ref 4.5–6)
SAO2 % BLDA: 89.9 % (ref 92–98.5)
SAO2 % BLDA: 98.6 % (ref 92–98.5)
SODIUM SERPL-SCNC: 125 MMOL/L (ref 136–145)
TSH SERPL DL<=0.005 MIU/L-ACNC: 2.38 UIU/ML (ref 0.36–3.74)
URATE SERPL-MCNC: 10.1 MG/DL (ref 2.6–7.2)
VENTILATION MODE VENT: (no result)
VENTILATION MODE VENT: (no result)
WBC NRBC COR # BLD AUTO: 15.6 K/UL (ref 4.3–11)

## 2019-12-15 PROCEDURE — 0BH17EZ INSERTION OF ENDOTRACHEAL AIRWAY INTO TRACHEA, VIA NATURAL OR ARTIFICIAL OPENING: ICD-10-PCS | Performed by: NURSE PRACTITIONER

## 2019-12-15 PROCEDURE — 5A1945Z RESPIRATORY VENTILATION, 24-96 CONSECUTIVE HOURS: ICD-10-PCS | Performed by: INTERNAL MEDICINE

## 2019-12-15 PROCEDURE — 5A1D70Z PERFORMANCE OF URINARY FILTRATION, INTERMITTENT, LESS THAN 6 HOURS PER DAY: ICD-10-PCS | Performed by: INTERNAL MEDICINE

## 2019-12-15 RX ADMIN — TRAZODONE HYDROCHLORIDE SCH MG: 50 TABLET ORAL at 22:06

## 2019-12-15 RX ADMIN — Medication SCH MG: at 09:00

## 2019-12-15 RX ADMIN — FLUOXETINE HYDROCHLORIDE SCH MG: 20 CAPSULE ORAL at 09:00

## 2019-12-15 RX ADMIN — Medication SCH MG: at 17:00

## 2019-12-15 RX ADMIN — Medication SCH MG: at 19:42

## 2019-12-15 RX ADMIN — TAMSULOSIN HYDROCHLORIDE SCH MG: 0.4 CAPSULE ORAL at 09:33

## 2019-12-15 RX ADMIN — ASPIRIN 81 MG SCH MG: 81 TABLET ORAL at 22:06

## 2019-12-15 RX ADMIN — THERA TABS SCH UDTAB: TAB at 09:00

## 2019-12-15 RX ADMIN — ALBUTEROL SULFATE PRN MG: 2.5 SOLUTION RESPIRATORY (INHALATION) at 03:37

## 2019-12-15 RX ADMIN — Medication SCH EACH: at 11:49

## 2019-12-15 RX ADMIN — PROPOFOL PRN MLS/HR: 10 INJECTION, EMULSION INTRAVENOUS at 17:06

## 2019-12-15 RX ADMIN — FERROUS SULFATE TAB 325 MG (65 MG ELEMENTAL FE) SCH MG: 325 (65 FE) TAB at 17:00

## 2019-12-15 RX ADMIN — METOPROLOL TARTRATE SCH MG: 50 TABLET, FILM COATED ORAL at 05:48

## 2019-12-15 RX ADMIN — FUROSEMIDE SCH MG: 10 INJECTION, SOLUTION INTRAMUSCULAR; INTRAVENOUS at 18:09

## 2019-12-15 RX ADMIN — FUROSEMIDE SCH MG: 10 INJECTION, SOLUTION INTRAMUSCULAR; INTRAVENOUS at 09:33

## 2019-12-15 RX ADMIN — ALBUTEROL SULFATE SCH MG: 1.25 SOLUTION RESPIRATORY (INHALATION) at 21:00

## 2019-12-15 RX ADMIN — ALBUTEROL SULFATE SCH MG: 1.25 SOLUTION RESPIRATORY (INHALATION) at 13:00

## 2019-12-15 RX ADMIN — Medication SCH EACH: at 17:06

## 2019-12-15 RX ADMIN — METOPROLOL TARTRATE SCH MG: 50 TABLET, FILM COATED ORAL at 00:00

## 2019-12-15 RX ADMIN — CEFEPIME HYDROCHLORIDE SCH MLS/HR: 1 INJECTION, POWDER, FOR SOLUTION INTRAMUSCULAR; INTRAVENOUS at 19:17

## 2019-12-15 RX ADMIN — Medication SCH MG: at 14:46

## 2019-12-15 RX ADMIN — ALBUTEROL SULFATE SCH MG: 1.25 SOLUTION RESPIRATORY (INHALATION) at 17:00

## 2019-12-15 RX ADMIN — METOPROLOL TARTRATE SCH MG: 50 TABLET, FILM COATED ORAL at 12:00

## 2019-12-15 RX ADMIN — FERROUS SULFATE TAB 325 MG (65 MG ELEMENTAL FE) SCH MG: 325 (65 FE) TAB at 09:00

## 2019-12-15 RX ADMIN — Medication SCH MG: at 12:30

## 2019-12-15 RX ADMIN — LEVOFLOXACIN SCH MG: 500 TABLET, FILM COATED ORAL at 19:46

## 2019-12-15 RX ADMIN — Medication SCH GM: at 16:27

## 2019-12-15 RX ADMIN — Medication SCH MG: at 23:19

## 2019-12-15 RX ADMIN — METOPROLOL TARTRATE SCH MG: 50 TABLET, FILM COATED ORAL at 17:07

## 2019-12-15 RX ADMIN — SODIUM CHLORIDE SCH MG: 9 INJECTION, SOLUTION INTRAVENOUS at 09:33

## 2019-12-15 RX ADMIN — ATORVASTATIN CALCIUM SCH MG: 10 TABLET, FILM COATED ORAL at 09:00

## 2019-12-15 NOTE — NUR
RN NOTES

1130-PATIENT BROTHER MILADIS VISITS TOGETHER WITH HIS FAMILY, THEY WERE UPDATED OF PATIENT 
STATUS. PATIENT ON AC MODE, 60% Fi02. SATURATION UP %. PATIENT REPOSITIONED FOR 
COMFORT AND SAFETY. PROPOFOL DRIP AT 15 MCG/KG/MIN, PATIENT OPENS EYES WHEN NAME CALLED OUT 
LOUD, ENCOURAGED HIM TO RELAX.

## 2019-12-15 NOTE — NUR
RN NOTES

 8761-PATIENT RESTING. NO SIGN OF PAIN. ED, DAUGHTER, VISITS AND PATIENT BELONGINGS 
GIVEN TO HER, SIGNED BELONGINGS LIST.ALSO INFORMED HER THAT RITIKA MERSON CALLED FOR 
PATIENT.IFC WITH YELLOW DRAIN NOTED.RELEASED RESTRAINTS EARLIER AND NOTED PATIENT RECAHING 
FOR LINES, EXPLAINED TO HIM THE NEED TO PUT RESTRAINTS FOR HIS SAFETY

## 2019-12-15 NOTE — NUR
MS RN NOTES



Patient keeps removing oxygen, making him desat to the low 80's. Let Dr. Ball know 
about soft restraints order.

## 2019-12-15 NOTE — NUR
MS RN NOTES



RT put patient on 10L via face mask. O2 went up to >90's %. Will keep on and continue to 
monitor.

## 2019-12-15 NOTE — NUR
MS RN NOTES- CODE BLUE



0700 entered room to give report to oncoming shift. Noticed patient did not have O2 mask on, 
was cyanotic, with labored breathing. Put mask back on, raised head of bed, and called code 
blue. Vital signs noted BP 50/31 R 36 P 57 Spo2 83% . Rapid response team arrived and 
took over emergency care. Patient was intubated. Last vital signs taken at 0710 /75 HR 
77 R 24 Spo2 99%. Patient transferred to ICU room #254-1. Report given to ICU nurse Juan. 
Left message to carmelo Eckert about patients current condition.

## 2019-12-15 NOTE — NUR
RN ICU - NOTES - PT RECEIVED IN BED INTUBATED WITH 7.5 ETT @ 25 CM AT LIP, SEDATED ON DIP @ 
15 MCG/KG/MIN. PT DOES WAKE UP AND IS ALERT AND ABLE TO ANSWER QUESTIONS APPROPRIATELY, PT 
HAS BILATERAL SOFT WRIST RESTRAINTS TO PREVENT PT SELF EXTUBATION. PT IS TOLERATING VENT 
WELL, NO RESP DISTRESS. PT IS IN NSR HR 60S, SBP > 90. PT HAS OGT CLAMPED, PT HAS F/C WITH 
LARGE AMOUNT OF CLEAR YELLOW URINE. SKIN ISSUES NOTED, PT HAS SHANDRA MIDLINE, R THUMB 20G AND R 
FEM HD CATH WITH PIGTAIL. WILL CONTINUE TO MONITOR

## 2019-12-15 NOTE — NUR
RN NOTES

1700-PATIENT STABLE, NO SIGN OF PAIN. DAUGHTER AT BEDSIDE.PATIENT OPENS EYES TO VOICE

1830-RIGHT FEMORAL TRIALYSIS  INSERTED SAFELY BY CONCHITA JAMISON. SITE CLEAN AND DRY. SAFETY 
MAINTAINED.

## 2019-12-15 NOTE — NUR
RT

PATIENT WAS ORALLY INTUBATED BY ER DOCTOR WITH A 7.5 ETT SECURED AT 25CM MID LIP. POSITIVE 
CO2 DETECTOR COLOR CHANGE AND BILAT CHEST RISE NOTED. BREATH COARSE THROUGHOUT. PATIENT 
PLACED ON [facility] VENT PER MD SETTINGS. ALARMS SET + AUDIBLE. AMBU BAG AT HOB

-------------------------------------------------------------------------------

Addendum: 12/15/19 at 1345 by COLEEN STINSON RT

-------------------------------------------------------------------------------

Amended: Links added.

## 2019-12-15 NOTE — NUR
RECEIVED PT INTUBATED 7.5 ETT SECURED AT 25CM AT THE LIP VIA ANCHOR FAST. NO RESP DISTRESS 
NOTED. PT TOLERATING VENT SETTINGS. SX'D FOR SML AMT OF THICK TAN SECRETIONS. VENT ALARMS 
SET AND AUDIBLE. AMBU BAG AT BEDSIDE. CONTINUE [facility] VENT SUPPORT.

-------------------------------------------------------------------------------

Addendum: 12/15/19 at 2031 by VINCENT GREGORY RT

-------------------------------------------------------------------------------

Amended: Links added.

## 2019-12-15 NOTE — NUR
MS RN NOTES



Patient O2 sat ranging between 85-88%, not increasing even on 5L of O2 via NC throughout 
night. Dr. Ball notified.

## 2019-12-16 VITALS — DIASTOLIC BLOOD PRESSURE: 61 MMHG | SYSTOLIC BLOOD PRESSURE: 109 MMHG

## 2019-12-16 VITALS — DIASTOLIC BLOOD PRESSURE: 57 MMHG | SYSTOLIC BLOOD PRESSURE: 106 MMHG

## 2019-12-16 VITALS — DIASTOLIC BLOOD PRESSURE: 66 MMHG | SYSTOLIC BLOOD PRESSURE: 116 MMHG

## 2019-12-16 VITALS — DIASTOLIC BLOOD PRESSURE: 57 MMHG | SYSTOLIC BLOOD PRESSURE: 97 MMHG

## 2019-12-16 VITALS — DIASTOLIC BLOOD PRESSURE: 62 MMHG | SYSTOLIC BLOOD PRESSURE: 116 MMHG

## 2019-12-16 VITALS — DIASTOLIC BLOOD PRESSURE: 73 MMHG | SYSTOLIC BLOOD PRESSURE: 106 MMHG

## 2019-12-16 VITALS — SYSTOLIC BLOOD PRESSURE: 110 MMHG | DIASTOLIC BLOOD PRESSURE: 58 MMHG

## 2019-12-16 VITALS — SYSTOLIC BLOOD PRESSURE: 108 MMHG | DIASTOLIC BLOOD PRESSURE: 64 MMHG

## 2019-12-16 VITALS — DIASTOLIC BLOOD PRESSURE: 51 MMHG | SYSTOLIC BLOOD PRESSURE: 94 MMHG

## 2019-12-16 VITALS — SYSTOLIC BLOOD PRESSURE: 110 MMHG | DIASTOLIC BLOOD PRESSURE: 62 MMHG

## 2019-12-16 VITALS — SYSTOLIC BLOOD PRESSURE: 107 MMHG | DIASTOLIC BLOOD PRESSURE: 58 MMHG

## 2019-12-16 VITALS — DIASTOLIC BLOOD PRESSURE: 70 MMHG | SYSTOLIC BLOOD PRESSURE: 113 MMHG

## 2019-12-16 VITALS — SYSTOLIC BLOOD PRESSURE: 106 MMHG | DIASTOLIC BLOOD PRESSURE: 47 MMHG

## 2019-12-16 VITALS — DIASTOLIC BLOOD PRESSURE: 55 MMHG | SYSTOLIC BLOOD PRESSURE: 93 MMHG

## 2019-12-16 VITALS — DIASTOLIC BLOOD PRESSURE: 54 MMHG | SYSTOLIC BLOOD PRESSURE: 100 MMHG

## 2019-12-16 VITALS — DIASTOLIC BLOOD PRESSURE: 56 MMHG | SYSTOLIC BLOOD PRESSURE: 107 MMHG

## 2019-12-16 VITALS — SYSTOLIC BLOOD PRESSURE: 107 MMHG | DIASTOLIC BLOOD PRESSURE: 65 MMHG

## 2019-12-16 VITALS — DIASTOLIC BLOOD PRESSURE: 68 MMHG | SYSTOLIC BLOOD PRESSURE: 113 MMHG

## 2019-12-16 VITALS — SYSTOLIC BLOOD PRESSURE: 127 MMHG | DIASTOLIC BLOOD PRESSURE: 60 MMHG

## 2019-12-16 VITALS — SYSTOLIC BLOOD PRESSURE: 116 MMHG | DIASTOLIC BLOOD PRESSURE: 65 MMHG

## 2019-12-16 VITALS — DIASTOLIC BLOOD PRESSURE: 64 MMHG | SYSTOLIC BLOOD PRESSURE: 115 MMHG

## 2019-12-16 VITALS — SYSTOLIC BLOOD PRESSURE: 115 MMHG | DIASTOLIC BLOOD PRESSURE: 60 MMHG

## 2019-12-16 VITALS — SYSTOLIC BLOOD PRESSURE: 99 MMHG | DIASTOLIC BLOOD PRESSURE: 54 MMHG

## 2019-12-16 VITALS — DIASTOLIC BLOOD PRESSURE: 58 MMHG | SYSTOLIC BLOOD PRESSURE: 108 MMHG

## 2019-12-16 VITALS — DIASTOLIC BLOOD PRESSURE: 67 MMHG | SYSTOLIC BLOOD PRESSURE: 124 MMHG

## 2019-12-16 VITALS — SYSTOLIC BLOOD PRESSURE: 125 MMHG | DIASTOLIC BLOOD PRESSURE: 75 MMHG

## 2019-12-16 VITALS — SYSTOLIC BLOOD PRESSURE: 106 MMHG | DIASTOLIC BLOOD PRESSURE: 56 MMHG

## 2019-12-16 VITALS — SYSTOLIC BLOOD PRESSURE: 107 MMHG | DIASTOLIC BLOOD PRESSURE: 56 MMHG

## 2019-12-16 VITALS — SYSTOLIC BLOOD PRESSURE: 119 MMHG | DIASTOLIC BLOOD PRESSURE: 67 MMHG

## 2019-12-16 VITALS — SYSTOLIC BLOOD PRESSURE: 120 MMHG | DIASTOLIC BLOOD PRESSURE: 60 MMHG

## 2019-12-16 VITALS — DIASTOLIC BLOOD PRESSURE: 60 MMHG | SYSTOLIC BLOOD PRESSURE: 116 MMHG

## 2019-12-16 VITALS — SYSTOLIC BLOOD PRESSURE: 100 MMHG | DIASTOLIC BLOOD PRESSURE: 51 MMHG

## 2019-12-16 VITALS — DIASTOLIC BLOOD PRESSURE: 62 MMHG | SYSTOLIC BLOOD PRESSURE: 114 MMHG

## 2019-12-16 VITALS — SYSTOLIC BLOOD PRESSURE: 131 MMHG | DIASTOLIC BLOOD PRESSURE: 72 MMHG

## 2019-12-16 VITALS — SYSTOLIC BLOOD PRESSURE: 116 MMHG | DIASTOLIC BLOOD PRESSURE: 60 MMHG

## 2019-12-16 VITALS — DIASTOLIC BLOOD PRESSURE: 52 MMHG | SYSTOLIC BLOOD PRESSURE: 103 MMHG

## 2019-12-16 VITALS — DIASTOLIC BLOOD PRESSURE: 57 MMHG | SYSTOLIC BLOOD PRESSURE: 100 MMHG

## 2019-12-16 VITALS — DIASTOLIC BLOOD PRESSURE: 64 MMHG | SYSTOLIC BLOOD PRESSURE: 104 MMHG

## 2019-12-16 VITALS — DIASTOLIC BLOOD PRESSURE: 57 MMHG | SYSTOLIC BLOOD PRESSURE: 103 MMHG

## 2019-12-16 VITALS — DIASTOLIC BLOOD PRESSURE: 59 MMHG | SYSTOLIC BLOOD PRESSURE: 100 MMHG

## 2019-12-16 VITALS — SYSTOLIC BLOOD PRESSURE: 129 MMHG | DIASTOLIC BLOOD PRESSURE: 74 MMHG

## 2019-12-16 VITALS — SYSTOLIC BLOOD PRESSURE: 103 MMHG | DIASTOLIC BLOOD PRESSURE: 53 MMHG

## 2019-12-16 VITALS — DIASTOLIC BLOOD PRESSURE: 52 MMHG | SYSTOLIC BLOOD PRESSURE: 99 MMHG

## 2019-12-16 VITALS — SYSTOLIC BLOOD PRESSURE: 112 MMHG | DIASTOLIC BLOOD PRESSURE: 54 MMHG

## 2019-12-16 VITALS — SYSTOLIC BLOOD PRESSURE: 109 MMHG | DIASTOLIC BLOOD PRESSURE: 61 MMHG

## 2019-12-16 VITALS — DIASTOLIC BLOOD PRESSURE: 57 MMHG | SYSTOLIC BLOOD PRESSURE: 102 MMHG

## 2019-12-16 VITALS — SYSTOLIC BLOOD PRESSURE: 123 MMHG | DIASTOLIC BLOOD PRESSURE: 64 MMHG

## 2019-12-16 VITALS — DIASTOLIC BLOOD PRESSURE: 66 MMHG | SYSTOLIC BLOOD PRESSURE: 120 MMHG

## 2019-12-16 VITALS — SYSTOLIC BLOOD PRESSURE: 148 MMHG | DIASTOLIC BLOOD PRESSURE: 76 MMHG

## 2019-12-16 VITALS — SYSTOLIC BLOOD PRESSURE: 122 MMHG | DIASTOLIC BLOOD PRESSURE: 69 MMHG

## 2019-12-16 LAB
BASE EXCESS BLDA CALC-SCNC: 0.8 MMOL/L
BASOPHILS # BLD AUTO: 0 /CMM (ref 0–0.2)
BASOPHILS NFR BLD AUTO: 0.1 % (ref 0–2)
BUN SERPL-MCNC: 46 MG/DL (ref 7–18)
CALCIUM SERPL-MCNC: 8.4 MG/DL (ref 8.5–10.1)
CHLORIDE SERPL-SCNC: 98 MMOL/L (ref 98–107)
CO2 SERPL-SCNC: 30 MMOL/L (ref 21–32)
CREAT SERPL-MCNC: 2.8 MG/DL (ref 0.6–1.3)
DO-HGB MFR BLDA: 68.7 MMHG
EOSINOPHIL NFR BLD AUTO: 0 % (ref 0–6)
GLUCOSE SERPL-MCNC: 109 MG/DL (ref 74–106)
HCT VFR BLD AUTO: 22 % (ref 39–51)
HGB BLD-MCNC: 7.6 G/DL (ref 13.5–17.5)
INHALED O2 CONCENTRATION: 30 %
INTRINSIC PEEP RESPIRATORY: 0 CM H2O
LYMPHOCYTES NFR BLD AUTO: 0.3 /CMM (ref 0.8–4.8)
LYMPHOCYTES NFR BLD AUTO: 2.3 % (ref 20–44)
MAGNESIUM SERPL-MCNC: 2.7 MG/DL (ref 1.8–2.4)
MCHC RBC AUTO-ENTMCNC: 34 G/DL (ref 31–36)
MCV RBC AUTO: 84 FL (ref 80–96)
MONOCYTES NFR BLD AUTO: 1.5 /CMM (ref 0.1–1.3)
MONOCYTES NFR BLD AUTO: 11.2 % (ref 2–12)
NEUTROPHILS # BLD AUTO: 11.7 /CMM (ref 1.8–8.9)
NEUTROPHILS NFR BLD AUTO: 86.4 % (ref 43–81)
PCO2 TEMP ADJ BLDA: 35.3 MMHG (ref 35–45)
PEEP SETTING VENT: 500 ML
PH TEMP ADJ BLDA: 7.46 [PH] (ref 7.35–7.45)
PLATELET # BLD AUTO: 178 /CMM (ref 150–450)
PO2 TEMP ADJ BLDA: 103.7 MMHG (ref 75–100)
POTASSIUM SERPL-SCNC: 3.4 MMOL/L (ref 3.5–5.1)
RBC # BLD AUTO: 2.64 MIL/UL (ref 4.5–6)
SAO2 % BLDA: 96.4 % (ref 92–98.5)
SET RATE, BG: 12
SODIUM SERPL-SCNC: 137 MMOL/L (ref 136–145)
WBC NRBC COR # BLD AUTO: 13.6 K/UL (ref 4.3–11)

## 2019-12-16 RX ADMIN — DOCUSATE SODIUM SCH MG: 50 LIQUID ORAL at 09:38

## 2019-12-16 RX ADMIN — Medication SCH APPLIC: at 09:17

## 2019-12-16 RX ADMIN — SODIUM CHLORIDE SCH MG: 9 INJECTION, SOLUTION INTRAVENOUS at 09:18

## 2019-12-16 RX ADMIN — TAMSULOSIN HYDROCHLORIDE SCH MG: 0.4 CAPSULE ORAL at 09:15

## 2019-12-16 RX ADMIN — METOPROLOL TARTRATE SCH MG: 50 TABLET, FILM COATED ORAL at 00:00

## 2019-12-16 RX ADMIN — METOPROLOL TARTRATE SCH MG: 50 TABLET, FILM COATED ORAL at 06:35

## 2019-12-16 RX ADMIN — ALBUTEROL SULFATE SCH MG: 1.25 SOLUTION RESPIRATORY (INHALATION) at 05:00

## 2019-12-16 RX ADMIN — FUROSEMIDE SCH MG: 10 INJECTION, SOLUTION INTRAMUSCULAR; INTRAVENOUS at 09:15

## 2019-12-16 RX ADMIN — ASPIRIN 81 MG SCH MG: 81 TABLET ORAL at 21:30

## 2019-12-16 RX ADMIN — Medication SCH EACH: at 18:03

## 2019-12-16 RX ADMIN — METOPROLOL TARTRATE SCH MG: 50 TABLET, FILM COATED ORAL at 20:57

## 2019-12-16 RX ADMIN — Medication SCH MG: at 19:42

## 2019-12-16 RX ADMIN — PROPOFOL PRN MLS/HR: 10 INJECTION, EMULSION INTRAVENOUS at 18:20

## 2019-12-16 RX ADMIN — Medication SCH MG: at 07:10

## 2019-12-16 RX ADMIN — Medication SCH MG: at 15:10

## 2019-12-16 RX ADMIN — Medication SCH MG: at 03:33

## 2019-12-16 RX ADMIN — MINERAL SUPPLEMENT IRON 300 MG / 5 ML STRENGTH LIQUID 100 PER BOX UNFLAVORED SCH MG: at 09:38

## 2019-12-16 RX ADMIN — PROPOFOL PRN MLS/HR: 10 INJECTION, EMULSION INTRAVENOUS at 12:16

## 2019-12-16 RX ADMIN — THERA TABS SCH UDTAB: TAB at 09:15

## 2019-12-16 RX ADMIN — Medication SCH MG: at 11:18

## 2019-12-16 RX ADMIN — FLUOXETINE HYDROCHLORIDE SCH MG: 20 CAPSULE ORAL at 09:15

## 2019-12-16 RX ADMIN — Medication SCH EACH: at 00:13

## 2019-12-16 RX ADMIN — TRAZODONE HYDROCHLORIDE SCH MG: 50 TABLET ORAL at 21:30

## 2019-12-16 RX ADMIN — Medication SCH MG: at 23:27

## 2019-12-16 RX ADMIN — ALBUTEROL SULFATE SCH MG: 1.25 SOLUTION RESPIRATORY (INHALATION) at 11:18

## 2019-12-16 RX ADMIN — PROPOFOL PRN MLS/HR: 10 INJECTION, EMULSION INTRAVENOUS at 23:11

## 2019-12-16 RX ADMIN — ATORVASTATIN CALCIUM SCH MG: 10 TABLET, FILM COATED ORAL at 09:15

## 2019-12-16 RX ADMIN — PROPOFOL PRN MLS/HR: 10 INJECTION, EMULSION INTRAVENOUS at 06:31

## 2019-12-16 RX ADMIN — ALBUTEROL SULFATE SCH MG: 1.25 SOLUTION RESPIRATORY (INHALATION) at 19:42

## 2019-12-16 RX ADMIN — ALBUTEROL SULFATE SCH MG: 1.25 SOLUTION RESPIRATORY (INHALATION) at 15:10

## 2019-12-16 RX ADMIN — Medication SCH EACH: at 11:32

## 2019-12-16 RX ADMIN — ALBUTEROL SULFATE SCH MG: 1.25 SOLUTION RESPIRATORY (INHALATION) at 23:27

## 2019-12-16 RX ADMIN — MINERAL SUPPLEMENT IRON 300 MG / 5 ML STRENGTH LIQUID 100 PER BOX UNFLAVORED SCH MG: at 16:20

## 2019-12-16 RX ADMIN — PROPOFOL PRN MLS/HR: 10 INJECTION, EMULSION INTRAVENOUS at 00:27

## 2019-12-16 RX ADMIN — Medication SCH EACH: at 06:31

## 2019-12-16 RX ADMIN — FUROSEMIDE SCH MG: 10 INJECTION, SOLUTION INTRAMUSCULAR; INTRAVENOUS at 16:20

## 2019-12-16 RX ADMIN — DOCUSATE SODIUM SCH MG: 50 LIQUID ORAL at 16:20

## 2019-12-16 RX ADMIN — ALBUTEROL SULFATE SCH MG: 1.25 SOLUTION RESPIRATORY (INHALATION) at 01:00

## 2019-12-16 RX ADMIN — CEFEPIME HYDROCHLORIDE SCH MLS/HR: 1 INJECTION, POWDER, FOR SOLUTION INTRAMUSCULAR; INTRAVENOUS at 20:56

## 2019-12-16 NOTE — NUR
RN NOTE:



Daughter Tamara was present at the bedside right now and was also informed of the plan of 
care for the patient. Patient remained sedated and intubated at this time.

## 2019-12-16 NOTE — NUR
RN NOTE:



Patient was getting hemodialysis at this time. HD nurse Tabitha was at the bedside. Patient's 
body was rechecked for any bowel movement for the day, but no bowel movement was noted. Will 
continue to monitor.

## 2019-12-16 NOTE — NUR
RN NOTE:



Bedside report was given to Edwar, ICU RN for continuity of care. Patient was still sedated 
and intubated and was receiving hemodialysis at this time. Patient was having a good amount 
of urine output from the kilgore catheter.

## 2019-12-16 NOTE — NUR
RN NOTE:



Al Jenkins DNP came and was given an update regarding the patient's condition. He ordered 
to collect stool for occult blood. Acute medical restraints [(B) soft wrist] were renewed to 
prevent the patient from pulling his tubings and lines. And a dietary consultation was 
ordered in order to start tube feeding temporarily for the patient while he is intubated. 
Verbal orders noted and carried out.

## 2019-12-16 NOTE — NUR
RN NOTE:



Received a phone call from Lew (son) and gave him an update regarding the patient's plan of 
care for today and made it clear to him that he wanted to keep the patient full code at the 
moment while he is admitted in ICU and once the patient is downgraded he wanted the code 
status to change to DNR/DNI. All providers were aware of this son's condition regarding code 
status.

## 2019-12-16 NOTE — NUR
RN ICU - NOTES - PT RECEIVED IN BED INTUBATED WITH 7.5 ETT @ 25 CM AT LIP, SEDATED ON DIP @ 
30 MCG/KG/MIN. PT DOES WAKE UP AND IS ALERT AND ABLE TO ANSWER QUESTIONS APPROPRIATELY, PT 
HAS BILATERAL SOFT WRIST RESTRAINTS TO PREVENT PT SELF EXTUBATION. PT IS TOLERATING VENT 
WELL, NO RESP DISTRESS. PT IS IN NSR. PT HAS OGT CLAMPED, PT HAS F/C WITH LARGE AMOUNT OF 
CLEAR YELLOW URINE. SKIN ISSUES NOTED, PT HAS SHANDRA MIDLINE, R THUMB 20G AND R FEM HD CATH 
WITH PIGTAIL. WILL CONTINUE TO MONITOR

## 2019-12-16 NOTE — NUR
RN NOTE:



Received patient in bed, sedated and intubated with 7.5 ETT with 26.0 cm on the lip line 
saturating 99% with current vent setting. No respiratory distress noted. Suctioned by the RT 
and no secretions were noted. HOB elevated at 30 degree. Bedside cardiac monitor showed 
sinus bradycardia= 57. Orogastric tube was in placed and flushed air for placement 
verification. (R) UA midline double lumen noted intact and patent and patient was receiving 
Diprivan 30mcg/min and patient was well sedated. (B) soft wrist restraints noted with 
patient to prevent pulling of medical tubes and lines. Afebrile. Skin warm to touch. Branham 
catheter in placed with good urinary output yellow in color draining to gravity. (R) femoral 
HD catheter with pigtail in placed last night and 1st HD was done last night as well. 
Patient on low air loss mattress for skin/wound management. Offloaded (B) feet with pillow 
at all times. Needs anticipated.

## 2019-12-16 NOTE — NUR
RN NOTE:



Patient was started with the tube feeding Nephro @20ml/hr per dietitian Alma's 
recommendation. Will monitor if patient will be tolerating it well and will advance as 
tolerated.

## 2019-12-17 VITALS — DIASTOLIC BLOOD PRESSURE: 64 MMHG | SYSTOLIC BLOOD PRESSURE: 120 MMHG

## 2019-12-17 VITALS — SYSTOLIC BLOOD PRESSURE: 118 MMHG | DIASTOLIC BLOOD PRESSURE: 62 MMHG

## 2019-12-17 VITALS — DIASTOLIC BLOOD PRESSURE: 50 MMHG | SYSTOLIC BLOOD PRESSURE: 100 MMHG

## 2019-12-17 VITALS — SYSTOLIC BLOOD PRESSURE: 125 MMHG | DIASTOLIC BLOOD PRESSURE: 59 MMHG

## 2019-12-17 VITALS — DIASTOLIC BLOOD PRESSURE: 52 MMHG | SYSTOLIC BLOOD PRESSURE: 98 MMHG

## 2019-12-17 VITALS — DIASTOLIC BLOOD PRESSURE: 52 MMHG | SYSTOLIC BLOOD PRESSURE: 106 MMHG

## 2019-12-17 VITALS — SYSTOLIC BLOOD PRESSURE: 109 MMHG | DIASTOLIC BLOOD PRESSURE: 51 MMHG

## 2019-12-17 VITALS — DIASTOLIC BLOOD PRESSURE: 55 MMHG | SYSTOLIC BLOOD PRESSURE: 106 MMHG

## 2019-12-17 VITALS — SYSTOLIC BLOOD PRESSURE: 110 MMHG | DIASTOLIC BLOOD PRESSURE: 61 MMHG

## 2019-12-17 VITALS — DIASTOLIC BLOOD PRESSURE: 66 MMHG | SYSTOLIC BLOOD PRESSURE: 120 MMHG

## 2019-12-17 VITALS — SYSTOLIC BLOOD PRESSURE: 116 MMHG | DIASTOLIC BLOOD PRESSURE: 71 MMHG

## 2019-12-17 VITALS — SYSTOLIC BLOOD PRESSURE: 113 MMHG | DIASTOLIC BLOOD PRESSURE: 53 MMHG

## 2019-12-17 VITALS — SYSTOLIC BLOOD PRESSURE: 103 MMHG | DIASTOLIC BLOOD PRESSURE: 53 MMHG

## 2019-12-17 VITALS — SYSTOLIC BLOOD PRESSURE: 91 MMHG | DIASTOLIC BLOOD PRESSURE: 45 MMHG

## 2019-12-17 VITALS — SYSTOLIC BLOOD PRESSURE: 110 MMHG | DIASTOLIC BLOOD PRESSURE: 52 MMHG

## 2019-12-17 VITALS — SYSTOLIC BLOOD PRESSURE: 115 MMHG | DIASTOLIC BLOOD PRESSURE: 53 MMHG

## 2019-12-17 VITALS — SYSTOLIC BLOOD PRESSURE: 99 MMHG | DIASTOLIC BLOOD PRESSURE: 50 MMHG

## 2019-12-17 VITALS — SYSTOLIC BLOOD PRESSURE: 127 MMHG | DIASTOLIC BLOOD PRESSURE: 52 MMHG

## 2019-12-17 VITALS — DIASTOLIC BLOOD PRESSURE: 67 MMHG | SYSTOLIC BLOOD PRESSURE: 127 MMHG

## 2019-12-17 VITALS — SYSTOLIC BLOOD PRESSURE: 116 MMHG | DIASTOLIC BLOOD PRESSURE: 65 MMHG

## 2019-12-17 VITALS — SYSTOLIC BLOOD PRESSURE: 107 MMHG | DIASTOLIC BLOOD PRESSURE: 53 MMHG

## 2019-12-17 VITALS — SYSTOLIC BLOOD PRESSURE: 121 MMHG | DIASTOLIC BLOOD PRESSURE: 63 MMHG

## 2019-12-17 VITALS — SYSTOLIC BLOOD PRESSURE: 121 MMHG | DIASTOLIC BLOOD PRESSURE: 58 MMHG

## 2019-12-17 VITALS — DIASTOLIC BLOOD PRESSURE: 45 MMHG | SYSTOLIC BLOOD PRESSURE: 109 MMHG

## 2019-12-17 VITALS — SYSTOLIC BLOOD PRESSURE: 127 MMHG | DIASTOLIC BLOOD PRESSURE: 67 MMHG

## 2019-12-17 VITALS — SYSTOLIC BLOOD PRESSURE: 118 MMHG | DIASTOLIC BLOOD PRESSURE: 63 MMHG

## 2019-12-17 LAB
BASE EXCESS BLDA CALC-SCNC: 2.6 MMOL/L
BASOPHILS # BLD AUTO: 0 /CMM (ref 0–0.2)
BASOPHILS NFR BLD AUTO: 0.2 % (ref 0–2)
BUN SERPL-MCNC: 26 MG/DL (ref 7–18)
CALCIUM SERPL-MCNC: 9.4 MG/DL (ref 8.5–10.1)
CHLORIDE SERPL-SCNC: 101 MMOL/L (ref 98–107)
CO2 SERPL-SCNC: 29 MMOL/L (ref 21–32)
CREAT SERPL-MCNC: 1.8 MG/DL (ref 0.6–1.3)
DO-HGB MFR BLDA: 64.5 MMHG
EOSINOPHIL NFR BLD AUTO: 0.1 % (ref 0–6)
GLUCOSE SERPL-MCNC: 115 MG/DL (ref 74–106)
HCT VFR BLD AUTO: 24 % (ref 39–51)
HGB BLD-MCNC: 8.1 G/DL (ref 13.5–17.5)
INHALED O2 CONCENTRATION: 30 %
LYMPHOCYTES NFR BLD AUTO: 0.4 /CMM (ref 0.8–4.8)
LYMPHOCYTES NFR BLD AUTO: 3.5 % (ref 20–44)
MCHC RBC AUTO-ENTMCNC: 34 G/DL (ref 31–36)
MCV RBC AUTO: 85 FL (ref 80–96)
MONOCYTES NFR BLD AUTO: 1.7 /CMM (ref 0.1–1.3)
MONOCYTES NFR BLD AUTO: 14.1 % (ref 2–12)
NEUTROPHILS # BLD AUTO: 10 /CMM (ref 1.8–8.9)
NEUTROPHILS NFR BLD AUTO: 82.1 % (ref 43–81)
PCO2 TEMP ADJ BLDA: 35.7 MMHG (ref 35–45)
PH TEMP ADJ BLDA: 7.48 [PH] (ref 7.35–7.45)
PLATELET # BLD AUTO: 215 /CMM (ref 150–450)
PO2 TEMP ADJ BLDA: 107.5 MMHG (ref 75–100)
POTASSIUM SERPL-SCNC: 3.2 MMOL/L (ref 3.5–5.1)
RBC # BLD AUTO: 2.8 MIL/UL (ref 4.5–6)
SAO2 % BLDA: 96.9 % (ref 92–98.5)
SODIUM SERPL-SCNC: 140 MMOL/L (ref 136–145)
VENTILATION MODE VENT: (no result)
WBC NRBC COR # BLD AUTO: 12.2 K/UL (ref 4.3–11)

## 2019-12-17 PROCEDURE — 0JBL0ZZ EXCISION OF RIGHT UPPER LEG SUBCUTANEOUS TISSUE AND FASCIA, OPEN APPROACH: ICD-10-PCS

## 2019-12-17 PROCEDURE — 0JB70ZZ EXCISION OF BACK SUBCUTANEOUS TISSUE AND FASCIA, OPEN APPROACH: ICD-10-PCS

## 2019-12-17 RX ADMIN — LEVOFLOXACIN SCH MG: 500 TABLET, FILM COATED ORAL at 22:07

## 2019-12-17 RX ADMIN — DOCUSATE SODIUM SCH MG: 50 LIQUID ORAL at 16:57

## 2019-12-17 RX ADMIN — Medication SCH EACH: at 17:03

## 2019-12-17 RX ADMIN — ASPIRIN 81 MG SCH MG: 81 TABLET ORAL at 22:07

## 2019-12-17 RX ADMIN — METOPROLOL TARTRATE SCH MG: 50 TABLET, FILM COATED ORAL at 09:24

## 2019-12-17 RX ADMIN — METOPROLOL TARTRATE SCH MG: 50 TABLET, FILM COATED ORAL at 22:07

## 2019-12-17 RX ADMIN — ATORVASTATIN CALCIUM SCH MG: 10 TABLET, FILM COATED ORAL at 09:22

## 2019-12-17 RX ADMIN — TAMSULOSIN HYDROCHLORIDE SCH MG: 0.4 CAPSULE ORAL at 09:21

## 2019-12-17 RX ADMIN — Medication SCH APPLIC: at 09:24

## 2019-12-17 RX ADMIN — Medication SCH EACH: at 00:50

## 2019-12-17 RX ADMIN — SODIUM CHLORIDE SCH MG: 9 INJECTION, SOLUTION INTRAVENOUS at 10:14

## 2019-12-17 RX ADMIN — TRAZODONE HYDROCHLORIDE SCH MG: 50 TABLET ORAL at 22:07

## 2019-12-17 RX ADMIN — FLUOXETINE HYDROCHLORIDE SCH MG: 20 CAPSULE ORAL at 09:23

## 2019-12-17 RX ADMIN — FUROSEMIDE SCH MG: 10 INJECTION, SOLUTION INTRAMUSCULAR; INTRAVENOUS at 16:57

## 2019-12-17 RX ADMIN — MINERAL SUPPLEMENT IRON 300 MG / 5 ML STRENGTH LIQUID 100 PER BOX UNFLAVORED SCH MG: at 16:57

## 2019-12-17 RX ADMIN — THERA TABS SCH UDTAB: TAB at 09:22

## 2019-12-17 RX ADMIN — Medication SCH MG: at 09:02

## 2019-12-17 RX ADMIN — ALBUTEROL SULFATE SCH MG: 1.25 SOLUTION RESPIRATORY (INHALATION) at 09:02

## 2019-12-17 RX ADMIN — Medication SCH EACH: at 23:35

## 2019-12-17 RX ADMIN — Medication SCH MG: at 15:41

## 2019-12-17 RX ADMIN — DOCUSATE SODIUM SCH MG: 50 LIQUID ORAL at 09:22

## 2019-12-17 RX ADMIN — ALBUTEROL SULFATE SCH MG: 1.25 SOLUTION RESPIRATORY (INHALATION) at 23:07

## 2019-12-17 RX ADMIN — ALBUTEROL SULFATE SCH MG: 1.25 SOLUTION RESPIRATORY (INHALATION) at 15:41

## 2019-12-17 RX ADMIN — MINERAL SUPPLEMENT IRON 300 MG / 5 ML STRENGTH LIQUID 100 PER BOX UNFLAVORED SCH MG: at 09:22

## 2019-12-17 RX ADMIN — Medication SCH MG: at 11:18

## 2019-12-17 RX ADMIN — Medication SCH EACH: at 11:53

## 2019-12-17 RX ADMIN — FUROSEMIDE SCH MG: 10 INJECTION, SOLUTION INTRAMUSCULAR; INTRAVENOUS at 09:21

## 2019-12-17 RX ADMIN — Medication SCH MG: at 23:07

## 2019-12-17 RX ADMIN — CEFEPIME HYDROCHLORIDE SCH MLS/HR: 1 INJECTION, POWDER, FOR SOLUTION INTRAMUSCULAR; INTRAVENOUS at 18:54

## 2019-12-17 RX ADMIN — ALBUTEROL SULFATE SCH MG: 1.25 SOLUTION RESPIRATORY (INHALATION) at 11:18

## 2019-12-17 RX ADMIN — Medication SCH MG: at 19:58

## 2019-12-17 RX ADMIN — ALBUTEROL SULFATE SCH MG: 1.25 SOLUTION RESPIRATORY (INHALATION) at 19:58

## 2019-12-17 NOTE — NUR
RT

PER DR ALCANTAR PATIENT WAS WEANED AND EXTUBATED. PATIENT PLACED ON 2L N/C PRINCE WELL. REY GARIBAY

## 2019-12-17 NOTE — NUR
tele lvn: notes



received pt from icu via bed awake, a/ox4. s/p extubation this morning and s/p excisional 
debridement to right ischial wound. vss. oriented to room and surroundings. has right 
femoral hd cath. noted with right upper arm midline intact. f/c draining to gravity. place 
pt on tele sr=78. on o2 at 2l/min via n/c. no apparent distress noted. tray brought with pt 
and assist by cna with meal. will continue to monitor.

## 2019-12-17 NOTE — NUR
tele lvn: notes



daughter here visiting at this time. called agnieszka (icu nurse) for belongings, stated, "pt 
has no belongings when he was transferred here. spoke to daughter and ask if she brought all 
his belongings when he was here and says that she brought all his valuables and clothes back 
home. pt aware. new pt property management form filed and place in chart.

## 2019-12-17 NOTE — NUR
RN NOTE:



TERESA Henderson was informed that the patient's POLST will be change from full code to DNR/DNI 
per Lew, (son) request. Patient came in with a POLST of DNR/DNI filed on the patient's 
chart.

## 2019-12-17 NOTE — NUR
RN NOTE:



Dr. Bajwa was in the unit and was informed that the Diprivan was already titrated down for 
the CPAP mode. Instructions were given to the RT Tripp for the possible extubation of the 
patient. Patient was awake, alert and able to understand simple instructions. He was 
informed that the RT will do a weaning trial on the mechanical ventilator and possible 
extubation if he does well with it. Patient's (B) soft wrist restraints were released and 
kept a close monitoring on the patient. ABG s/p extubation was ordered by Dr. Bajwa to the 
RT.

## 2019-12-17 NOTE — NUR
RN NOTE:



Pablito, speech therapist performed a swallow evaluation for the patient and she recommended 
that the patient can be started with Renal Standard Soft diet for lunch. Late lunch was 
ordered for the patient. Dr. Pompa was aware that the patient passed the swallow 
evaluation.

## 2019-12-17 NOTE — NUR
RN NOTE:



Received patient in bed, sedated and intubated with 7.5 ETT with 26.0 cm on the lip line 
saturating 100% with current vent setting. No respiratory distress noted. RT suctioned the 
patient and small thin secretions were noted. HOB elevated at 30 degree. Bedside cardiac 
monitor showed sinus rhythm= 80. Orogastric tube was in placed and flushed air for placement 
verification. Patient was receiving the Nephro @20ml/hr with no gastric residual noted. (R) 
UA midline double lumen noted intact and patent and patient was receiving Diprivan 40mcg/min 
and patient was well sedated. (B) soft wrist restraints noted with patient to prevent 
pulling of medical tubes and lines. Afebrile. Skin warm to touch. Branham catheter in placed 
with good urine output yellow in color draining to gravity. (R) femoral HD catheter with 
double lumen pigtail in placed with clean and dry dressing. Patient on low air loss mattress 
for skin/wound management. Offloaded (B) feet with pillow at all times. Needs anticipated. 
Will follow-up with the RT regarding the plan of putting the patient in CPAP mode per Dr. Bajwa.

## 2019-12-17 NOTE — NUR
RN NOTE:



Patient was successfully extubated from the mechanical ventilator and ABG results were shown 
to Dr. Bajwa by RT Almaguer. Patient's daughter Tamara was at the bedside and was aware of 
the plan of care for the patient.

## 2019-12-17 NOTE — NUR
RN NOTE:



Bedside swallow evaluation was performed by the RN and patient was able to tolerate the ice 
chips. No coughing or gagging was noted. Patient was kept NPO except meds and a swallow 
evaluation with the speech therapist was ordered. Patient and daughter Tamara was aware of 
this.

## 2019-12-17 NOTE — NUR
RN NOTES





RECEIVED PATIENT ASLEEP EASILY AROUSABLE, ALERT ORIENTED X4. SAFETY MEASURES IN PLACE, 
ASPIRATION PRECAUTION EMPHASIZED. BED IN LOW LOCKED POSITION, CALL LIGHT WITHIN EASY REACH. 
IV ACCESS INTACT AND PATENT. DENIES ANY PAIN, MINIMAL DISCOMFORT NOTED, REPOSITIONED, KEPT 
CLEAN DRY AND COMFORTABLE, TELE MONITOR READS SINUS RHYTHM 80s. ALL NEEDS ATTENDED. WILL 
CONTINUE TO MONITOR ACCORDINGLY.

## 2019-12-17 NOTE — NUR
RN NOTE:



Called and spoke with Santiago from [facility] and gave him a report regarding the patient's 
possible downgrade transfer to telemetry room 304 anytime today. Patient was aware of the 
downgrade order. Bed bath was provided to the patient and he was still noted with no bowel 
movement within the shift. Will continue to monitor.

## 2019-12-17 NOTE — NUR
RN NOTE:



Patient was transferred to Room 304-1 and hand-off the report to TERESA Henderson for continuity 
of care. Daughter Tamara and son Lew were both informed of the patient's transfer to 
another unit. Medications were released with the patient upon transfer. Patient was on 
stable condition and was tolerating the NC 2L/min saturating 100% with no respiratory 
distress.

## 2019-12-18 VITALS — DIASTOLIC BLOOD PRESSURE: 60 MMHG | SYSTOLIC BLOOD PRESSURE: 118 MMHG

## 2019-12-18 VITALS — SYSTOLIC BLOOD PRESSURE: 121 MMHG | DIASTOLIC BLOOD PRESSURE: 65 MMHG

## 2019-12-18 VITALS — SYSTOLIC BLOOD PRESSURE: 123 MMHG | DIASTOLIC BLOOD PRESSURE: 70 MMHG

## 2019-12-18 VITALS — DIASTOLIC BLOOD PRESSURE: 76 MMHG | SYSTOLIC BLOOD PRESSURE: 123 MMHG

## 2019-12-18 VITALS — DIASTOLIC BLOOD PRESSURE: 59 MMHG | SYSTOLIC BLOOD PRESSURE: 130 MMHG

## 2019-12-18 VITALS — DIASTOLIC BLOOD PRESSURE: 76 MMHG | SYSTOLIC BLOOD PRESSURE: 134 MMHG

## 2019-12-18 VITALS — SYSTOLIC BLOOD PRESSURE: 127 MMHG | DIASTOLIC BLOOD PRESSURE: 67 MMHG

## 2019-12-18 RX ADMIN — MINERAL SUPPLEMENT IRON 300 MG / 5 ML STRENGTH LIQUID 100 PER BOX UNFLAVORED SCH MG: at 17:00

## 2019-12-18 RX ADMIN — ATORVASTATIN CALCIUM SCH MG: 10 TABLET, FILM COATED ORAL at 08:36

## 2019-12-18 RX ADMIN — MINERAL SUPPLEMENT IRON 300 MG / 5 ML STRENGTH LIQUID 100 PER BOX UNFLAVORED SCH MG: at 08:35

## 2019-12-18 RX ADMIN — FUROSEMIDE SCH MG: 10 INJECTION, SOLUTION INTRAMUSCULAR; INTRAVENOUS at 08:37

## 2019-12-18 RX ADMIN — Medication SCH APPLIC: at 08:48

## 2019-12-18 RX ADMIN — CEFEPIME HYDROCHLORIDE SCH MLS/HR: 1 INJECTION, POWDER, FOR SOLUTION INTRAMUSCULAR; INTRAVENOUS at 18:48

## 2019-12-18 RX ADMIN — Medication SCH EACH: at 07:32

## 2019-12-18 RX ADMIN — FLUCONAZOLE SCH MG: 100 TABLET ORAL at 20:18

## 2019-12-18 RX ADMIN — Medication PRN MG: at 16:26

## 2019-12-18 RX ADMIN — TAMSULOSIN HYDROCHLORIDE SCH MG: 0.4 CAPSULE ORAL at 08:35

## 2019-12-18 RX ADMIN — THERA TABS SCH UDTAB: TAB at 08:35

## 2019-12-18 RX ADMIN — Medication SCH MG: at 15:05

## 2019-12-18 RX ADMIN — Medication SCH EACH: at 22:20

## 2019-12-18 RX ADMIN — Medication SCH MG: at 23:04

## 2019-12-18 RX ADMIN — ALBUTEROL SULFATE SCH MG: 1.25 SOLUTION RESPIRATORY (INHALATION) at 08:11

## 2019-12-18 RX ADMIN — ALBUTEROL SULFATE SCH MG: 1.25 SOLUTION RESPIRATORY (INHALATION) at 20:36

## 2019-12-18 RX ADMIN — DOCUSATE SODIUM SCH MG: 50 LIQUID ORAL at 08:35

## 2019-12-18 RX ADMIN — ALBUTEROL SULFATE SCH MG: 1.25 SOLUTION RESPIRATORY (INHALATION) at 03:03

## 2019-12-18 RX ADMIN — FUROSEMIDE SCH MG: 10 INJECTION, SOLUTION INTRAMUSCULAR; INTRAVENOUS at 16:13

## 2019-12-18 RX ADMIN — Medication SCH MG: at 03:03

## 2019-12-18 RX ADMIN — FLUOXETINE HYDROCHLORIDE SCH MG: 20 CAPSULE ORAL at 08:36

## 2019-12-18 RX ADMIN — ASPIRIN 81 MG SCH MG: 81 TABLET ORAL at 22:20

## 2019-12-18 RX ADMIN — ALBUTEROL SULFATE SCH MG: 1.25 SOLUTION RESPIRATORY (INHALATION) at 23:05

## 2019-12-18 RX ADMIN — METOPROLOL TARTRATE SCH MG: 50 TABLET, FILM COATED ORAL at 08:37

## 2019-12-18 RX ADMIN — Medication SCH MG: at 08:11

## 2019-12-18 RX ADMIN — Medication SCH EACH: at 17:14

## 2019-12-18 RX ADMIN — ALBUTEROL SULFATE SCH MG: 1.25 SOLUTION RESPIRATORY (INHALATION) at 15:05

## 2019-12-18 RX ADMIN — INSULIN HUMAN PRN UNIT: 100 INJECTION, SOLUTION PARENTERAL at 11:38

## 2019-12-18 RX ADMIN — TRAZODONE HYDROCHLORIDE SCH MG: 50 TABLET ORAL at 22:20

## 2019-12-18 RX ADMIN — SODIUM CHLORIDE SCH MG: 9 INJECTION, SOLUTION INTRAVENOUS at 08:37

## 2019-12-18 RX ADMIN — METOPROLOL TARTRATE SCH MG: 50 TABLET, FILM COATED ORAL at 20:18

## 2019-12-18 RX ADMIN — Medication SCH EACH: at 11:39

## 2019-12-18 RX ADMIN — DOCUSATE SODIUM SCH MG: 50 LIQUID ORAL at 17:00

## 2019-12-18 RX ADMIN — Medication SCH MG: at 20:36

## 2019-12-18 RX ADMIN — ALBUTEROL SULFATE SCH MG: 1.25 SOLUTION RESPIRATORY (INHALATION) at 11:24

## 2019-12-18 RX ADMIN — Medication SCH MG: at 11:24

## 2019-12-18 NOTE — NUR
Tele RN Closing NOtes



Patient in bed, alert and oriented x4. Calm, cooperative and pleasant. No complaints of pain 
or discomfort noted. Breth sounds clear. Breathing even and unlabored. No cardiac or 
respiratory distress noted. EKG shows Normal sinus rythym. Kept clean and dry and 
comfortable. No concerns at this time. Administered 7pm dose of  Cefepime. Midline noted on 
R brachial. dressing intact. NO swelling/edema. No s/s of infiltration or infection noted. 
Tolerated IV Cefepime well, with no ASE noted. Daughter at bedside at this time.

## 2019-12-18 NOTE — NUR
RN OPENING NOTES



Patient received on 2l o2, no sob noted, patient denies pain at this time.  Patient 
comfortable in his bed.  Patient remains a/o x4 and is on tele with SR 60-80's.  Right 
femoral HD CATH, R upper arm midline present and is patent.  Bed at the lowest setting, call 
light within reach, side rails up x2.  

-------------------------------------------------------------------------------

Addendum: 12/18/19 at 0804 by ABDIFATAH SEE RN

-------------------------------------------------------------------------------

Branham cath present at this time and is draining with no blood noted.

## 2019-12-18 NOTE — NUR
TELE RN OPENING NOTES

PATIENT AWAKE AND RESTING IN BED. ALERT & ORIENTED X 4. ON 2L NC. TELEMONITOR SINUS RHYTHM 
HR: 77. CLARK CATH PRESET, INTACT & PATENT, 400 CC OF URINE PRESENT, YELLOW AND CLEAR. 
MIDLINE ON RIGHT UPPER ARM PATENT AND INTACT. NO S/S OF ACUTE RESPIRATORY DISTRESS AND NO 
COMPLAINTS OF PAIN AT THIS TIME. BED SET IN LOWEST POSITION AND LOCKED, SET IN SEMI-FREEDMAN'S 
POSITION FOR ASPIRATION PRECAUTION. CALL LIGHT WITHIN REACH. WILL CONTINUE TO MONITOR.

## 2019-12-18 NOTE — NUR
RN NOTES





PATIENT ASLEEP EASILY AROUSABLE, ALERT ORIENTED X4. SAFETY MEASURES IN PLACE, ASPIRATION 
PRECAUTION EMPHASIZED. BED IN LOW LOCKED POSITION, CALL LIGHT WITHIN EASY REACH. IV ACCESS 
INTACT AND PATENT. DENIES ANY PAIN, MINIMAL DISCOMFORT NOTED, REPOSITIONED, KEPT CLEAN DRY 
AND COMFORTABLE, TELE MONITOR READS SINUS RHYTHM 80s. ALL NEEDS ATTENDED.WILL ENDORSED TO AM 
NURSE FOR CONTINUITY OF CARE.

## 2019-12-19 VITALS — SYSTOLIC BLOOD PRESSURE: 118 MMHG | DIASTOLIC BLOOD PRESSURE: 68 MMHG

## 2019-12-19 VITALS — SYSTOLIC BLOOD PRESSURE: 116 MMHG | DIASTOLIC BLOOD PRESSURE: 68 MMHG

## 2019-12-19 VITALS — SYSTOLIC BLOOD PRESSURE: 108 MMHG | DIASTOLIC BLOOD PRESSURE: 58 MMHG

## 2019-12-19 VITALS — DIASTOLIC BLOOD PRESSURE: 66 MMHG | SYSTOLIC BLOOD PRESSURE: 117 MMHG

## 2019-12-19 VITALS — DIASTOLIC BLOOD PRESSURE: 73 MMHG | SYSTOLIC BLOOD PRESSURE: 136 MMHG

## 2019-12-19 VITALS — SYSTOLIC BLOOD PRESSURE: 117 MMHG | DIASTOLIC BLOOD PRESSURE: 66 MMHG

## 2019-12-19 VITALS — DIASTOLIC BLOOD PRESSURE: 68 MMHG | SYSTOLIC BLOOD PRESSURE: 118 MMHG

## 2019-12-19 LAB
BASOPHILS # BLD AUTO: 0.1 /CMM (ref 0–0.2)
BASOPHILS NFR BLD AUTO: 0.5 % (ref 0–2)
BUN SERPL-MCNC: 31 MG/DL (ref 7–18)
CALCIUM SERPL-MCNC: 8.3 MG/DL (ref 8.5–10.1)
CHLORIDE SERPL-SCNC: 97 MMOL/L (ref 98–107)
CO2 SERPL-SCNC: 33 MMOL/L (ref 21–32)
CREAT SERPL-MCNC: 1.5 MG/DL (ref 0.6–1.3)
EOSINOPHIL NFR BLD AUTO: 0.1 % (ref 0–6)
GLUCOSE SERPL-MCNC: 186 MG/DL (ref 74–106)
HCT VFR BLD AUTO: 29 % (ref 39–51)
HGB BLD-MCNC: 9.7 G/DL (ref 13.5–17.5)
LYMPHOCYTES NFR BLD AUTO: 0.9 /CMM (ref 0.8–4.8)
LYMPHOCYTES NFR BLD AUTO: 5.7 % (ref 20–44)
MCHC RBC AUTO-ENTMCNC: 33 G/DL (ref 31–36)
MCV RBC AUTO: 85 FL (ref 80–96)
MONOCYTES NFR BLD AUTO: 1.3 /CMM (ref 0.1–1.3)
MONOCYTES NFR BLD AUTO: 8.1 % (ref 2–12)
NEUTROPHILS # BLD AUTO: 13.8 /CMM (ref 1.8–8.9)
NEUTROPHILS NFR BLD AUTO: 85.6 % (ref 43–81)
PLATELET # BLD AUTO: 254 /CMM (ref 150–450)
POTASSIUM SERPL-SCNC: 3.4 MMOL/L (ref 3.5–5.1)
RBC # BLD AUTO: 3.41 MIL/UL (ref 4.5–6)
SODIUM SERPL-SCNC: 137 MMOL/L (ref 136–145)
WBC NRBC COR # BLD AUTO: 16 K/UL (ref 4.3–11)

## 2019-12-19 RX ADMIN — Medication SCH EACH: at 17:03

## 2019-12-19 RX ADMIN — METOPROLOL TARTRATE SCH MG: 50 TABLET, FILM COATED ORAL at 20:29

## 2019-12-19 RX ADMIN — FUROSEMIDE SCH MG: 10 INJECTION, SOLUTION INTRAMUSCULAR; INTRAVENOUS at 16:16

## 2019-12-19 RX ADMIN — Medication SCH MG: at 19:30

## 2019-12-19 RX ADMIN — DEXTROSE MONOHYDRATE PRN MG: 50 INJECTION, SOLUTION INTRAVENOUS at 12:09

## 2019-12-19 RX ADMIN — Medication SCH EACH: at 21:07

## 2019-12-19 RX ADMIN — TAMSULOSIN HYDROCHLORIDE SCH MG: 0.4 CAPSULE ORAL at 08:34

## 2019-12-19 RX ADMIN — Medication SCH MG: at 14:54

## 2019-12-19 RX ADMIN — ALBUTEROL SULFATE SCH MG: 1.25 SOLUTION RESPIRATORY (INHALATION) at 19:30

## 2019-12-19 RX ADMIN — SODIUM CHLORIDE SCH MG: 9 INJECTION, SOLUTION INTRAVENOUS at 08:34

## 2019-12-19 RX ADMIN — ATORVASTATIN CALCIUM SCH MG: 10 TABLET, FILM COATED ORAL at 08:33

## 2019-12-19 RX ADMIN — Medication SCH EACH: at 06:21

## 2019-12-19 RX ADMIN — Medication SCH MG: at 23:06

## 2019-12-19 RX ADMIN — FLUOXETINE HYDROCHLORIDE SCH MG: 20 CAPSULE ORAL at 08:34

## 2019-12-19 RX ADMIN — ALBUTEROL SULFATE SCH MG: 1.25 SOLUTION RESPIRATORY (INHALATION) at 02:57

## 2019-12-19 RX ADMIN — FUROSEMIDE SCH MG: 10 INJECTION, SOLUTION INTRAMUSCULAR; INTRAVENOUS at 08:35

## 2019-12-19 RX ADMIN — ALBUTEROL SULFATE SCH MG: 1.25 SOLUTION RESPIRATORY (INHALATION) at 14:54

## 2019-12-19 RX ADMIN — Medication PRN MG: at 09:59

## 2019-12-19 RX ADMIN — Medication SCH MG: at 20:25

## 2019-12-19 RX ADMIN — Medication SCH EACH: at 12:08

## 2019-12-19 RX ADMIN — INSULIN HUMAN PRN UNIT: 100 INJECTION, SOLUTION PARENTERAL at 12:28

## 2019-12-19 RX ADMIN — ALBUTEROL SULFATE SCH MG: 1.25 SOLUTION RESPIRATORY (INHALATION) at 07:53

## 2019-12-19 RX ADMIN — Medication SCH MG: at 16:17

## 2019-12-19 RX ADMIN — ASPIRIN 81 MG SCH MG: 81 TABLET ORAL at 21:03

## 2019-12-19 RX ADMIN — FLUCONAZOLE SCH MG: 100 TABLET ORAL at 20:29

## 2019-12-19 RX ADMIN — INSULIN HUMAN PRN UNIT: 100 INJECTION, SOLUTION PARENTERAL at 21:16

## 2019-12-19 RX ADMIN — THERA TABS SCH UDTAB: TAB at 08:34

## 2019-12-19 RX ADMIN — Medication SCH MG: at 11:09

## 2019-12-19 RX ADMIN — Medication SCH MG: at 07:53

## 2019-12-19 RX ADMIN — Medication SCH MG: at 02:57

## 2019-12-19 RX ADMIN — ALBUTEROL SULFATE SCH MG: 1.25 SOLUTION RESPIRATORY (INHALATION) at 23:06

## 2019-12-19 RX ADMIN — MAGNESIUM HYDROXIDE PRN ML: 400 SUSPENSION ORAL at 03:59

## 2019-12-19 RX ADMIN — Medication SCH APPLIC: at 08:35

## 2019-12-19 RX ADMIN — ALBUTEROL SULFATE SCH MG: 1.25 SOLUTION RESPIRATORY (INHALATION) at 11:09

## 2019-12-19 RX ADMIN — FERROUS SULFATE TAB 325 MG (65 MG ELEMENTAL FE) SCH MG: 325 (65 FE) TAB at 16:17

## 2019-12-19 RX ADMIN — TRAZODONE HYDROCHLORIDE SCH MG: 50 TABLET ORAL at 21:03

## 2019-12-19 RX ADMIN — Medication SCH MG: at 08:34

## 2019-12-19 RX ADMIN — ALBUTEROL SULFATE SCH MG: 1.25 SOLUTION RESPIRATORY (INHALATION) at 20:25

## 2019-12-19 RX ADMIN — METOPROLOL TARTRATE SCH MG: 50 TABLET, FILM COATED ORAL at 08:34

## 2019-12-19 RX ADMIN — FERROUS SULFATE TAB 325 MG (65 MG ELEMENTAL FE) SCH MG: 325 (65 FE) TAB at 08:34

## 2019-12-19 NOTE — NUR
MS RN OPENING NOTES

PATIENT RESTING IN BED, SEMI-FREEDMAN'S POSITION. ALERT & ORIENTED X 4. ON 2L NC. NO S/S OF 
ACUTE RESPIRATORY DISTRESS OR SOB. NO COMPLAINTS OF PAIN AT THIS TIME. MIDLINE ON RIGHT 
UPPER ARM INTACT & PATENT WITH NS RUNNING AT 10CC/HR. CLARK CATH INTACT & PATENT, 350 CC OF 
OUTPUT, CLEAR AND YELLOW. BED SET IN LOWEST POSITION & LOCKED, UPPER SIDE RAILS RAISED. CALL 
LIGHT WITHIN REACH. WILL CONTINUE TO MONITOR.

## 2019-12-19 NOTE — NUR
Tele RN Closing notes



Patient in bed. awake, alert and oriented. NO cardiac or respiratory distress noted. Breath 
sounds clear, breathing even and unlabored. FC cath patent, draining with clear, yellow 
urine. Midline cath noted on R brachial. Dressing intact. NO edema, no s/s of infection or 
infiltration noted. PT calm and cooperative with all nursing tx. Cardiac monitor was d/cd by 
cardiologist today. All due medications give during shift. Tolerated well with no ase noted.

## 2019-12-19 NOTE — NUR
Tele RN opening notes



Received patient in bed, awake, alert and oriented x4. Pleasant, calm and cooperative. Branham 
catheter draining with clear, yellow urine. On O2 at 2L/NC, saturating at 98%. Normal sinus 
rhythm. No cardiac or respiratory distress noted. Lung sounds clear, breathing even and 
unlabored. Administered all due meds this AM given. Tolerated weel with no ASE noted PICC 
line noted on R brachial. dressing clean and intact. No s/s of swelling/edema on site. No 
s/sx of infiltration noted. Dr. arteaga was here this AM to see the patient.

## 2019-12-19 NOTE — NUR
TELE RN CLOSING NOTES

PATIENT AWAKE AND RESTING IN BED. ALERT & ORIENTED X 4. ON 2L NC. NO S/S OF ACUTE 
RESPIRATORY DISTRESS. NO COMPLAINTS OF SOB OR PAIN AT THIS TIME. MIDLINE ON RIGHT UPPER ARM, 
INTACT & PATENT, NS RUNNING AT 10CC/HR. BED SET IN IN LOWEST POSITION & LOCKED. CALL LIGHT 
WITHIN REACH. WILL ENDORSE TO DAY SHIFT NURSE TO FOLLOW PLAN OF CARE.

## 2019-12-20 VITALS — DIASTOLIC BLOOD PRESSURE: 88 MMHG | SYSTOLIC BLOOD PRESSURE: 126 MMHG

## 2019-12-20 VITALS — DIASTOLIC BLOOD PRESSURE: 60 MMHG | SYSTOLIC BLOOD PRESSURE: 134 MMHG

## 2019-12-20 VITALS — SYSTOLIC BLOOD PRESSURE: 104 MMHG | DIASTOLIC BLOOD PRESSURE: 53 MMHG

## 2019-12-20 RX ADMIN — Medication SCH MG: at 08:38

## 2019-12-20 RX ADMIN — SODIUM CHLORIDE SCH MG: 9 INJECTION, SOLUTION INTRAVENOUS at 08:41

## 2019-12-20 RX ADMIN — FLUCONAZOLE SCH MG: 100 TABLET ORAL at 19:21

## 2019-12-20 RX ADMIN — Medication SCH MG: at 12:37

## 2019-12-20 RX ADMIN — Medication SCH EACH: at 16:41

## 2019-12-20 RX ADMIN — Medication SCH MG: at 15:07

## 2019-12-20 RX ADMIN — ALBUTEROL SULFATE SCH MG: 1.25 SOLUTION RESPIRATORY (INHALATION) at 15:08

## 2019-12-20 RX ADMIN — ALBUTEROL SULFATE SCH MG: 1.25 SOLUTION RESPIRATORY (INHALATION) at 03:01

## 2019-12-20 RX ADMIN — ALBUTEROL SULFATE SCH MG: 1.25 SOLUTION RESPIRATORY (INHALATION) at 12:36

## 2019-12-20 RX ADMIN — ALBUTEROL SULFATE SCH MG: 1.25 SOLUTION RESPIRATORY (INHALATION) at 19:30

## 2019-12-20 RX ADMIN — METOPROLOL TARTRATE SCH MG: 50 TABLET, FILM COATED ORAL at 08:56

## 2019-12-20 RX ADMIN — THERA TABS SCH UDTAB: TAB at 08:42

## 2019-12-20 RX ADMIN — INSULIN HUMAN PRN UNIT: 100 INJECTION, SOLUTION PARENTERAL at 11:42

## 2019-12-20 RX ADMIN — Medication SCH MG: at 16:37

## 2019-12-20 RX ADMIN — Medication SCH MG: at 03:01

## 2019-12-20 RX ADMIN — Medication SCH EACH: at 06:22

## 2019-12-20 RX ADMIN — Medication SCH EACH: at 11:35

## 2019-12-20 RX ADMIN — ALBUTEROL SULFATE SCH MG: 1.25 SOLUTION RESPIRATORY (INHALATION) at 08:38

## 2019-12-20 RX ADMIN — ATORVASTATIN CALCIUM SCH MG: 10 TABLET, FILM COATED ORAL at 08:42

## 2019-12-20 RX ADMIN — FLUOXETINE HYDROCHLORIDE SCH MG: 20 CAPSULE ORAL at 08:42

## 2019-12-20 RX ADMIN — Medication SCH MG: at 19:30

## 2019-12-20 RX ADMIN — FUROSEMIDE SCH MG: 10 INJECTION, SOLUTION INTRAMUSCULAR; INTRAVENOUS at 16:47

## 2019-12-20 RX ADMIN — FUROSEMIDE SCH MG: 10 INJECTION, SOLUTION INTRAMUSCULAR; INTRAVENOUS at 08:55

## 2019-12-20 RX ADMIN — Medication SCH MG: at 08:42

## 2019-12-20 RX ADMIN — FERROUS SULFATE TAB 325 MG (65 MG ELEMENTAL FE) SCH MG: 325 (65 FE) TAB at 08:43

## 2019-12-20 RX ADMIN — TAMSULOSIN HYDROCHLORIDE SCH MG: 0.4 CAPSULE ORAL at 08:42

## 2019-12-20 RX ADMIN — Medication SCH APPLIC: at 08:43

## 2019-12-20 RX ADMIN — FERROUS SULFATE TAB 325 MG (65 MG ELEMENTAL FE) SCH MG: 325 (65 FE) TAB at 16:37

## 2019-12-20 NOTE — NUR
MS RN NOTE



RECEIVED PT IN STABLE CONDITION A/O X4, CURRENTLY IN BED WATCHING TV. NO SIGNS OF SOB OR 
DISTRESS, NO C/O PAIN OR N/V. SHANDRA MIDLINE #18 IN PLACE S/L. CLARK NOTED, IN PLACE AND 
INTACT, WITH CLEAR YELLOW URINE DRAINING. ALL CURRENT NEEDS ATTENDED TO. BED LOW, LOCKED, 
UPPER RAILS UP, AND CALL LIGHT WITHIN REACH. WILL CONT. TO MONITOR. PER REPORT, PT TO BE 
DISCHARGED AT 2000 TO BE TAKEN TO [facility]. PT AWARE.

## 2019-12-20 NOTE — NUR
MS RN OPENING NOTES



RECEIVED PATIENT IN BED RESTING COMFORTABLY IN MODERATE HIGH BACK REST. ALERT & ORIENTED X 
4. ON 2L NC. NO S/S OF ACUTE RESPIRATORY DISTRESS OR SOB AT THIS TIME. NO COMPLAINTS OF PAIN 
AT THIS TIME.  NOTED WITH MIDLINE ON RIGHT UPPER ARM INTACT & PATENT WITH NS RUNNING AT 
10CC/HR. CLARK CATH INTACT & PATENT, DRAINING FREELY WITH CLEAR AND YELLOW URINE OUTPUT. 
SAFETY MEASURES IN PLACE, BED SET IN LOWEST POSITION & LOCKED, UPPER SIDE RAILS RAISED. CALL 
LIGHT WITHIN REACH. WILL CONTINUE TO MONITOR.

## 2019-12-20 NOTE — NUR
MS RN CLOSING NOTES

PATIENT AWAKE AND RESTING IN BED. ALERT & ORIENTED X 4. ON 2L NC. NO S/S OF ACUTE 
RESPIRATORY DISTRESS AND NO COMPLAINTS OF PAIN AT THIS TIME. MIDLINE ON RIGHT UPPER ARM 
INTACT & PATENT W/ NS RUNNING AT 10CC/HR. BED LOCKED & SIDE RAILS UP X2. CALL LIGHT WITHIN 
REACH. WILL ENDORSE TO DAY SHIFT NURSE TO FOLLOW PLAN OF CARE.

## 2019-12-20 NOTE — NUR
RN CLOSING NOTES



PATIENT IN BED RESTING COMFORTABLY IN MODERATE HIGH BACK REST. A/O X4. NO SIGNS OF DISTRESS 
NOTED THROUGHOUT THE SHIFT. ALL NURSING NEEDS PROVIDED. ENDORSE TO NIGHT SHIFT NURSE FOR 
DISCHARGE.  TIME 20:00.

## 2019-12-20 NOTE — NUR
MS RN NOTE



PT DISCHARGED FROM MS FLOOR IN STABLE CONDITION A/O X4, NO SIGNS OF SOB OR DISTRESS, NO C/O 
PAIN OR N/V. PT LEFT VIA GURNEY WITH AMBULANCE STAFF YAQUELIN. TO BE TAKEN TO [facility]. SHANDRA 
MIDLINE AND ID BAND REMOVED. BELONGINGS ACCOUNTED AND SIGNED FOR, SKIN ASSESSED AND PHOTOS 
TAKEN. PT SIGNED DISCHARGE PAPERWORK AND TAKEN WITH HIM TO SNF. REPORT WAS GIVEN BY DAYSHIFT 
NURSE TO SNF.

## 2020-01-27 ENCOUNTER — HOSPITAL ENCOUNTER (INPATIENT)
Dept: HOSPITAL 54 - ER | Age: 80
LOS: 7 days | Discharge: NURSING, REHAB OR LONG-TERM CARE | DRG: 177 | End: 2020-02-03
Attending: NURSE PRACTITIONER | Admitting: NURSE PRACTITIONER
Payer: MEDICARE

## 2020-01-27 VITALS — HEIGHT: 65 IN | BODY MASS INDEX: 30.99 KG/M2 | WEIGHT: 186 LBS

## 2020-01-27 VITALS — SYSTOLIC BLOOD PRESSURE: 146 MMHG | DIASTOLIC BLOOD PRESSURE: 81 MMHG

## 2020-01-27 DIAGNOSIS — I11.0: ICD-10-CM

## 2020-01-27 DIAGNOSIS — E78.5: ICD-10-CM

## 2020-01-27 DIAGNOSIS — F41.9: ICD-10-CM

## 2020-01-27 DIAGNOSIS — G82.20: ICD-10-CM

## 2020-01-27 DIAGNOSIS — F32.9: ICD-10-CM

## 2020-01-27 DIAGNOSIS — Z95.1: ICD-10-CM

## 2020-01-27 DIAGNOSIS — Z96.643: ICD-10-CM

## 2020-01-27 DIAGNOSIS — J15.6: Primary | ICD-10-CM

## 2020-01-27 DIAGNOSIS — K21.9: ICD-10-CM

## 2020-01-27 DIAGNOSIS — J96.01: ICD-10-CM

## 2020-01-27 DIAGNOSIS — N40.0: ICD-10-CM

## 2020-01-27 DIAGNOSIS — Z22.322: ICD-10-CM

## 2020-01-27 DIAGNOSIS — I25.10: ICD-10-CM

## 2020-01-27 DIAGNOSIS — D63.8: ICD-10-CM

## 2020-01-27 DIAGNOSIS — I50.33: ICD-10-CM

## 2020-01-27 DIAGNOSIS — Z66: ICD-10-CM

## 2020-01-27 LAB
ALBUMIN SERPL BCP-MCNC: 3 G/DL (ref 3.4–5)
ALP SERPL-CCNC: 106 U/L (ref 46–116)
ALT SERPL W P-5'-P-CCNC: 17 U/L (ref 12–78)
AST SERPL W P-5'-P-CCNC: 19 U/L (ref 15–37)
BASOPHILS # BLD AUTO: 0 /CMM (ref 0–0.2)
BASOPHILS NFR BLD AUTO: 0.5 % (ref 0–2)
BILIRUB DIRECT SERPL-MCNC: 0.1 MG/DL (ref 0–0.2)
BILIRUB SERPL-MCNC: 0.2 MG/DL (ref 0.2–1)
BUN SERPL-MCNC: 19 MG/DL (ref 7–18)
CALCIUM SERPL-MCNC: 9.4 MG/DL (ref 8.5–10.1)
CHLORIDE SERPL-SCNC: 96 MMOL/L (ref 98–107)
CO2 SERPL-SCNC: 28 MMOL/L (ref 21–32)
CREAT SERPL-MCNC: 1.2 MG/DL (ref 0.6–1.3)
EOSINOPHIL NFR BLD AUTO: 0.1 % (ref 0–6)
GLUCOSE SERPL-MCNC: 88 MG/DL (ref 74–106)
HCT VFR BLD AUTO: 28 % (ref 39–51)
HGB BLD-MCNC: 9.3 G/DL (ref 13.5–17.5)
LYMPHOCYTES NFR BLD AUTO: 0.8 /CMM (ref 0.8–4.8)
LYMPHOCYTES NFR BLD AUTO: 11.3 % (ref 20–44)
LYMPHOCYTES NFR BLD MANUAL: 6 % (ref 16–48)
MCHC RBC AUTO-ENTMCNC: 33 G/DL (ref 31–36)
MCV RBC AUTO: 84 FL (ref 80–96)
MONOCYTES NFR BLD AUTO: 1.6 /CMM (ref 0.1–1.3)
MONOCYTES NFR BLD AUTO: 22.7 % (ref 2–12)
MONOCYTES NFR BLD MANUAL: 10 % (ref 0–11)
NEUTROPHILS # BLD AUTO: 4.6 /CMM (ref 1.8–8.9)
NEUTROPHILS NFR BLD AUTO: 65.4 % (ref 43–81)
NEUTS SEG NFR BLD MANUAL: 84 % (ref 42–76)
PH UR STRIP: 5.5 [PH] (ref 5–8)
PLATELET # BLD AUTO: 301 /CMM (ref 150–450)
POTASSIUM SERPL-SCNC: 4 MMOL/L (ref 3.5–5.1)
PROT SERPL-MCNC: 8.9 G/DL (ref 6.4–8.2)
RBC # BLD AUTO: 3.33 MIL/UL (ref 4.5–6)
RBC #/AREA URNS HPF: (no result) /HPF (ref 0–2)
SODIUM SERPL-SCNC: 133 MMOL/L (ref 136–145)
UROBILINOGEN UR STRIP-MCNC: 0.2 EU/DL
WBC #/AREA URNS HPF: (no result) /HPF (ref 0–3)
WBC NRBC COR # BLD AUTO: 7.1 K/UL (ref 4.3–11)

## 2020-01-27 PROCEDURE — G0378 HOSPITAL OBSERVATION PER HR: HCPCS

## 2020-01-27 RX ADMIN — Medication SCH MG: at 23:30

## 2020-01-27 RX ADMIN — ENOXAPARIN SODIUM SCH MG: 40 INJECTION SUBCUTANEOUS at 23:06

## 2020-01-27 RX ADMIN — ALBUTEROL SULFATE SCH MG: 2.5 SOLUTION RESPIRATORY (INHALATION) at 23:30

## 2020-01-27 RX ADMIN — TRAZODONE HYDROCHLORIDE SCH MG: 50 TABLET ORAL at 23:06

## 2020-01-27 RX ADMIN — SODIUM CHLORIDE PRN MLS/HR: 9 INJECTION, SOLUTION INTRAVENOUS at 23:25

## 2020-01-27 RX ADMIN — ZOLPIDEM TARTRATE PRN MG: 5 TABLET, FILM COATED ORAL at 23:31

## 2020-01-27 NOTE — NUR
-------------------------------------------------------------------------------

           *** Note undone in [facility] - 01/27/20 at 1915 by GLADYS ***           

-------------------------------------------------------------------------------

REPORT LALY FROM [facility] FOR BLAINE.

## 2020-01-27 NOTE — NUR
Sent by PMD frm snf for pneumonia on CXR. Patient alert and oriented x4, 
braething even and unlabored, no sob noted, needs attended, kept comfortable. 
Attached to the cardiac monitor.

## 2020-01-27 NOTE — NUR
[facility] MS ADMISSION NOTES

RECEIVED PATIENT FROM ER VIA [facility]OV[facility]. DX. SEPSIS SECONDARY TO PNA. PATIENT IS ALERT AND 
ORIENTED X4, VERBALLY RESPONSIVE, ABLE TO MAKE NEEDS KNOWN. BREATHING EVEN AND UNLABORED. NO 
SOB - TOLERATING ROOM AIR. DENIES PAIN OR DISCOMFORT. DENIES N/V. IV INTACT AND PATENT WITH 
IVF INFUSING FROM ER. BELONGINGS ACCOUNTED FOR. ORIENTED TO THE UNIT AMENITIES. PATIENT IS 
WHEELCHAIR BOUND. REFUSES TO BE REPOSITIONED THROUGHOUT SHIFT, JUST WANTS TO SLEEP. ALL 
OTHER NEEDS ATTENDED TO, SAFETY MEASURES IN PLACE. CALL LIGHT WITHIN REACH. WILL CONTINUE TO 
MONITOR.

## 2020-01-28 VITALS — SYSTOLIC BLOOD PRESSURE: 125 MMHG | DIASTOLIC BLOOD PRESSURE: 65 MMHG

## 2020-01-28 VITALS — DIASTOLIC BLOOD PRESSURE: 59 MMHG | SYSTOLIC BLOOD PRESSURE: 119 MMHG

## 2020-01-28 VITALS — SYSTOLIC BLOOD PRESSURE: 154 MMHG | DIASTOLIC BLOOD PRESSURE: 76 MMHG

## 2020-01-28 LAB
ALBUMIN SERPL BCP-MCNC: 2.8 G/DL (ref 3.4–5)
ALP SERPL-CCNC: 107 U/L (ref 46–116)
ALT SERPL W P-5'-P-CCNC: 17 U/L (ref 12–78)
AST SERPL W P-5'-P-CCNC: 18 U/L (ref 15–37)
BASOPHILS # BLD AUTO: 0 /CMM (ref 0–0.2)
BASOPHILS NFR BLD AUTO: 0.4 % (ref 0–2)
BILIRUB SERPL-MCNC: 0.2 MG/DL (ref 0.2–1)
BUN SERPL-MCNC: 16 MG/DL (ref 7–18)
CALCIUM SERPL-MCNC: 9.1 MG/DL (ref 8.5–10.1)
CHLORIDE SERPL-SCNC: 102 MMOL/L (ref 98–107)
CHOLEST SERPL-MCNC: 124 MG/DL (ref ?–200)
CO2 SERPL-SCNC: 26 MMOL/L (ref 21–32)
CREAT SERPL-MCNC: 1 MG/DL (ref 0.6–1.3)
EOSINOPHIL NFR BLD AUTO: 0 % (ref 0–6)
GLUCOSE SERPL-MCNC: 150 MG/DL (ref 74–106)
HCT VFR BLD AUTO: 29 % (ref 39–51)
HDLC SERPL-MCNC: 52 MG/DL (ref 40–60)
HGB BLD-MCNC: 9.5 G/DL (ref 13.5–17.5)
IRON SERPL-MCNC: 30 UG/DL (ref 50–175)
LDLC SERPL DIRECT ASSAY-MCNC: 60 MG/DL (ref 0–99)
LYMPHOCYTES NFR BLD AUTO: 0.5 /CMM (ref 0.8–4.8)
LYMPHOCYTES NFR BLD AUTO: 9.8 % (ref 20–44)
MAGNESIUM SERPL-MCNC: 1.8 MG/DL (ref 1.8–2.4)
MCHC RBC AUTO-ENTMCNC: 33 G/DL (ref 31–36)
MCV RBC AUTO: 85 FL (ref 80–96)
MONOCYTES NFR BLD AUTO: 0.1 /CMM (ref 0.1–1.3)
MONOCYTES NFR BLD AUTO: 2.7 % (ref 2–12)
NEUTROPHILS # BLD AUTO: 4.5 /CMM (ref 1.8–8.9)
NEUTROPHILS NFR BLD AUTO: 87.1 % (ref 43–81)
PHOSPHATE SERPL-MCNC: 2.3 MG/DL (ref 2.5–4.9)
PLATELET # BLD AUTO: 275 /CMM (ref 150–450)
POTASSIUM SERPL-SCNC: 4.1 MMOL/L (ref 3.5–5.1)
PROT SERPL-MCNC: 8.3 G/DL (ref 6.4–8.2)
RBC # BLD AUTO: 3.37 MIL/UL (ref 4.5–6)
SODIUM SERPL-SCNC: 138 MMOL/L (ref 136–145)
TIBC SERPL-MCNC: 227 UG/DL (ref 250–450)
TRIGL SERPL-MCNC: 48 MG/DL (ref 30–150)
TSH SERPL DL<=0.005 MIU/L-ACNC: 0.83 UIU/ML (ref 0.36–3.74)
WBC NRBC COR # BLD AUTO: 5.2 K/UL (ref 4.3–11)

## 2020-01-28 RX ADMIN — Medication SCH MG: at 09:00

## 2020-01-28 RX ADMIN — TAMSULOSIN HYDROCHLORIDE SCH MG: 0.4 CAPSULE ORAL at 21:43

## 2020-01-28 RX ADMIN — Medication SCH MG: at 10:47

## 2020-01-28 RX ADMIN — ALBUTEROL SULFATE SCH MG: 2.5 SOLUTION RESPIRATORY (INHALATION) at 16:26

## 2020-01-28 RX ADMIN — DEXTROSE MONOHYDRATE SCH MLS/HR: 50 INJECTION, SOLUTION INTRAVENOUS at 09:11

## 2020-01-28 RX ADMIN — GUAIFENESIN SCH MG: 600 TABLET, EXTENDED RELEASE ORAL at 21:43

## 2020-01-28 RX ADMIN — ALBUTEROL SULFATE SCH MG: 2.5 SOLUTION RESPIRATORY (INHALATION) at 10:47

## 2020-01-28 RX ADMIN — MEROPENEM SCH MLS/HR: 1 INJECTION INTRAVENOUS at 12:31

## 2020-01-28 RX ADMIN — FLUOXETINE HYDROCHLORIDE SCH MG: 20 CAPSULE ORAL at 08:59

## 2020-01-28 RX ADMIN — Medication SCH MG: at 03:24

## 2020-01-28 RX ADMIN — Medication SCH MG: at 07:12

## 2020-01-28 RX ADMIN — FERROUS SULFATE TAB 325 MG (65 MG ELEMENTAL FE) SCH MG: 325 (65 FE) TAB at 16:59

## 2020-01-28 RX ADMIN — DEXTROSE MONOHYDRATE SCH MLS/HR: 50 INJECTION, SOLUTION INTRAVENOUS at 20:11

## 2020-01-28 RX ADMIN — FUROSEMIDE SCH MG: 40 TABLET ORAL at 09:00

## 2020-01-28 RX ADMIN — LACTOBACILLUS TAB SCH EACH: TAB at 09:01

## 2020-01-28 RX ADMIN — SODIUM CHLORIDE PRN MLS/HR: 9 INJECTION, SOLUTION INTRAVENOUS at 16:25

## 2020-01-28 RX ADMIN — ZOLPIDEM TARTRATE PRN MG: 5 TABLET, FILM COATED ORAL at 21:54

## 2020-01-28 RX ADMIN — ALBUTEROL SULFATE SCH MG: 2.5 SOLUTION RESPIRATORY (INHALATION) at 03:24

## 2020-01-28 RX ADMIN — MEROPENEM SCH MLS/HR: 1 INJECTION INTRAVENOUS at 21:29

## 2020-01-28 RX ADMIN — METOPROLOL TARTRATE SCH MG: 25 TABLET, FILM COATED ORAL at 09:01

## 2020-01-28 RX ADMIN — ENOXAPARIN SODIUM SCH MG: 40 INJECTION SUBCUTANEOUS at 21:52

## 2020-01-28 RX ADMIN — ALBUTEROL SULFATE SCH MG: 2.5 SOLUTION RESPIRATORY (INHALATION) at 07:12

## 2020-01-28 RX ADMIN — Medication SCH MG: at 16:26

## 2020-01-28 RX ADMIN — LACTOBACILLUS TAB SCH EACH: TAB at 12:31

## 2020-01-28 RX ADMIN — VITAMIN D, TAB 1000IU (100/BT) SCH UNIT: 25 TAB at 09:00

## 2020-01-28 RX ADMIN — Medication SCH MG: at 19:30

## 2020-01-28 RX ADMIN — METOPROLOL TARTRATE SCH MG: 25 TABLET, FILM COATED ORAL at 16:59

## 2020-01-28 RX ADMIN — Medication SCH MG: at 23:23

## 2020-01-28 RX ADMIN — GUAIFENESIN SCH MG: 600 TABLET, EXTENDED RELEASE ORAL at 09:00

## 2020-01-28 RX ADMIN — LACTOBACILLUS TAB SCH EACH: TAB at 16:58

## 2020-01-28 RX ADMIN — ALBUTEROL SULFATE SCH MG: 2.5 SOLUTION RESPIRATORY (INHALATION) at 19:30

## 2020-01-28 RX ADMIN — TRAZODONE HYDROCHLORIDE SCH MG: 50 TABLET ORAL at 21:43

## 2020-01-28 RX ADMIN — FERROUS SULFATE TAB 325 MG (65 MG ELEMENTAL FE) SCH MG: 325 (65 FE) TAB at 08:59

## 2020-01-28 RX ADMIN — ALBUTEROL SULFATE SCH MG: 2.5 SOLUTION RESPIRATORY (INHALATION) at 23:23

## 2020-01-28 NOTE — NUR
RN medsurg opening notes

Pt is resting in bed comfortably. Pt is alert and oriented x4. Respiration is normal in room 
air. No SOB. No S/S of distress noted. IV sites at R hand# 22 is clean, intact, patent and 
infusing well NS @ 75 ml/hr. Kept Pt clean, dry and comfortable. Safety precautions is 
maintained. Bed at low position, brakes locked, side rails up X3 and call light is within 
reach. Will continue to monitor.

## 2020-01-28 NOTE — NUR
RN MS NOTES

PATIENT REFUSED VITAL SIGNS THIS AM. ENCOURAGED MULTIPLE TIMES BUT STILL REFUSED. WOULD LIKE 
TO WAIT UNTIL LATER.

## 2020-01-28 NOTE — NUR
RN medsurmary lou notes

Per RT Pt refused breathing treatment. Made aware risks and benefits. Pt keep refusing. Will 
continue to monitor.

## 2020-01-28 NOTE — NUR
WOUND CARE CONSULT: PT PRESENTS WITH INCONTINENCE AND SCARRING INCLUDING TO SACRUM AND RT 
BUTTOCK, PRESENT ON ADMISSION. RECOMMENDATIONS MADE FOR SKIN PROTECTION. DISCUSSED WITH 
NURSING STAFF. FIRST STEP LOW AIRLOSS MATTRESS ON ORDER. WILL SEE MAYE CEBALLOS IN AGREEMENT WITH 
PLAN OF CARE. 

-------------------------------------------------------------------------------

Addendum: 01/28/20 at 0920 by MARI FLEMING WNDNU

-------------------------------------------------------------------------------

Amended: Links added.

## 2020-01-28 NOTE — NUR
RN MS CLOSING NOTES

PATIENT RESTING IN BED. NO ACUTE CHANGES THROUGHOUT SHIFT. BREATHING EVEN AND UNLABORED. 
TOLERATING ROOM AIR. DENIES PAIN OR DISCOMFORT. DENIES N/V. IV INTACT AND PATENT WITH IVF 
INFUSING. REFUSES TO BE REPOSITIONED THROUGHOUT SHIFT. NO FALLS/INJURY NOTED. ALL NEEDS 
ATTENDED TO, SAFETY MEASURES IN PLACE. CALL LIGHT WITHIN REACH. WILL ENDORSE TO ONCOMING 
NURSE FOR BLAINE.

## 2020-01-29 VITALS — SYSTOLIC BLOOD PRESSURE: 150 MMHG | DIASTOLIC BLOOD PRESSURE: 79 MMHG

## 2020-01-29 VITALS — DIASTOLIC BLOOD PRESSURE: 74 MMHG | SYSTOLIC BLOOD PRESSURE: 112 MMHG

## 2020-01-29 VITALS — SYSTOLIC BLOOD PRESSURE: 143 MMHG | DIASTOLIC BLOOD PRESSURE: 72 MMHG

## 2020-01-29 VITALS — SYSTOLIC BLOOD PRESSURE: 125 MMHG | DIASTOLIC BLOOD PRESSURE: 65 MMHG

## 2020-01-29 VITALS — SYSTOLIC BLOOD PRESSURE: 150 MMHG | DIASTOLIC BLOOD PRESSURE: 77 MMHG

## 2020-01-29 LAB
BUN SERPL-MCNC: 14 MG/DL (ref 7–18)
CALCIUM SERPL-MCNC: 8.8 MG/DL (ref 8.5–10.1)
CHLORIDE SERPL-SCNC: 102 MMOL/L (ref 98–107)
CO2 SERPL-SCNC: 25 MMOL/L (ref 21–32)
CREAT SERPL-MCNC: 1.2 MG/DL (ref 0.6–1.3)
GLUCOSE SERPL-MCNC: 95 MG/DL (ref 74–106)
PHOSPHATE SERPL-MCNC: 4.1 MG/DL (ref 2.5–4.9)
POTASSIUM SERPL-SCNC: 4.1 MMOL/L (ref 3.5–5.1)
SODIUM SERPL-SCNC: 138 MMOL/L (ref 136–145)

## 2020-01-29 RX ADMIN — FERROUS SULFATE TAB 325 MG (65 MG ELEMENTAL FE) SCH MG: 325 (65 FE) TAB at 09:21

## 2020-01-29 RX ADMIN — FLUOXETINE HYDROCHLORIDE SCH MG: 20 CAPSULE ORAL at 09:22

## 2020-01-29 RX ADMIN — LACTOBACILLUS TAB SCH EACH: TAB at 09:21

## 2020-01-29 RX ADMIN — CALCIUM CARBONATE (ANTACID) CHEW TAB 500 MG PRN MG: 500 CHEW TAB at 19:53

## 2020-01-29 RX ADMIN — Medication SCH MG: at 07:35

## 2020-01-29 RX ADMIN — METOPROLOL TARTRATE SCH MG: 25 TABLET, FILM COATED ORAL at 17:58

## 2020-01-29 RX ADMIN — MEROPENEM SCH MLS/HR: 1 INJECTION INTRAVENOUS at 04:04

## 2020-01-29 RX ADMIN — GUAIFENESIN SCH MG: 600 TABLET, EXTENDED RELEASE ORAL at 21:37

## 2020-01-29 RX ADMIN — FUROSEMIDE SCH MG: 40 TABLET ORAL at 09:21

## 2020-01-29 RX ADMIN — ALBUTEROL SULFATE SCH MG: 2.5 SOLUTION RESPIRATORY (INHALATION) at 11:30

## 2020-01-29 RX ADMIN — FERROUS SULFATE TAB 325 MG (65 MG ELEMENTAL FE) SCH MG: 325 (65 FE) TAB at 17:58

## 2020-01-29 RX ADMIN — Medication SCH MG: at 22:31

## 2020-01-29 RX ADMIN — TAMSULOSIN HYDROCHLORIDE SCH MG: 0.4 CAPSULE ORAL at 21:37

## 2020-01-29 RX ADMIN — MEROPENEM SCH MLS/HR: 1 INJECTION INTRAVENOUS at 21:32

## 2020-01-29 RX ADMIN — SODIUM CHLORIDE PRN MLS/HR: 9 INJECTION, SOLUTION INTRAVENOUS at 13:26

## 2020-01-29 RX ADMIN — ALBUTEROL SULFATE SCH MG: 2.5 SOLUTION RESPIRATORY (INHALATION) at 07:35

## 2020-01-29 RX ADMIN — ALBUTEROL SULFATE SCH MG: 2.5 SOLUTION RESPIRATORY (INHALATION) at 22:31

## 2020-01-29 RX ADMIN — Medication SCH MG: at 11:30

## 2020-01-29 RX ADMIN — ALBUTEROL SULFATE SCH MG: 2.5 SOLUTION RESPIRATORY (INHALATION) at 19:27

## 2020-01-29 RX ADMIN — Medication SCH MG: at 09:21

## 2020-01-29 RX ADMIN — VITAMIN D, TAB 1000IU (100/BT) SCH UNIT: 25 TAB at 09:21

## 2020-01-29 RX ADMIN — MUPIROCIN SCH GM: 20 OINTMENT TOPICAL at 21:37

## 2020-01-29 RX ADMIN — MUPIROCIN SCH GM: 20 OINTMENT TOPICAL at 17:58

## 2020-01-29 RX ADMIN — CALCIUM CARBONATE (ANTACID) CHEW TAB 500 MG PRN MG: 500 CHEW TAB at 10:48

## 2020-01-29 RX ADMIN — LACTOBACILLUS TAB SCH EACH: TAB at 17:58

## 2020-01-29 RX ADMIN — MEROPENEM SCH MLS/HR: 1 INJECTION INTRAVENOUS at 13:12

## 2020-01-29 RX ADMIN — ENOXAPARIN SODIUM SCH MG: 40 INJECTION SUBCUTANEOUS at 21:38

## 2020-01-29 RX ADMIN — ALBUTEROL SULFATE SCH MG: 2.5 SOLUTION RESPIRATORY (INHALATION) at 14:59

## 2020-01-29 RX ADMIN — LACTOBACILLUS TAB SCH EACH: TAB at 13:13

## 2020-01-29 RX ADMIN — Medication SCH MG: at 03:02

## 2020-01-29 RX ADMIN — GUAIFENESIN SCH MG: 600 TABLET, EXTENDED RELEASE ORAL at 09:21

## 2020-01-29 RX ADMIN — TRAZODONE HYDROCHLORIDE SCH MG: 50 TABLET ORAL at 21:37

## 2020-01-29 RX ADMIN — DEXTROSE MONOHYDRATE SCH MLS/HR: 50 INJECTION, SOLUTION INTRAVENOUS at 22:33

## 2020-01-29 RX ADMIN — Medication SCH MG: at 19:27

## 2020-01-29 RX ADMIN — ALBUTEROL SULFATE SCH MG: 2.5 SOLUTION RESPIRATORY (INHALATION) at 03:02

## 2020-01-29 RX ADMIN — DEXTROSE MONOHYDRATE SCH MLS/HR: 50 INJECTION, SOLUTION INTRAVENOUS at 09:21

## 2020-01-29 RX ADMIN — Medication SCH MG: at 14:59

## 2020-01-29 RX ADMIN — METOPROLOL TARTRATE SCH MG: 25 TABLET, FILM COATED ORAL at 09:22

## 2020-01-29 NOTE — NUR
RN Notes



Patient resting comfortably in bed, awake, alert and oriented x4 with daughter at bedside. 
Breathing regular and unlabored, on room air with good saturation. Patient denies any pain 
and discomfort at this time. IV access on right hand patent and intact with ongoing IVF 
infusing well. Plan of care discussed with the patient and verbalized understanding. Safety 
measures and fall precaution in place with call light within reach. Kept comfortable and 
attended. Will continue to monitor.

## 2020-01-29 NOTE — NUR
RN medsurg closing notes

Pt is resting in bed comfortably. Respiration is normal in room air. No SOB. No S/S of 
distress noted. IV sites at R hand# 22 is clean, intact, patent and infusing well NS @ 75 
ml/hr.  Routine meds were given as ordered. Kept Pt clean, dry and comfortable. All needs 
met and attended. Turned and repositioned Q 2 HR.  Safety precautions is maintained. Bed at 
low position, brakes locked, side rails up X3 and call light is within reach. Will endorse 
to morning nurse for BLAINE.

## 2020-01-30 VITALS — DIASTOLIC BLOOD PRESSURE: 72 MMHG | SYSTOLIC BLOOD PRESSURE: 139 MMHG

## 2020-01-30 VITALS — SYSTOLIC BLOOD PRESSURE: 110 MMHG | DIASTOLIC BLOOD PRESSURE: 71 MMHG

## 2020-01-30 LAB
BUN SERPL-MCNC: 12 MG/DL (ref 7–18)
CALCIUM SERPL-MCNC: 8.7 MG/DL (ref 8.5–10.1)
CHLORIDE SERPL-SCNC: 102 MMOL/L (ref 98–107)
CO2 SERPL-SCNC: 30 MMOL/L (ref 21–32)
CREAT SERPL-MCNC: 1.2 MG/DL (ref 0.6–1.3)
GLUCOSE SERPL-MCNC: 86 MG/DL (ref 74–106)
POTASSIUM SERPL-SCNC: 3.5 MMOL/L (ref 3.5–5.1)
SODIUM SERPL-SCNC: 139 MMOL/L (ref 136–145)

## 2020-01-30 RX ADMIN — MEROPENEM SCH MLS/HR: 1 INJECTION INTRAVENOUS at 13:28

## 2020-01-30 RX ADMIN — ALBUTEROL SULFATE SCH MG: 2.5 SOLUTION RESPIRATORY (INHALATION) at 23:30

## 2020-01-30 RX ADMIN — ZOLPIDEM TARTRATE PRN MG: 5 TABLET, FILM COATED ORAL at 21:05

## 2020-01-30 RX ADMIN — DEXTROSE MONOHYDRATE SCH MLS/HR: 50 INJECTION, SOLUTION INTRAVENOUS at 21:16

## 2020-01-30 RX ADMIN — DEXTROSE MONOHYDRATE PRN MG: 50 INJECTION, SOLUTION INTRAVENOUS at 16:37

## 2020-01-30 RX ADMIN — LACTOBACILLUS TAB SCH EACH: TAB at 16:36

## 2020-01-30 RX ADMIN — MEROPENEM SCH MLS/HR: 1 INJECTION INTRAVENOUS at 21:04

## 2020-01-30 RX ADMIN — Medication SCH MG: at 15:21

## 2020-01-30 RX ADMIN — VITAMIN D, TAB 1000IU (100/BT) SCH UNIT: 25 TAB at 10:14

## 2020-01-30 RX ADMIN — FUROSEMIDE SCH MG: 10 INJECTION, SOLUTION INTRAMUSCULAR; INTRAVENOUS at 16:37

## 2020-01-30 RX ADMIN — ENOXAPARIN SODIUM SCH MG: 40 INJECTION SUBCUTANEOUS at 22:38

## 2020-01-30 RX ADMIN — Medication SCH MG: at 10:14

## 2020-01-30 RX ADMIN — ALBUTEROL SULFATE SCH MG: 2.5 SOLUTION RESPIRATORY (INHALATION) at 11:37

## 2020-01-30 RX ADMIN — FERROUS SULFATE TAB 325 MG (65 MG ELEMENTAL FE) SCH MG: 325 (65 FE) TAB at 10:30

## 2020-01-30 RX ADMIN — METOPROLOL TARTRATE SCH MG: 25 TABLET, FILM COATED ORAL at 10:31

## 2020-01-30 RX ADMIN — TAMSULOSIN HYDROCHLORIDE SCH MG: 0.4 CAPSULE ORAL at 21:05

## 2020-01-30 RX ADMIN — Medication SCH MG: at 11:37

## 2020-01-30 RX ADMIN — Medication SCH MG: at 02:50

## 2020-01-30 RX ADMIN — GUAIFENESIN SCH MG: 600 TABLET, EXTENDED RELEASE ORAL at 21:05

## 2020-01-30 RX ADMIN — ALBUTEROL SULFATE SCH MG: 2.5 SOLUTION RESPIRATORY (INHALATION) at 07:38

## 2020-01-30 RX ADMIN — DEXTROSE MONOHYDRATE PRN MG: 50 INJECTION, SOLUTION INTRAVENOUS at 10:15

## 2020-01-30 RX ADMIN — FERROUS SULFATE TAB 325 MG (65 MG ELEMENTAL FE) SCH MG: 325 (65 FE) TAB at 20:09

## 2020-01-30 RX ADMIN — ALBUTEROL SULFATE SCH MG: 2.5 SOLUTION RESPIRATORY (INHALATION) at 15:22

## 2020-01-30 RX ADMIN — ALBUTEROL SULFATE SCH MG: 2.5 SOLUTION RESPIRATORY (INHALATION) at 19:30

## 2020-01-30 RX ADMIN — FUROSEMIDE SCH MG: 40 TABLET ORAL at 10:14

## 2020-01-30 RX ADMIN — CALCIUM CARBONATE (ANTACID) CHEW TAB 500 MG PRN MG: 500 CHEW TAB at 16:37

## 2020-01-30 RX ADMIN — Medication SCH MG: at 19:30

## 2020-01-30 RX ADMIN — FLUOXETINE HYDROCHLORIDE SCH MG: 20 CAPSULE ORAL at 10:14

## 2020-01-30 RX ADMIN — FUROSEMIDE SCH MG: 10 INJECTION, SOLUTION INTRAMUSCULAR; INTRAVENOUS at 13:35

## 2020-01-30 RX ADMIN — DEXTROSE MONOHYDRATE SCH MLS/HR: 50 INJECTION, SOLUTION INTRAVENOUS at 10:31

## 2020-01-30 RX ADMIN — Medication SCH MG: at 07:38

## 2020-01-30 RX ADMIN — SODIUM CHLORIDE PRN MLS/HR: 9 INJECTION, SOLUTION INTRAVENOUS at 13:22

## 2020-01-30 RX ADMIN — CALCIUM CARBONATE (ANTACID) CHEW TAB 500 MG PRN MG: 500 CHEW TAB at 10:14

## 2020-01-30 RX ADMIN — TRAZODONE HYDROCHLORIDE SCH MG: 50 TABLET ORAL at 21:05

## 2020-01-30 RX ADMIN — GUAIFENESIN SCH MG: 600 TABLET, EXTENDED RELEASE ORAL at 10:13

## 2020-01-30 RX ADMIN — MEROPENEM SCH MLS/HR: 1 INJECTION INTRAVENOUS at 04:22

## 2020-01-30 RX ADMIN — Medication SCH MG: at 23:30

## 2020-01-30 RX ADMIN — MUPIROCIN SCH GM: 20 OINTMENT TOPICAL at 21:16

## 2020-01-30 RX ADMIN — LACTOBACILLUS TAB SCH EACH: TAB at 13:28

## 2020-01-30 RX ADMIN — METOPROLOL TARTRATE SCH MG: 25 TABLET, FILM COATED ORAL at 16:37

## 2020-01-30 RX ADMIN — MUPIROCIN SCH GM: 20 OINTMENT TOPICAL at 10:46

## 2020-01-30 RX ADMIN — ALBUTEROL SULFATE SCH MG: 2.5 SOLUTION RESPIRATORY (INHALATION) at 02:50

## 2020-01-30 RX ADMIN — DEXTROSE MONOHYDRATE PRN MG: 50 INJECTION, SOLUTION INTRAVENOUS at 22:41

## 2020-01-30 RX ADMIN — LACTOBACILLUS TAB SCH EACH: TAB at 10:14

## 2020-01-30 NOTE — NUR
RN Notes



Patient stable overnight, afebrile. Cough at times, non productive. Patient is refusing his 
breathing treatment, per patient he feels ok and he doesn't need it. Denies sob, pain and 
any discomfort. Turn and repositioned per protocol. Kept clean and dry. All needs attended. 
Will endorse accordingly.

## 2020-01-30 NOTE — NUR
MS RN OPENING NOTES 



Received patient A/O x4, awake on bed with family at bedside. On RA, no SOB/respiratory 
distress noted. On isolation precautions for MRSA nares, observed by family and HCPs. No 
complaints made at this time. Kept on bed clean, dry and comfortable. Call light within easy 
reach. Will continue to monitor accordingly.

## 2020-01-30 NOTE — NUR
Called and left message to Dr. Brian re: pt need order for discontinuation of IV fluids 
since pt on IV lasix twice a day. Awaiting response.

## 2020-01-31 VITALS — DIASTOLIC BLOOD PRESSURE: 67 MMHG | SYSTOLIC BLOOD PRESSURE: 109 MMHG

## 2020-01-31 VITALS — DIASTOLIC BLOOD PRESSURE: 65 MMHG | SYSTOLIC BLOOD PRESSURE: 113 MMHG

## 2020-01-31 VITALS — SYSTOLIC BLOOD PRESSURE: 101 MMHG | DIASTOLIC BLOOD PRESSURE: 51 MMHG

## 2020-01-31 LAB
BUN SERPL-MCNC: 12 MG/DL (ref 7–18)
CALCIUM SERPL-MCNC: 9.3 MG/DL (ref 8.5–10.1)
CHLORIDE SERPL-SCNC: 98 MMOL/L (ref 98–107)
CO2 SERPL-SCNC: 30 MMOL/L (ref 21–32)
CREAT SERPL-MCNC: 1.4 MG/DL (ref 0.6–1.3)
GLUCOSE SERPL-MCNC: 112 MG/DL (ref 74–106)
POTASSIUM SERPL-SCNC: 3.6 MMOL/L (ref 3.5–5.1)
SODIUM SERPL-SCNC: 137 MMOL/L (ref 136–145)

## 2020-01-31 RX ADMIN — Medication SCH MG: at 10:57

## 2020-01-31 RX ADMIN — ALBUTEROL SULFATE SCH MG: 2.5 SOLUTION RESPIRATORY (INHALATION) at 23:21

## 2020-01-31 RX ADMIN — VITAMIN D, TAB 1000IU (100/BT) SCH UNIT: 25 TAB at 09:48

## 2020-01-31 RX ADMIN — TAMSULOSIN HYDROCHLORIDE SCH MG: 0.4 CAPSULE ORAL at 21:31

## 2020-01-31 RX ADMIN — ALBUTEROL SULFATE SCH MG: 2.5 SOLUTION RESPIRATORY (INHALATION) at 10:57

## 2020-01-31 RX ADMIN — GUAIFENESIN SCH MG: 600 TABLET, EXTENDED RELEASE ORAL at 09:50

## 2020-01-31 RX ADMIN — DEXTROSE MONOHYDRATE PRN MG: 50 INJECTION, SOLUTION INTRAVENOUS at 10:04

## 2020-01-31 RX ADMIN — LACTOBACILLUS TAB SCH EACH: TAB at 09:49

## 2020-01-31 RX ADMIN — DEXTROSE MONOHYDRATE PRN MG: 50 INJECTION, SOLUTION INTRAVENOUS at 15:54

## 2020-01-31 RX ADMIN — LACTOBACILLUS TAB SCH EACH: TAB at 17:47

## 2020-01-31 RX ADMIN — Medication SCH ML: at 18:19

## 2020-01-31 RX ADMIN — Medication SCH MG: at 19:30

## 2020-01-31 RX ADMIN — Medication SCH MG: at 23:20

## 2020-01-31 RX ADMIN — DEXTROSE MONOHYDRATE SCH MLS/HR: 50 INJECTION, SOLUTION INTRAVENOUS at 08:56

## 2020-01-31 RX ADMIN — ALBUTEROL SULFATE SCH MG: 2.5 SOLUTION RESPIRATORY (INHALATION) at 08:26

## 2020-01-31 RX ADMIN — CALCIUM CARBONATE (ANTACID) CHEW TAB 500 MG PRN MG: 500 CHEW TAB at 12:34

## 2020-01-31 RX ADMIN — GUAIFENESIN SCH MG: 600 TABLET, EXTENDED RELEASE ORAL at 20:32

## 2020-01-31 RX ADMIN — MUPIROCIN SCH GM: 20 OINTMENT TOPICAL at 20:39

## 2020-01-31 RX ADMIN — FERROUS SULFATE TAB 325 MG (65 MG ELEMENTAL FE) SCH MG: 325 (65 FE) TAB at 17:47

## 2020-01-31 RX ADMIN — MUPIROCIN SCH GM: 20 OINTMENT TOPICAL at 10:13

## 2020-01-31 RX ADMIN — Medication SCH MG: at 08:26

## 2020-01-31 RX ADMIN — MEROPENEM SCH MLS/HR: 1 INJECTION INTRAVENOUS at 13:23

## 2020-01-31 RX ADMIN — Medication SCH MG: at 10:00

## 2020-01-31 RX ADMIN — METOPROLOL TARTRATE SCH MG: 25 TABLET, FILM COATED ORAL at 09:00

## 2020-01-31 RX ADMIN — FUROSEMIDE SCH MG: 10 INJECTION, SOLUTION INTRAMUSCULAR; INTRAVENOUS at 09:55

## 2020-01-31 RX ADMIN — ALBUTEROL SULFATE SCH MG: 2.5 SOLUTION RESPIRATORY (INHALATION) at 19:30

## 2020-01-31 RX ADMIN — TRAZODONE HYDROCHLORIDE SCH MG: 50 TABLET ORAL at 21:31

## 2020-01-31 RX ADMIN — ZOLPIDEM TARTRATE PRN MG: 5 TABLET, FILM COATED ORAL at 21:38

## 2020-01-31 RX ADMIN — FLUOXETINE HYDROCHLORIDE SCH MG: 20 CAPSULE ORAL at 09:50

## 2020-01-31 RX ADMIN — Medication SCH MG: at 03:30

## 2020-01-31 RX ADMIN — MEROPENEM SCH MLS/HR: 1 INJECTION INTRAVENOUS at 20:32

## 2020-01-31 RX ADMIN — ENOXAPARIN SODIUM SCH MG: 40 INJECTION SUBCUTANEOUS at 21:32

## 2020-01-31 RX ADMIN — FUROSEMIDE SCH MG: 10 INJECTION, SOLUTION INTRAMUSCULAR; INTRAVENOUS at 18:18

## 2020-01-31 RX ADMIN — METOPROLOL TARTRATE SCH MG: 25 TABLET, FILM COATED ORAL at 17:00

## 2020-01-31 RX ADMIN — ALBUTEROL SULFATE SCH MG: 2.5 SOLUTION RESPIRATORY (INHALATION) at 14:45

## 2020-01-31 RX ADMIN — FERROUS SULFATE TAB 325 MG (65 MG ELEMENTAL FE) SCH MG: 325 (65 FE) TAB at 09:55

## 2020-01-31 RX ADMIN — DEXTROSE MONOHYDRATE PRN MG: 50 INJECTION, SOLUTION INTRAVENOUS at 22:18

## 2020-01-31 RX ADMIN — LACTOBACILLUS TAB SCH EACH: TAB at 13:23

## 2020-01-31 RX ADMIN — Medication SCH MG: at 14:45

## 2020-01-31 RX ADMIN — ALBUTEROL SULFATE SCH MG: 2.5 SOLUTION RESPIRATORY (INHALATION) at 03:30

## 2020-01-31 RX ADMIN — MEROPENEM SCH MLS/HR: 1 INJECTION INTRAVENOUS at 05:06

## 2020-01-31 NOTE — NUR
MS RN NOTES

PATIENT IN BED A/O X4 , HE COMPLAINED OF BURNING SENSATION AT CLARK CATHETER SITE. CLARK 
REMOVED (PER MD ORDER). ALL NEEDS ATTENDED, MEDICATIONS ADMINISTRATED. CALL LIGHT WITHIN 
REACH, BED AT THE LOWEST POSITION LOCKED. ENDORSED TO NIGHT SHIFT NURSE FOR BLAINE.

## 2020-01-31 NOTE — NUR
MS RN NOTES

PATIENT IN BED AWAKE, A/O X 4 ON MRSA NARES ISOLATION. NO SOB OR OR PAIN NOTED AT THIS TIME. 
PATIENT ON ROOM AIR SPO2 94%. CALL LIGHT WITHIN REACH, BED AT THE LOWEST POSITION LOCKED. 
WILL CONTINUE TO MONITOR.

## 2020-01-31 NOTE — NUR
MS RN NOTE 



ADMINISTERED PRN ZOFRAN 4MG PER REQUEST OF PATIENT FEELING NAUSEOUS. WILL CONTINUE TO 
MONITOR.

## 2020-01-31 NOTE — NUR
MS RN OPENING NOTE 



RECEIVED PATIENT ON CONTACT ISOLATION FOR MRSA. PATIENT IN BED. A/OX4. TOLERATING ROOM AIR. 
RESPIRATIONS ARE EVEN AND UNLABORED. NO S/S SOB NOTED. DENIES PAIN AT THIS TIME. IN NO 
APPARENT DISTRESS. IV ACCESS IN LFA PATENT AND SALINE LOCKED. BED IS LOW AND LOCKED, HOB IN 
SEMI FOWLERS, SIDE RAILS UP X2, CALL LIGHT WITHIN REACH WILL CONTINUE TO MONITOR.

## 2020-01-31 NOTE — NUR
MS RN CLOSING NOTES



Patient asleep,  easily awaken. On RA, no respiratory distress noted. No complaints made at 
this time. All nursing needs attended. Due meds given as ordered. Kept on bed clean, dry and 
comfortable. Call light within easy reach. On fall and aspiration precautions. Endorsed.

## 2020-02-01 VITALS — DIASTOLIC BLOOD PRESSURE: 59 MMHG | SYSTOLIC BLOOD PRESSURE: 110 MMHG

## 2020-02-01 VITALS — SYSTOLIC BLOOD PRESSURE: 128 MMHG | DIASTOLIC BLOOD PRESSURE: 67 MMHG

## 2020-02-01 VITALS — SYSTOLIC BLOOD PRESSURE: 103 MMHG | DIASTOLIC BLOOD PRESSURE: 68 MMHG

## 2020-02-01 LAB
BUN SERPL-MCNC: 14 MG/DL (ref 7–18)
CALCIUM SERPL-MCNC: 8.9 MG/DL (ref 8.5–10.1)
CHLORIDE SERPL-SCNC: 99 MMOL/L (ref 98–107)
CO2 SERPL-SCNC: 33 MMOL/L (ref 21–32)
CREAT SERPL-MCNC: 1.2 MG/DL (ref 0.6–1.3)
GLUCOSE SERPL-MCNC: 89 MG/DL (ref 74–106)
POTASSIUM SERPL-SCNC: 3.3 MMOL/L (ref 3.5–5.1)
SODIUM SERPL-SCNC: 138 MMOL/L (ref 136–145)

## 2020-02-01 PROCEDURE — 0W9B3ZZ DRAINAGE OF LEFT PLEURAL CAVITY, PERCUTANEOUS APPROACH: ICD-10-PCS

## 2020-02-01 RX ADMIN — FUROSEMIDE SCH MG: 10 INJECTION, SOLUTION INTRAMUSCULAR; INTRAVENOUS at 17:17

## 2020-02-01 RX ADMIN — ALBUTEROL SULFATE SCH MG: 2.5 SOLUTION RESPIRATORY (INHALATION) at 03:30

## 2020-02-01 RX ADMIN — GUAIFENESIN SCH MG: 600 TABLET, EXTENDED RELEASE ORAL at 20:16

## 2020-02-01 RX ADMIN — ALBUTEROL SULFATE SCH MG: 2.5 SOLUTION RESPIRATORY (INHALATION) at 08:01

## 2020-02-01 RX ADMIN — Medication SCH MG: at 03:30

## 2020-02-01 RX ADMIN — MEROPENEM SCH MLS/HR: 1 INJECTION INTRAVENOUS at 05:08

## 2020-02-01 RX ADMIN — MUPIROCIN SCH GM: 20 OINTMENT TOPICAL at 09:28

## 2020-02-01 RX ADMIN — Medication SCH MG: at 15:30

## 2020-02-01 RX ADMIN — MEROPENEM SCH MLS/HR: 1 INJECTION INTRAVENOUS at 20:15

## 2020-02-01 RX ADMIN — ALBUTEROL SULFATE SCH MG: 2.5 SOLUTION RESPIRATORY (INHALATION) at 23:29

## 2020-02-01 RX ADMIN — METOPROLOL TARTRATE SCH MG: 25 TABLET, FILM COATED ORAL at 09:20

## 2020-02-01 RX ADMIN — Medication SCH MG: at 11:08

## 2020-02-01 RX ADMIN — FERROUS SULFATE TAB 325 MG (65 MG ELEMENTAL FE) SCH MG: 325 (65 FE) TAB at 17:17

## 2020-02-01 RX ADMIN — LACTOBACILLUS TAB SCH EACH: TAB at 13:45

## 2020-02-01 RX ADMIN — FUROSEMIDE SCH MG: 10 INJECTION, SOLUTION INTRAMUSCULAR; INTRAVENOUS at 09:20

## 2020-02-01 RX ADMIN — METOPROLOL TARTRATE SCH MG: 25 TABLET, FILM COATED ORAL at 17:00

## 2020-02-01 RX ADMIN — TRAZODONE HYDROCHLORIDE SCH MG: 50 TABLET ORAL at 21:31

## 2020-02-01 RX ADMIN — DEXTROSE MONOHYDRATE PRN MG: 50 INJECTION, SOLUTION INTRAVENOUS at 09:49

## 2020-02-01 RX ADMIN — LACTOBACILLUS TAB SCH EACH: TAB at 09:20

## 2020-02-01 RX ADMIN — GUAIFENESIN SCH MG: 600 TABLET, EXTENDED RELEASE ORAL at 09:19

## 2020-02-01 RX ADMIN — ALBUTEROL SULFATE SCH MG: 2.5 SOLUTION RESPIRATORY (INHALATION) at 11:08

## 2020-02-01 RX ADMIN — FLUOXETINE HYDROCHLORIDE SCH MG: 20 CAPSULE ORAL at 09:20

## 2020-02-01 RX ADMIN — ALBUTEROL SULFATE SCH MG: 2.5 SOLUTION RESPIRATORY (INHALATION) at 15:30

## 2020-02-01 RX ADMIN — ALBUTEROL SULFATE SCH MG: 2.5 SOLUTION RESPIRATORY (INHALATION) at 19:30

## 2020-02-01 RX ADMIN — Medication SCH ML: at 17:00

## 2020-02-01 RX ADMIN — TAMSULOSIN HYDROCHLORIDE SCH MG: 0.4 CAPSULE ORAL at 21:31

## 2020-02-01 RX ADMIN — Medication SCH MG: at 08:01

## 2020-02-01 RX ADMIN — Medication SCH MG: at 23:29

## 2020-02-01 RX ADMIN — MUPIROCIN SCH GM: 20 OINTMENT TOPICAL at 20:27

## 2020-02-01 RX ADMIN — DEXTROSE MONOHYDRATE PRN MG: 50 INJECTION, SOLUTION INTRAVENOUS at 20:15

## 2020-02-01 RX ADMIN — VITAMIN D, TAB 1000IU (100/BT) SCH UNIT: 25 TAB at 09:20

## 2020-02-01 RX ADMIN — MEROPENEM SCH MLS/HR: 1 INJECTION INTRAVENOUS at 13:45

## 2020-02-01 RX ADMIN — LACTOBACILLUS TAB SCH EACH: TAB at 17:16

## 2020-02-01 RX ADMIN — Medication SCH MG: at 19:30

## 2020-02-01 RX ADMIN — Medication SCH ML: at 09:00

## 2020-02-01 RX ADMIN — ENOXAPARIN SODIUM SCH MG: 40 INJECTION SUBCUTANEOUS at 21:33

## 2020-02-01 RX ADMIN — ZOLPIDEM TARTRATE PRN MG: 5 TABLET, FILM COATED ORAL at 21:31

## 2020-02-01 RX ADMIN — Medication SCH MG: at 09:21

## 2020-02-01 RX ADMIN — FERROUS SULFATE TAB 325 MG (65 MG ELEMENTAL FE) SCH MG: 325 (65 FE) TAB at 08:38

## 2020-02-01 NOTE — NUR
RN NOTES PM SHIFT

PATIENT ALERT AND ORIENTED X4, ROOM AIR, NO COMPLAIN OF PAIN AT THIS TIME, COMPLAINING OF 
NAUSEA, REQUESTING ZOFRAN, ON CONTACT ISOLATION FOR MRSA, KEPT SAFE, CALL LIGHT WITHIN REACH

## 2020-02-01 NOTE — NUR
MS RN CLOSING NOTE 



PATIENT REMAINS ON CONTACT ISOLATION FOR MRSA. PATIENT IN BED. A/OX4. TOLERATING ROOM AIR. 
RESPIRATIONS ARE EVEN AND UNLABORED. NO SOB NOTED. NO C/O PAIN THROUGHOUT NIGHT. NO DISTRESS 
NOTED. IV ACCESS MAINTAINED IN LFA PATENT AND SALINE LOCKED. BED IS LOW AND LOCKED, HOB IN 
SEMI FOWLERS, SIDE RAILS UP X2, CALL LIGHT WITHIN REACH WILL ENDORSE TO NEXT SHIFT.

## 2020-02-01 NOTE — NUR
RN NOTES



DR ADAN DID THE THORACENTESIS BUT ONLY SMALL AMOUNT WAS TAKEN OUT. ORDERED STAT CXR FOR 
POST PROCEDURE.

## 2020-02-01 NOTE — NUR
RN OPENING NOTE 



RECEIVED PATIENT ON CONTACT ISOLATION FOR MRSA. PATIENT IN BED. A/OX4. TOLERATING ROOM AIR. 
RESPIRATIONS ARE EVEN AND UNLABORED. NO S/S SOB NOTED. DENIES PAIN AT THIS TIME. IN NO 
APPARENT DISTRESS. IV ACCESS IN L FOREARM, INTACT, PATENT AND FLUSHED WELL. NO SIGNS OF 
INFILTRATION NOTED . BED IN LOWEST POSITION AND LOCKED.  HOB IN SEMI FOWLERS, SIDE RAILS UP 
X2, CALL LIGHT WITHIN REACH WILL CONTINUE TO MONITOR.

## 2020-02-01 NOTE — NUR
RT NOTE.



 PT REFUSED BREATHING TX. NO SOB NOTED. SPO2 93% ON RA AND 91 HEART RATE WILL CONTINUE TO 
MONITOR.

## 2020-02-01 NOTE — NUR
RN CLOSING NOTES





PATIENT IN BED,  VERBALLY RESPONSIVE AND ABLE TO MAKE NEEDS KNOWN. DENIES ANY  PAIN OR 
DISCOMFORT AT THE MOMENT. KEPT CLEAN AND DRY. MAINTAINED ISOLATION THE WHOLE SHIFT. ALL MEDS 
GIVEN AND TOLERATED WELL, ALL NEEDS MET. WILL ENDORSED TO PM SHIFT FOR CONTINUITY OF CARE

## 2020-02-02 VITALS — DIASTOLIC BLOOD PRESSURE: 46 MMHG | SYSTOLIC BLOOD PRESSURE: 109 MMHG

## 2020-02-02 VITALS — DIASTOLIC BLOOD PRESSURE: 68 MMHG | SYSTOLIC BLOOD PRESSURE: 125 MMHG

## 2020-02-02 VITALS — SYSTOLIC BLOOD PRESSURE: 119 MMHG | DIASTOLIC BLOOD PRESSURE: 67 MMHG

## 2020-02-02 VITALS — SYSTOLIC BLOOD PRESSURE: 93 MMHG | DIASTOLIC BLOOD PRESSURE: 56 MMHG

## 2020-02-02 VITALS — SYSTOLIC BLOOD PRESSURE: 125 MMHG | DIASTOLIC BLOOD PRESSURE: 68 MMHG

## 2020-02-02 LAB
BUN SERPL-MCNC: 17 MG/DL (ref 7–18)
CALCIUM SERPL-MCNC: 8.7 MG/DL (ref 8.5–10.1)
CHLORIDE SERPL-SCNC: 99 MMOL/L (ref 98–107)
CO2 SERPL-SCNC: 34 MMOL/L (ref 21–32)
CREAT SERPL-MCNC: 1.2 MG/DL (ref 0.6–1.3)
GLUCOSE SERPL-MCNC: 90 MG/DL (ref 74–106)
POTASSIUM SERPL-SCNC: 3.6 MMOL/L (ref 3.5–5.1)
SODIUM SERPL-SCNC: 140 MMOL/L (ref 136–145)

## 2020-02-02 RX ADMIN — GUAIFENESIN SCH MG: 600 TABLET, EXTENDED RELEASE ORAL at 21:30

## 2020-02-02 RX ADMIN — MUPIROCIN SCH GM: 20 OINTMENT TOPICAL at 10:05

## 2020-02-02 RX ADMIN — GUAIFENESIN SCH MG: 600 TABLET, EXTENDED RELEASE ORAL at 10:03

## 2020-02-02 RX ADMIN — FLUOXETINE HYDROCHLORIDE SCH MG: 20 CAPSULE ORAL at 10:03

## 2020-02-02 RX ADMIN — ALBUTEROL SULFATE SCH MG: 2.5 SOLUTION RESPIRATORY (INHALATION) at 19:30

## 2020-02-02 RX ADMIN — VITAMIN D, TAB 1000IU (100/BT) SCH UNIT: 25 TAB at 10:02

## 2020-02-02 RX ADMIN — METOPROLOL TARTRATE SCH MG: 25 TABLET, FILM COATED ORAL at 18:56

## 2020-02-02 RX ADMIN — TRAZODONE HYDROCHLORIDE SCH MG: 50 TABLET ORAL at 21:30

## 2020-02-02 RX ADMIN — ALBUTEROL SULFATE SCH MG: 2.5 SOLUTION RESPIRATORY (INHALATION) at 11:30

## 2020-02-02 RX ADMIN — LACTOBACILLUS TAB SCH EACH: TAB at 10:03

## 2020-02-02 RX ADMIN — Medication SCH ML: at 10:04

## 2020-02-02 RX ADMIN — MEROPENEM SCH MLS/HR: 1 INJECTION INTRAVENOUS at 04:04

## 2020-02-02 RX ADMIN — MEROPENEM SCH MLS/HR: 1 INJECTION INTRAVENOUS at 21:25

## 2020-02-02 RX ADMIN — FUROSEMIDE SCH MG: 10 INJECTION, SOLUTION INTRAMUSCULAR; INTRAVENOUS at 18:56

## 2020-02-02 RX ADMIN — METOPROLOL TARTRATE SCH MG: 25 TABLET, FILM COATED ORAL at 10:03

## 2020-02-02 RX ADMIN — Medication SCH MG: at 19:30

## 2020-02-02 RX ADMIN — LACTOBACILLUS TAB SCH EACH: TAB at 13:29

## 2020-02-02 RX ADMIN — ALBUTEROL SULFATE SCH MG: 2.5 SOLUTION RESPIRATORY (INHALATION) at 03:29

## 2020-02-02 RX ADMIN — Medication SCH MG: at 09:04

## 2020-02-02 RX ADMIN — CALCIUM CARBONATE (ANTACID) CHEW TAB 500 MG PRN MG: 500 CHEW TAB at 16:10

## 2020-02-02 RX ADMIN — FERROUS SULFATE TAB 325 MG (65 MG ELEMENTAL FE) SCH MG: 325 (65 FE) TAB at 11:02

## 2020-02-02 RX ADMIN — FUROSEMIDE SCH MG: 10 INJECTION, SOLUTION INTRAMUSCULAR; INTRAVENOUS at 10:04

## 2020-02-02 RX ADMIN — FERROUS SULFATE TAB 325 MG (65 MG ELEMENTAL FE) SCH MG: 325 (65 FE) TAB at 18:56

## 2020-02-02 RX ADMIN — ALBUTEROL SULFATE SCH MG: 2.5 SOLUTION RESPIRATORY (INHALATION) at 09:04

## 2020-02-02 RX ADMIN — Medication SCH MG: at 10:04

## 2020-02-02 RX ADMIN — ALBUTEROL SULFATE SCH MG: 2.5 SOLUTION RESPIRATORY (INHALATION) at 23:00

## 2020-02-02 RX ADMIN — ZOLPIDEM TARTRATE PRN MG: 5 TABLET, FILM COATED ORAL at 21:46

## 2020-02-02 RX ADMIN — ALBUTEROL SULFATE SCH MG: 2.5 SOLUTION RESPIRATORY (INHALATION) at 15:30

## 2020-02-02 RX ADMIN — Medication SCH MG: at 23:00

## 2020-02-02 RX ADMIN — Medication SCH MG: at 11:30

## 2020-02-02 RX ADMIN — DEXTROSE MONOHYDRATE PRN MG: 50 INJECTION, SOLUTION INTRAVENOUS at 13:31

## 2020-02-02 RX ADMIN — Medication SCH ML: at 18:56

## 2020-02-02 RX ADMIN — ENOXAPARIN SODIUM SCH MG: 40 INJECTION SUBCUTANEOUS at 21:31

## 2020-02-02 RX ADMIN — MEROPENEM SCH MLS/HR: 1 INJECTION INTRAVENOUS at 13:29

## 2020-02-02 RX ADMIN — MUPIROCIN SCH GM: 20 OINTMENT TOPICAL at 21:46

## 2020-02-02 RX ADMIN — TAMSULOSIN HYDROCHLORIDE SCH MG: 0.4 CAPSULE ORAL at 21:30

## 2020-02-02 RX ADMIN — Medication SCH MG: at 15:30

## 2020-02-02 RX ADMIN — LACTOBACILLUS TAB SCH EACH: TAB at 18:56

## 2020-02-02 RX ADMIN — Medication SCH MG: at 03:29

## 2020-02-02 NOTE — NUR
MS RN NOTES



PATIENT IN BED, AWAKE, ALERT AND ORIENTED X 4. BREATHING EVEN AND UNLABORED ON ROOM AIR. 
SHOWS NO SIGNS OF ACUTE RESPIRATORY DISTRESS, NO ACUTE PAIN. IV ON LFA #22G SL. SHOWS NO 
SIGNS OF INFILTRATION, NO REDNESS. CLEAN DRY AND INTACT. SAFETY PRECAUTIONS IN PLACE. BED IN 
LOWEST POSITION, LOCKED, AND CALL LIGHT KEPT WITHIN REACH. WILL CONTINUE TO MONITOR.

## 2020-02-02 NOTE — NUR
PATIENT ALERT AND ORIENTED X4, ON ROOM AIR, NO COMPLAIN OF PAIN, ON BEDREST, GENERALIZED 
WEAKNESS, S/P THORACENTESIS 2/1/20 WITH NO OUTPUT, NO PNEUMOTHORAX PER CXR, CONTINUE 
HOSPITALIZATION, CONTINUE DIURESIS, ON MERREM Q24 HOURS

## 2020-02-03 VITALS — SYSTOLIC BLOOD PRESSURE: 107 MMHG | DIASTOLIC BLOOD PRESSURE: 50 MMHG

## 2020-02-03 VITALS — SYSTOLIC BLOOD PRESSURE: 98 MMHG | DIASTOLIC BLOOD PRESSURE: 51 MMHG

## 2020-02-03 LAB
BASOPHILS # BLD AUTO: 0 /CMM (ref 0–0.2)
BASOPHILS NFR BLD AUTO: 0.4 % (ref 0–2)
BUN SERPL-MCNC: 25 MG/DL (ref 7–18)
CALCIUM SERPL-MCNC: 8.8 MG/DL (ref 8.5–10.1)
CHLORIDE SERPL-SCNC: 99 MMOL/L (ref 98–107)
CO2 SERPL-SCNC: 34 MMOL/L (ref 21–32)
CREAT SERPL-MCNC: 1.2 MG/DL (ref 0.6–1.3)
EOSINOPHIL NFR BLD AUTO: 0.1 % (ref 0–6)
GLUCOSE SERPL-MCNC: 91 MG/DL (ref 74–106)
HCT VFR BLD AUTO: 30 % (ref 39–51)
HGB BLD-MCNC: 9.5 G/DL (ref 13.5–17.5)
LYMPHOCYTES NFR BLD AUTO: 0.9 /CMM (ref 0.8–4.8)
LYMPHOCYTES NFR BLD AUTO: 15.7 % (ref 20–44)
LYMPHOCYTES NFR BLD MANUAL: 8 % (ref 16–48)
MCHC RBC AUTO-ENTMCNC: 32 G/DL (ref 31–36)
MCV RBC AUTO: 84 FL (ref 80–96)
MONOCYTES NFR BLD AUTO: 1.5 /CMM (ref 0.1–1.3)
MONOCYTES NFR BLD AUTO: 27.3 % (ref 2–12)
MONOCYTES NFR BLD MANUAL: 32 % (ref 0–11)
NEUTROPHILS # BLD AUTO: 3.2 /CMM (ref 1.8–8.9)
NEUTROPHILS NFR BLD AUTO: 56.5 % (ref 43–81)
NEUTS SEG NFR BLD MANUAL: 60 % (ref 42–76)
PLATELET # BLD AUTO: 281 /CMM (ref 150–450)
POTASSIUM SERPL-SCNC: 3.4 MMOL/L (ref 3.5–5.1)
RBC # BLD AUTO: 3.51 MIL/UL (ref 4.5–6)
SODIUM SERPL-SCNC: 141 MMOL/L (ref 136–145)
WBC NRBC COR # BLD AUTO: 5.7 K/UL (ref 4.3–11)

## 2020-02-03 RX ADMIN — Medication SCH MG: at 07:35

## 2020-02-03 RX ADMIN — MUPIROCIN SCH GM: 20 OINTMENT TOPICAL at 09:24

## 2020-02-03 RX ADMIN — ALBUTEROL SULFATE SCH MG: 2.5 SOLUTION RESPIRATORY (INHALATION) at 07:35

## 2020-02-03 RX ADMIN — MEROPENEM SCH MLS/HR: 1 INJECTION INTRAVENOUS at 13:04

## 2020-02-03 RX ADMIN — FUROSEMIDE SCH MG: 10 INJECTION, SOLUTION INTRAMUSCULAR; INTRAVENOUS at 16:45

## 2020-02-03 RX ADMIN — LACTOBACILLUS TAB SCH EACH: TAB at 13:05

## 2020-02-03 RX ADMIN — LACTOBACILLUS TAB SCH EACH: TAB at 16:45

## 2020-02-03 RX ADMIN — GUAIFENESIN SCH MG: 600 TABLET, EXTENDED RELEASE ORAL at 09:21

## 2020-02-03 RX ADMIN — FLUOXETINE HYDROCHLORIDE SCH MG: 20 CAPSULE ORAL at 09:22

## 2020-02-03 RX ADMIN — Medication SCH MG: at 15:30

## 2020-02-03 RX ADMIN — FERROUS SULFATE TAB 325 MG (65 MG ELEMENTAL FE) SCH MG: 325 (65 FE) TAB at 17:07

## 2020-02-03 RX ADMIN — Medication SCH ML: at 09:23

## 2020-02-03 RX ADMIN — LACTOBACILLUS TAB SCH EACH: TAB at 09:00

## 2020-02-03 RX ADMIN — ALBUTEROL SULFATE SCH MG: 2.5 SOLUTION RESPIRATORY (INHALATION) at 03:10

## 2020-02-03 RX ADMIN — Medication SCH ML: at 16:34

## 2020-02-03 RX ADMIN — METOPROLOL TARTRATE SCH MG: 25 TABLET, FILM COATED ORAL at 16:36

## 2020-02-03 RX ADMIN — VITAMIN D, TAB 1000IU (100/BT) SCH UNIT: 25 TAB at 09:22

## 2020-02-03 RX ADMIN — FERROUS SULFATE TAB 325 MG (65 MG ELEMENTAL FE) SCH MG: 325 (65 FE) TAB at 09:22

## 2020-02-03 RX ADMIN — Medication SCH MG: at 11:30

## 2020-02-03 RX ADMIN — ALBUTEROL SULFATE SCH MG: 2.5 SOLUTION RESPIRATORY (INHALATION) at 15:30

## 2020-02-03 RX ADMIN — MEROPENEM SCH MLS/HR: 1 INJECTION INTRAVENOUS at 05:14

## 2020-02-03 RX ADMIN — FUROSEMIDE SCH MG: 10 INJECTION, SOLUTION INTRAMUSCULAR; INTRAVENOUS at 09:23

## 2020-02-03 RX ADMIN — METOPROLOL TARTRATE SCH MG: 25 TABLET, FILM COATED ORAL at 09:00

## 2020-02-03 RX ADMIN — ALBUTEROL SULFATE SCH MG: 2.5 SOLUTION RESPIRATORY (INHALATION) at 11:30

## 2020-02-03 RX ADMIN — Medication SCH MG: at 09:22

## 2020-02-03 RX ADMIN — Medication SCH MG: at 03:09

## 2020-02-03 NOTE — NUR
MS RN NOTES



PATIENT IN BED, ASLEEP, ALERT AND ORIENTED X 4. BREATHING EVEN AND UNLABORED ON ROOM AIR. 
SHOWS NO SIGNS OF ACUTE RESPIRATORY DISTRESS, NO ACUTE PAIN. IV ON LFA #22G SL. SHOWS NO 
SIGNS OF INFILTRATION, NO REDNESS. CLEAN DRY AND INTACT. ALL DUE MEDICATIONS GIVEN. SAFETY 
PRECAUTIONS IN PLACE. BED IN LOWEST POSITION, LOCKED, AND CALL LIGHT KEPT WITHIN REACH. WILL 
ENDORSE TO ONCOMING NURSE.

## 2020-02-21 ENCOUNTER — OFFICE (OUTPATIENT)
Dept: URBAN - METROPOLITAN AREA CLINIC 57 | Facility: CLINIC | Age: 80
End: 2020-02-21

## 2020-02-21 VITALS
HEART RATE: 71 BPM | WEIGHT: 180 LBS | DIASTOLIC BLOOD PRESSURE: 60 MMHG | SYSTOLIC BLOOD PRESSURE: 94 MMHG | HEIGHT: 66 IN

## 2020-02-21 DIAGNOSIS — Z99.3 WHEELCHAIR BOUND: ICD-10-CM

## 2020-02-21 DIAGNOSIS — K44.9 GASTROESOPHAGEAL REFLUX DISEASE WITH HIATAL HERNIA: ICD-10-CM

## 2020-02-21 DIAGNOSIS — R52 CHRONIC PAIN: ICD-10-CM

## 2020-02-21 DIAGNOSIS — D64.9 CHRONIC ANEMIA: ICD-10-CM

## 2020-02-21 PROCEDURE — 99214 OFFICE O/P EST MOD 30 MIN: CPT | Performed by: INTERNAL MEDICINE

## 2020-02-21 RX ORDER — OMEPRAZOLE 20 MG/1
40 CAPSULE, DELAYED RELEASE ORAL
Qty: 60 | Status: ACTIVE
Start: 2020-02-21

## 2020-02-21 NOTE — SERVICEHPINOTES
The patient is a 79-year-old gentleman who comes in today because of intractable reflux symptoms. He states he has severe heartburn. He was seen before and endoscopy was performed in June at which time a hiatal hernia and esophagitis is noted. He was started on Prilosec. Because of persistent symptoms a CT scan was suggested, however symptoms resolved that time. In the interim he was admitted to [facility] because of pneumonia. He was then sent to rehabilitation and then repeat admitted. He only uses Prilosec now on an as-needed basis. There is a course of his hospitalization he was told that his hemoglobin was approximately 9.3 with stool being negative for occult blood. He has been diagnosed with anemia of chronic disease. He denies any dysphagia, odynophagia, nausea, vomiting, rectal bleeding, melena, or persistent diarrhea or constipation.

## 2020-03-19 ENCOUNTER — OFFICE (OUTPATIENT)
Dept: URBAN - METROPOLITAN AREA CLINIC 57 | Facility: CLINIC | Age: 80
End: 2020-03-19

## 2020-03-19 VITALS — HEIGHT: 66 IN

## 2020-03-19 DIAGNOSIS — K44.9 GASTROESOPHAGEAL REFLUX DISEASE WITH HIATAL HERNIA: ICD-10-CM

## 2020-03-19 PROCEDURE — 99213 OFFICE O/P EST LOW 20 MIN: CPT | Performed by: INTERNAL MEDICINE

## 2020-03-19 PROCEDURE — G0406 INPT/TELE FOLLOW UP 15: HCPCS | Performed by: INTERNAL MEDICINE

## 2020-03-23 ENCOUNTER — OFFICE (OUTPATIENT)
Dept: URBAN - METROPOLITAN AREA CLINIC 57 | Facility: CLINIC | Age: 80
End: 2020-03-23

## 2020-03-23 VITALS — HEIGHT: 66 IN

## 2020-03-23 DIAGNOSIS — K44.9 GASTROESOPHAGEAL REFLUX DISEASE WITH HIATAL HERNIA: ICD-10-CM

## 2020-03-23 PROCEDURE — 99212 OFFICE O/P EST SF 10 MIN: CPT | Performed by: INTERNAL MEDICINE

## 2020-03-23 PROCEDURE — G0406 INPT/TELE FOLLOW UP 15: HCPCS | Performed by: INTERNAL MEDICINE

## 2020-03-23 RX ORDER — SUCRALFATE 1 G/1
TABLET ORAL
Qty: 120 | Status: ACTIVE
Start: 2020-03-23

## 2020-03-23 NOTE — SERVICEHPINOTES
Despite increasing his Nexium to 3 times per day and taking Gaviscon as needed, he still complaints of heartburn. He notes a burning in the substernal and epigastric area. He denies nonsteroidal usage. He is not having any dysphagia or odynophagia.

## 2021-01-23 ENCOUNTER — HOSPITAL ENCOUNTER (EMERGENCY)
Dept: HOSPITAL 54 - ER | Age: 81
Discharge: NURSING, REHAB OR LONG-TERM CARE | End: 2021-01-23
Payer: MEDICARE

## 2021-01-23 VITALS — HEIGHT: 66 IN | WEIGHT: 186 LBS | BODY MASS INDEX: 29.89 KG/M2

## 2021-01-23 VITALS — DIASTOLIC BLOOD PRESSURE: 75 MMHG | SYSTOLIC BLOOD PRESSURE: 145 MMHG

## 2021-01-23 DIAGNOSIS — E11.9: ICD-10-CM

## 2021-01-23 DIAGNOSIS — E78.5: ICD-10-CM

## 2021-01-23 DIAGNOSIS — Z79.899: ICD-10-CM

## 2021-01-23 DIAGNOSIS — F32.9: ICD-10-CM

## 2021-01-23 DIAGNOSIS — E87.1: ICD-10-CM

## 2021-01-23 DIAGNOSIS — R42: ICD-10-CM

## 2021-01-23 DIAGNOSIS — Z95.818: ICD-10-CM

## 2021-01-23 DIAGNOSIS — Z98.890: ICD-10-CM

## 2021-01-23 DIAGNOSIS — I11.0: ICD-10-CM

## 2021-01-23 DIAGNOSIS — Z79.84: ICD-10-CM

## 2021-01-23 DIAGNOSIS — N40.0: ICD-10-CM

## 2021-01-23 DIAGNOSIS — K21.9: ICD-10-CM

## 2021-01-23 DIAGNOSIS — I50.9: ICD-10-CM

## 2021-01-23 DIAGNOSIS — R11.0: Primary | ICD-10-CM

## 2021-01-23 DIAGNOSIS — F41.9: ICD-10-CM

## 2021-01-23 LAB
ALBUMIN SERPL BCP-MCNC: 3.2 G/DL (ref 3.4–5)
ALP SERPL-CCNC: 127 U/L (ref 46–116)
ALT SERPL W P-5'-P-CCNC: 17 U/L (ref 12–78)
AST SERPL W P-5'-P-CCNC: 16 U/L (ref 15–37)
BASOPHILS # BLD AUTO: 0 /CMM (ref 0–0.2)
BASOPHILS NFR BLD AUTO: 0.6 % (ref 0–2)
BILIRUB DIRECT SERPL-MCNC: 0.1 MG/DL (ref 0–0.2)
BILIRUB SERPL-MCNC: 0.3 MG/DL (ref 0.2–1)
BUN SERPL-MCNC: 18 MG/DL (ref 7–18)
CALCIUM SERPL-MCNC: 9 MG/DL (ref 8.5–10.1)
CHLORIDE SERPL-SCNC: 92 MMOL/L (ref 98–107)
CO2 SERPL-SCNC: 28 MMOL/L (ref 21–32)
CREAT SERPL-MCNC: 1.5 MG/DL (ref 0.6–1.3)
EOSINOPHIL NFR BLD AUTO: 0 % (ref 0–6)
GLUCOSE SERPL-MCNC: 103 MG/DL (ref 74–106)
HCT VFR BLD AUTO: 29 % (ref 39–51)
HGB BLD-MCNC: 9.7 G/DL (ref 13.5–17.5)
LIPASE SERPL-CCNC: 138 U/L (ref 73–393)
LYMPHOCYTES NFR BLD AUTO: 0.5 /CMM (ref 0.8–4.8)
LYMPHOCYTES NFR BLD AUTO: 7 % (ref 20–44)
LYMPHOCYTES NFR BLD MANUAL: 8 % (ref 16–48)
MCHC RBC AUTO-ENTMCNC: 33 G/DL (ref 31–36)
MCV RBC AUTO: 86 FL (ref 80–96)
MONOCYTES NFR BLD AUTO: 1.2 /CMM (ref 0.1–1.3)
MONOCYTES NFR BLD AUTO: 16 % (ref 2–12)
MONOCYTES NFR BLD MANUAL: 13 % (ref 0–11)
NEUTROPHILS # BLD AUTO: 5.9 /CMM (ref 1.8–8.9)
NEUTROPHILS NFR BLD AUTO: 76.4 % (ref 43–81)
NEUTS SEG NFR BLD MANUAL: 79 % (ref 42–76)
PLATELET # BLD AUTO: 298 /CMM (ref 150–450)
POTASSIUM SERPL-SCNC: 4.4 MMOL/L (ref 3.5–5.1)
PROT SERPL-MCNC: 8.8 G/DL (ref 6.4–8.2)
RBC # BLD AUTO: 3.39 MIL/UL (ref 4.5–6)
SODIUM SERPL-SCNC: 130 MMOL/L (ref 136–145)
WBC NRBC COR # BLD AUTO: 7.7 K/UL (ref 4.3–11)

## 2021-01-23 PROCEDURE — 85007 BL SMEAR W/DIFF WBC COUNT: CPT

## 2021-01-23 PROCEDURE — 36415 COLL VENOUS BLD VENIPUNCTURE: CPT

## 2021-01-23 PROCEDURE — 80076 HEPATIC FUNCTION PANEL: CPT

## 2021-01-23 PROCEDURE — 96372 THER/PROPH/DIAG INJ SC/IM: CPT

## 2021-01-23 PROCEDURE — 85025 COMPLETE CBC W/AUTO DIFF WBC: CPT

## 2021-01-23 PROCEDURE — 99284 EMERGENCY DEPT VISIT MOD MDM: CPT

## 2021-01-23 PROCEDURE — 83690 ASSAY OF LIPASE: CPT

## 2021-01-23 PROCEDURE — 80048 BASIC METABOLIC PNL TOTAL CA: CPT

## 2021-01-23 NOTE — NUR
CALLED [facility] FOR TRANSPORT TO THE [facility] [place]. ETA 1930.

## 2021-01-23 NOTE — NUR
PT AAOX4, VSS, RESPIRATIONS EVEN AND UNLABORED ON RA W/ NAD NOTED. PT CONNECTED 
TO THE MONITOR AND POX. PT STATES "I FEEL OKAY NOW. JUST WAITING FOR MY RIDE 
HOME".

## 2021-01-23 NOTE — NUR
Patient discharged to [facility] in stable condition via ambulance. Written and verbal 
after care instructions given. Patient verbalizes understanding of instruction.

## 2021-01-23 NOTE — NUR
PT [id number] [facility] JERRY FOR FEELING WEAK AND DIZZY. ALSO C/O CHRONIC BACK PAIN. PT 
IS AAOX4, NOT IN RESPIRATORY DISTRESS, HOOKED TO CARDIAC MONITOR, KEPT RESTED 
AND COMFORTABLE. WILL CONTINUE TO MONITOR.

## 2021-01-23 NOTE — NUR
PATIENT C/O BURNING AT HIS STOMACH AND FEELING NAUSEOUS. MADE MD AWARE. 
RECEIVED VERBAL ORDER OF ZOFRAN 4MG IM AND MAALOX 30ML UDC PO. CARRIED OUT

## 2021-06-03 ENCOUNTER — HOSPITAL ENCOUNTER (INPATIENT)
Dept: HOSPITAL 54 - ER | Age: 81
LOS: 4 days | Discharge: NURSING, REHAB OR LONG-TERM CARE | DRG: 291 | End: 2021-06-07
Attending: NURSE PRACTITIONER | Admitting: NURSE PRACTITIONER
Payer: MEDICARE

## 2021-06-03 VITALS — BODY MASS INDEX: 28.77 KG/M2 | WEIGHT: 179 LBS | HEIGHT: 66 IN

## 2021-06-03 DIAGNOSIS — F32.9: ICD-10-CM

## 2021-06-03 DIAGNOSIS — Z95.1: ICD-10-CM

## 2021-06-03 DIAGNOSIS — N18.9: ICD-10-CM

## 2021-06-03 DIAGNOSIS — D63.8: ICD-10-CM

## 2021-06-03 DIAGNOSIS — Z83.3: ICD-10-CM

## 2021-06-03 DIAGNOSIS — K21.9: ICD-10-CM

## 2021-06-03 DIAGNOSIS — I13.0: Primary | ICD-10-CM

## 2021-06-03 DIAGNOSIS — E87.1: ICD-10-CM

## 2021-06-03 DIAGNOSIS — M89.9: ICD-10-CM

## 2021-06-03 DIAGNOSIS — Z96.643: ICD-10-CM

## 2021-06-03 DIAGNOSIS — J90: ICD-10-CM

## 2021-06-03 DIAGNOSIS — E66.9: ICD-10-CM

## 2021-06-03 DIAGNOSIS — Z82.49: ICD-10-CM
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
## 2021-06-03 DIAGNOSIS — E11.22: ICD-10-CM

## 2021-06-03 DIAGNOSIS — T17.920A: ICD-10-CM
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
## 2021-06-03 DIAGNOSIS — G89.4: ICD-10-CM

## 2021-06-03 DIAGNOSIS — I25.10: ICD-10-CM

## 2021-06-03 DIAGNOSIS — G82.20: ICD-10-CM

## 2021-06-03 DIAGNOSIS — E78.5: ICD-10-CM

## 2021-06-03 DIAGNOSIS — M48.00: ICD-10-CM

## 2021-06-03 DIAGNOSIS — N40.0: ICD-10-CM

## 2021-06-03 DIAGNOSIS — Z20.822: ICD-10-CM
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
## 2021-06-03 DIAGNOSIS — J96.91: ICD-10-CM

## 2021-06-03 DIAGNOSIS — N17.0: ICD-10-CM

## 2021-06-03 DIAGNOSIS — I50.31: ICD-10-CM

## 2021-06-03 DIAGNOSIS — F41.9: ICD-10-CM

## 2021-06-03 LAB
ALBUMIN SERPL BCP-MCNC: 3 G/DL (ref 3.4–5)
ALP SERPL-CCNC: 192 U/L (ref 46–116)
ALT SERPL W P-5'-P-CCNC: 19 U/L (ref 12–78)
AST SERPL W P-5'-P-CCNC: 17 U/L (ref 15–37)
BASOPHILS # BLD AUTO: 0 /CMM (ref 0–0.2)
BASOPHILS NFR BLD AUTO: 0.4 % (ref 0–2)
BASOPHILS NFR BLD MANUAL: 0 % (ref 0–2)
BILIRUB SERPL-MCNC: 0.3 MG/DL (ref 0.2–1)
BUN SERPL-MCNC: 18 MG/DL (ref 7–18)
CALCIUM SERPL-MCNC: 9.1 MG/DL (ref 8.5–10.1)
CHLORIDE SERPL-SCNC: 97 MMOL/L (ref 98–107)
CO2 SERPL-SCNC: 30 MMOL/L (ref 21–32)
CREAT SERPL-MCNC: 1.5 MG/DL (ref 0.6–1.3)
D DIMER PPP FEU-MCNC: 0.81 MG/L(FEU (ref 0.17–0.5)
EOSINOPHIL NFR BLD AUTO: 0.1 % (ref 0–6)
EOSINOPHIL NFR BLD MANUAL: 0 % (ref 0–4)
GLUCOSE SERPL-MCNC: 115 MG/DL (ref 74–106)
HCT VFR BLD AUTO: 26 % (ref 39–51)
HGB BLD-MCNC: 8.7 G/DL (ref 13.5–17.5)
LYMPHOCYTES NFR BLD AUTO: 0.6 /CMM (ref 0.8–4.8)
LYMPHOCYTES NFR BLD AUTO: 6.5 % (ref 20–44)
LYMPHOCYTES NFR BLD MANUAL: 6 % (ref 16–48)
MCHC RBC AUTO-ENTMCNC: 33 G/DL (ref 31–36)
MCV RBC AUTO: 84 FL (ref 80–96)
MONOCYTES NFR BLD AUTO: 1.5 /CMM (ref 0.1–1.3)
MONOCYTES NFR BLD AUTO: 16.9 % (ref 2–12)
MONOCYTES NFR BLD MANUAL: 15 % (ref 0–11)
NEUTROPHILS # BLD AUTO: 6.9 /CMM (ref 1.8–8.9)
NEUTROPHILS NFR BLD AUTO: 76.1 % (ref 43–81)
NEUTS BAND NFR BLD MANUAL: 2 % (ref 0–5)
NEUTS SEG NFR BLD MANUAL: 77 % (ref 42–76)
NT-PROBNP SERPL-MCNC: 5603 PG/ML (ref 0–125)
PLATELET # BLD AUTO: 278 /CMM (ref 150–450)
POTASSIUM SERPL-SCNC: 4.6 MMOL/L (ref 3.5–5.1)
PROT SERPL-MCNC: 8.3 G/DL (ref 6.4–8.2)
RBC # BLD AUTO: 3.15 MIL/UL (ref 4.5–6)
SODIUM SERPL-SCNC: 135 MMOL/L (ref 136–145)
WBC NRBC COR # BLD AUTO: 9.1 K/UL (ref 4.3–11)

## 2021-06-03 PROCEDURE — U0003 INFECTIOUS AGENT DETECTION BY NUCLEIC ACID (DNA OR RNA); SEVERE ACUTE RESPIRATORY SYNDROME CORONAVIRUS 2 (SARS-COV-2) (CORONAVIRUS DISEASE [COVID-19]), AMPLIFIED PROBE TECHNIQUE, MAKING USE OF HIGH THROUGHPUT TECHNOLOGIES AS DESCRIBED BY CMS-2020-01-R: HCPCS

## 2021-06-03 PROCEDURE — G0378 HOSPITAL OBSERVATION PER HR: HCPCS

## 2021-06-03 NOTE — NUR
LIAT FROM THE [facility] C/O LOW O2 S/P "CHOKED WHILE TAKING MEDICATION"  30 
MINUTES PTA. O2 SAT 91% ROOM AIR , PT AAOX4, DENIES CP. NOT IN ACUTE DISTRESS, 
VSS ,PENDING ER PROVIDER REVAL

## 2021-06-04 VITALS — SYSTOLIC BLOOD PRESSURE: 151 MMHG | DIASTOLIC BLOOD PRESSURE: 79 MMHG

## 2021-06-04 VITALS — DIASTOLIC BLOOD PRESSURE: 83 MMHG | SYSTOLIC BLOOD PRESSURE: 153 MMHG

## 2021-06-04 VITALS — SYSTOLIC BLOOD PRESSURE: 115 MMHG | DIASTOLIC BLOOD PRESSURE: 62 MMHG

## 2021-06-04 VITALS — SYSTOLIC BLOOD PRESSURE: 165 MMHG | DIASTOLIC BLOOD PRESSURE: 82 MMHG

## 2021-06-04 VITALS — DIASTOLIC BLOOD PRESSURE: 54 MMHG | SYSTOLIC BLOOD PRESSURE: 105 MMHG

## 2021-06-04 VITALS — SYSTOLIC BLOOD PRESSURE: 128 MMHG | DIASTOLIC BLOOD PRESSURE: 71 MMHG

## 2021-06-04 LAB
BASE EXCESS BLDA CALC-SCNC: -1.8 MMOL/L
DO-HGB MFR BLDA: 42.1 MMHG
FERRITIN SERPL-MCNC: 329 NG/ML (ref 8–388)
INHALED O2 CONCENTRATION: 21 %
IRON SERPL-MCNC: 19 UG/DL (ref 50–175)
PCO2 TEMP ADJ BLDA: 38 MMHG (ref 35–45)
PH TEMP ADJ BLDA: 7.4 [PH] (ref 7.35–7.45)
PO2 TEMP ADJ BLDA: 62.1 MMHG (ref 75–100)
SAO2 % BLDA: 87 % (ref 92–98.5)
TIBC SERPL-MCNC: 150 UG/DL (ref 250–450)

## 2021-06-04 PROCEDURE — 05H533Z INSERTION OF INFUSION DEVICE INTO RIGHT SUBCLAVIAN VEIN, PERCUTANEOUS APPROACH: ICD-10-PCS | Performed by: NURSE PRACTITIONER

## 2021-06-04 PROCEDURE — B546ZZA ULTRASONOGRAPHY OF RIGHT SUBCLAVIAN VEIN, GUIDANCE: ICD-10-PCS | Performed by: NURSE PRACTITIONER

## 2021-06-04 RX ADMIN — Medication SCH MG: at 08:40

## 2021-06-04 RX ADMIN — GUAIFENESIN SCH MG: 600 TABLET, EXTENDED RELEASE ORAL at 08:40

## 2021-06-04 RX ADMIN — ACETAMINOPHEN PRN MG: 325 TABLET ORAL at 16:30

## 2021-06-04 RX ADMIN — SODIUM CHLORIDE PRN MLS/HR: 9 INJECTION, SOLUTION INTRAVENOUS at 02:00

## 2021-06-04 RX ADMIN — PIPERACILLIN SODIUM AND TAZOBACTAM SODIUM SCH MLS/HR: .375; 3 INJECTION, POWDER, LYOPHILIZED, FOR SOLUTION INTRAVENOUS at 12:39

## 2021-06-04 RX ADMIN — ENOXAPARIN SODIUM SCH MG: 40 INJECTION SUBCUTANEOUS at 02:17

## 2021-06-04 RX ADMIN — Medication SCH MG: at 08:39

## 2021-06-04 RX ADMIN — PIPERACILLIN SODIUM AND TAZOBACTAM SODIUM SCH MLS/HR: .375; 3 INJECTION, POWDER, LYOPHILIZED, FOR SOLUTION INTRAVENOUS at 05:12

## 2021-06-04 RX ADMIN — ATORVASTATIN CALCIUM SCH MG: 10 TABLET, FILM COATED ORAL at 21:26

## 2021-06-04 RX ADMIN — Medication SCH MG: at 15:22

## 2021-06-04 RX ADMIN — FERROUS SULFATE TAB 325 MG (65 MG ELEMENTAL FE) SCH MG: 325 (65 FE) TAB at 08:39

## 2021-06-04 RX ADMIN — ALBUTEROL SULFATE SCH MG: 2.5 SOLUTION RESPIRATORY (INHALATION) at 07:52

## 2021-06-04 RX ADMIN — GUAIFENESIN SCH MG: 600 TABLET, EXTENDED RELEASE ORAL at 21:24

## 2021-06-04 RX ADMIN — SODIUM CHLORIDE PRN MLS/HR: 9 INJECTION, SOLUTION INTRAVENOUS at 18:42

## 2021-06-04 RX ADMIN — ACETAMINOPHEN PRN MG: 325 TABLET ORAL at 23:48

## 2021-06-04 RX ADMIN — PIPERACILLIN SODIUM AND TAZOBACTAM SODIUM SCH MLS/HR: .375; 3 INJECTION, POWDER, LYOPHILIZED, FOR SOLUTION INTRAVENOUS at 21:24

## 2021-06-04 RX ADMIN — TAMSULOSIN HYDROCHLORIDE SCH MG: 0.4 CAPSULE ORAL at 21:26

## 2021-06-04 RX ADMIN — Medication SCH MG: at 11:18

## 2021-06-04 RX ADMIN — PANTOPRAZOLE SODIUM SCH MG: 40 TABLET, DELAYED RELEASE ORAL at 08:38

## 2021-06-04 RX ADMIN — PIPERACILLIN SODIUM AND TAZOBACTAM SODIUM SCH MLS/HR: .375; 3 INJECTION, POWDER, LYOPHILIZED, FOR SOLUTION INTRAVENOUS at 02:16

## 2021-06-04 RX ADMIN — TRAZODONE HYDROCHLORIDE SCH MG: 50 TABLET ORAL at 21:26

## 2021-06-04 RX ADMIN — ENOXAPARIN SODIUM SCH MG: 40 INJECTION SUBCUTANEOUS at 21:27

## 2021-06-04 RX ADMIN — ALBUTEROL SULFATE SCH MG: 2.5 SOLUTION RESPIRATORY (INHALATION) at 19:30

## 2021-06-04 RX ADMIN — ACETAMINOPHEN PRN MG: 325 TABLET ORAL at 08:40

## 2021-06-04 RX ADMIN — Medication SCH MG: at 07:52

## 2021-06-04 RX ADMIN — ALBUTEROL SULFATE SCH MG: 2.5 SOLUTION RESPIRATORY (INHALATION) at 11:18

## 2021-06-04 RX ADMIN — FERROUS SULFATE TAB 325 MG (65 MG ELEMENTAL FE) SCH MG: 325 (65 FE) TAB at 16:30

## 2021-06-04 RX ADMIN — Medication SCH MG: at 19:30

## 2021-06-04 RX ADMIN — ALBUTEROL SULFATE SCH MG: 2.5 SOLUTION RESPIRATORY (INHALATION) at 15:22

## 2021-06-04 NOTE — NUR
RN OPENING NOTES:



RECEIVED PT A/OX4 IN BED RESTING COMFORTABLY. PATIENT IN NO S/SX OF ACUTE DISTRESS AT THIS 
TIME. NO SOB NOTED. PATIENT'S BREATHING IS EVEN AND UNLABORED. PATIENT IS ON 2L OF OXYGEN 
VIA NC; TOLERATING WELL. PATIENT ON TELE MONITORING READING SINUS RHYTHM HR IS @70s AT THE 
TIME OF RECEIVED. NOTED IV SITE ON R HAND #20 AND R UA MIDLINE#18 ; BOTH PATENT, INTACT AND 
FLUSHING WELL; NO S/S OF INFECTION OR INFILTRATION. WITH IV FLUID RUNNING AS ORDERED. SAFETY 
MEASURES HAVE BEEN PROVIDED AND IMPLEMENTED. PATIENT BED ALARM IS ON. HEAD OF BED ELEVATED. 
BED IS LOCKED, IN LOWEST POSITION AND SIDE RAILS UP. CALL LIGHT WITHIN REACH OF THE PATIENT. 
APPLICABLE ISOLATION PRECAUTIONS IN PLACE. WILL CONTINUE TO MONITOR AND REASSESS FOR ANY 
CHANGES AND WILL CARRY OUT ANY ONGOING AND ACTIVE MD ORDER.

## 2021-06-04 NOTE — NUR
RN NOTES



RECEIVED ER ADMISSION REPORT FROM [facility]. ALL PERTINENT ADMISSION INFO REGARDING PT NOTED. 
WILL WAIT FOR PT TO BE TRANSFERRED TO UNIT AND ADDRESS NEEDS ACCORDINGLY. CHARGE RN MADE 
AWARE.

## 2021-06-04 NOTE — NUR
RN OPENING NOTES 



RECEIVED PT IN BED. A/O X4. ON 3L OF 02 VIA NC. NO SOB OR ANY SIGNS OF RESPIRATORY DISTRESS. 
SATURATING @99%. IV ACCESS AT R HAND #20 INTACT, PATENT AND FLUSHING WELL. SR ON TELE 
MONITOR. ON SOFT DIET. SAFETY MEASURES IMPLEMENTED. CALL LIGHT WITHIN REACH. BED LOCKED AND 
IN LOWEST POSITION WITH SIDE RAILS UP X3. WILL CONTINUE TO MONITOR.

## 2021-06-04 NOTE — NUR
RN CLOSING NOTE: 



PATIENT REMAINS IN ROOM IN NO SIGNS OF RESPIRATORY DISTRESS, PATIENT STILL ON 3L OF 02 VIA 
NC; TOLERATING WELL SATURATING @ >95% SP02. SAFETY MEASURES IMPLEMENTED, BED IN LOWEST 
POSITION, LOCKED, SIDE RAILS UP, CALL LIGHT WITHIN REACH. ALL NEEDS AND ORDERS ADDRESSED 
DURING THE SHIFT. IV ACCESS MAINTAINED INTACT, SECURED AND FLUSHING WELL. ALL DUE MEDS GIVEN 
AS ORDERED & SCHEDULED ; PATIENT TOLERATED WELL. PATIENT KEPT CLEAN AND COMFORTABLE WITHIN 
THE SHIFT. PATIENT ENDORSED TO INCOMING SHIFT RN WITH STABLE VITAL SIGN AND FOR CONTINUITY 
OF CARE.

## 2021-06-04 NOTE — NUR
RN NOTES



PATIENT REMAINS IN NO ACUTE RESPIRATORY DISTRESS AT THIS TIME, NO CHANGES TO 
CONDITION/STATUS. AM PATIENT CARE DONE. CHARGE RN WELL AWARE. WILL CONTINUE TO MONITOR AND 
REASSESS FOR ANY CHANGES THROUGHOUT THE SHIFT

## 2021-06-04 NOTE — NUR
RN NOTES



NO CHANGE IN PATIENT CONDITION AT THIS TIME PATIENT VITALS STABLE, NO SIGNS OF ACUTE 
RESPIRATORY DISTRESS. CHARGE RN MADE AWARE. WILL CONTINUE TO MONITOR AND REASSESS FOR ANY 
CHANGES THROUGHOUT THE SHIFT.

## 2021-06-04 NOTE — NUR
RN CLOSING NOTES



NO SIGNIFICANT CHANGES THROUGHOUT THE SHIFT. NO SOB OR ANY DISTRESS. NO PAIN REPORTED AT 
THIS TIME. IN STABLE CONDITION. ALL DUE MEDS GIVEN. NEEDS ATTENDED. SHANDRA MIDLINE INSERTED, 
INTACT AND PATENT. SAFETY MEASURES STILL IN PLACE. WILL ENDORSE TO NIGHT NURSE FOR BLAINE.

## 2021-06-04 NOTE — NUR
RN NOTES



RECEIVED PT FROM ER VIA GURNEY ACCOMPANIED BY 2 ER STAFF AND TRANSFERRED TO BED VIA 2 PERSON 
ASSIST. PT IS ALERT AND ORIENTED X4. PT ON 3L OF O2 VIA NC WITH RESPIRATIONS EVEN AND 
UNLABORED; CURRENT O2 SAT UPON RECEIVED IN THE UNIT IS 97%. COMPREHENSIVE PHYSICAL 
ASSESSMENT AND PATIENT CARE DONE. CALL LIGHT WITHIN REACH, SAFETY MEASURES AND ISOLATION 
PRECAUTION IN PLACE, WILL CONTINUE MONITOR AND ASSESS THROUGHOUT THE SHIFT. WILL CARRY OUT 
MD ORDERS ACCORDINGLY. CHARGE RN MADE AWARE.

## 2021-06-05 VITALS — DIASTOLIC BLOOD PRESSURE: 69 MMHG | SYSTOLIC BLOOD PRESSURE: 120 MMHG

## 2021-06-05 VITALS — DIASTOLIC BLOOD PRESSURE: 67 MMHG | SYSTOLIC BLOOD PRESSURE: 122 MMHG

## 2021-06-05 VITALS — SYSTOLIC BLOOD PRESSURE: 116 MMHG | DIASTOLIC BLOOD PRESSURE: 56 MMHG

## 2021-06-05 VITALS — SYSTOLIC BLOOD PRESSURE: 113 MMHG | DIASTOLIC BLOOD PRESSURE: 60 MMHG

## 2021-06-05 VITALS — SYSTOLIC BLOOD PRESSURE: 115 MMHG | DIASTOLIC BLOOD PRESSURE: 49 MMHG

## 2021-06-05 VITALS — DIASTOLIC BLOOD PRESSURE: 59 MMHG | SYSTOLIC BLOOD PRESSURE: 131 MMHG

## 2021-06-05 LAB
ALBUMIN SERPL BCP-MCNC: 2.6 G/DL (ref 3.4–5)
ALP SERPL-CCNC: 146 U/L (ref 46–116)
ALT SERPL W P-5'-P-CCNC: 14 U/L (ref 12–78)
AST SERPL W P-5'-P-CCNC: 10 U/L (ref 15–37)
BASOPHILS # BLD AUTO: 0 /CMM (ref 0–0.2)
BASOPHILS NFR BLD AUTO: 0.4 % (ref 0–2)
BILIRUB SERPL-MCNC: 0.4 MG/DL (ref 0.2–1)
BUN SERPL-MCNC: 22 MG/DL (ref 7–18)
CALCIUM SERPL-MCNC: 8.7 MG/DL (ref 8.5–10.1)
CHLORIDE SERPL-SCNC: 100 MMOL/L (ref 98–107)
CHOLEST SERPL-MCNC: 111 MG/DL (ref ?–200)
CK SERPL-CCNC: 43 U/L (ref 39–308)
CO2 SERPL-SCNC: 31 MMOL/L (ref 21–32)
CREAT SERPL-MCNC: 1.6 MG/DL (ref 0.6–1.3)
EOSINOPHIL NFR BLD AUTO: 0.1 % (ref 0–6)
GLUCOSE SERPL-MCNC: 92 MG/DL (ref 74–106)
HCT VFR BLD AUTO: 25 % (ref 39–51)
HDLC SERPL-MCNC: 43 MG/DL (ref 40–60)
HGB BLD-MCNC: 8.4 G/DL (ref 13.5–17.5)
LDLC SERPL DIRECT ASSAY-MCNC: 51 MG/DL (ref 0–99)
LYMPHOCYTES NFR BLD AUTO: 0.6 /CMM (ref 0.8–4.8)
LYMPHOCYTES NFR BLD AUTO: 7.2 % (ref 20–44)
LYMPHOCYTES NFR BLD MANUAL: 6 % (ref 16–48)
MAGNESIUM SERPL-MCNC: 1.8 MG/DL (ref 1.8–2.4)
MCHC RBC AUTO-ENTMCNC: 33 G/DL (ref 31–36)
MCV RBC AUTO: 83 FL (ref 80–96)
MONOCYTES NFR BLD AUTO: 1.8 /CMM (ref 0.1–1.3)
MONOCYTES NFR BLD AUTO: 21.1 % (ref 2–12)
MONOCYTES NFR BLD MANUAL: 18 % (ref 0–11)
NEUTROPHILS # BLD AUTO: 6 /CMM (ref 1.8–8.9)
NEUTROPHILS NFR BLD AUTO: 71.2 % (ref 43–81)
NEUTS SEG NFR BLD MANUAL: 76 % (ref 42–76)
PHOSPHATE SERPL-MCNC: 4.8 MG/DL (ref 2.5–4.9)
PLATELET # BLD AUTO: 242 /CMM (ref 150–450)
POTASSIUM SERPL-SCNC: 3.6 MMOL/L (ref 3.5–5.1)
PROT SERPL-MCNC: 7.8 G/DL (ref 6.4–8.2)
RBC # BLD AUTO: 3.01 MIL/UL (ref 4.5–6)
SODIUM SERPL-SCNC: 139 MMOL/L (ref 136–145)
TRIGL SERPL-MCNC: 68 MG/DL (ref 30–150)
WBC NRBC COR # BLD AUTO: 8.5 K/UL (ref 4.3–11)

## 2021-06-05 RX ADMIN — FERROUS SULFATE TAB 325 MG (65 MG ELEMENTAL FE) SCH MG: 325 (65 FE) TAB at 08:26

## 2021-06-05 RX ADMIN — Medication SCH MG: at 11:25

## 2021-06-05 RX ADMIN — GUAIFENESIN SCH MG: 600 TABLET, EXTENDED RELEASE ORAL at 08:26

## 2021-06-05 RX ADMIN — SODIUM CHLORIDE PRN MLS/HR: 9 INJECTION, SOLUTION INTRAVENOUS at 10:47

## 2021-06-05 RX ADMIN — FUROSEMIDE SCH MG: 10 INJECTION INTRAVENOUS at 09:52

## 2021-06-05 RX ADMIN — Medication SCH MG: at 07:33

## 2021-06-05 RX ADMIN — Medication SCH MG: at 08:25

## 2021-06-05 RX ADMIN — ALBUTEROL SULFATE SCH MG: 2.5 SOLUTION RESPIRATORY (INHALATION) at 19:30

## 2021-06-05 RX ADMIN — ALBUTEROL SULFATE SCH MG: 2.5 SOLUTION RESPIRATORY (INHALATION) at 14:44

## 2021-06-05 RX ADMIN — Medication SCH MG: at 14:44

## 2021-06-05 RX ADMIN — TAMSULOSIN HYDROCHLORIDE SCH MG: 0.4 CAPSULE ORAL at 21:02

## 2021-06-05 RX ADMIN — PIPERACILLIN SODIUM AND TAZOBACTAM SODIUM SCH MLS/HR: .375; 3 INJECTION, POWDER, LYOPHILIZED, FOR SOLUTION INTRAVENOUS at 13:04

## 2021-06-05 RX ADMIN — PANTOPRAZOLE SODIUM SCH MG: 40 TABLET, DELAYED RELEASE ORAL at 08:26

## 2021-06-05 RX ADMIN — FERROUS SULFATE TAB 325 MG (65 MG ELEMENTAL FE) SCH MG: 325 (65 FE) TAB at 17:02

## 2021-06-05 RX ADMIN — PIPERACILLIN SODIUM AND TAZOBACTAM SODIUM SCH MLS/HR: .375; 3 INJECTION, POWDER, LYOPHILIZED, FOR SOLUTION INTRAVENOUS at 05:33

## 2021-06-05 RX ADMIN — ACETAMINOPHEN PRN MG: 325 TABLET ORAL at 08:47

## 2021-06-05 RX ADMIN — ATORVASTATIN CALCIUM SCH MG: 10 TABLET, FILM COATED ORAL at 21:03

## 2021-06-05 RX ADMIN — TRAZODONE HYDROCHLORIDE SCH MG: 50 TABLET ORAL at 21:02

## 2021-06-05 RX ADMIN — Medication SCH MG: at 19:30

## 2021-06-05 RX ADMIN — GUAIFENESIN SCH MG: 600 TABLET, EXTENDED RELEASE ORAL at 20:05

## 2021-06-05 RX ADMIN — ALBUTEROL SULFATE SCH MG: 2.5 SOLUTION RESPIRATORY (INHALATION) at 11:25

## 2021-06-05 RX ADMIN — FUROSEMIDE SCH MG: 10 INJECTION INTRAVENOUS at 17:02

## 2021-06-05 RX ADMIN — ALBUTEROL SULFATE SCH MG: 2.5 SOLUTION RESPIRATORY (INHALATION) at 07:33

## 2021-06-05 RX ADMIN — ENOXAPARIN SODIUM SCH MG: 40 INJECTION SUBCUTANEOUS at 21:04

## 2021-06-05 NOTE — NUR
lab called in and stated that potassium 1.7 calcium 4.5 albumin 1.1 mg 0.9  NP 
ileana Brannon notified and orders noted will recheck stat bmp,mg and phos.as ordered

## 2021-06-05 NOTE — NUR
RN CLOSING NOTE: 



PATIENT REMAINS IN ROOM IN NO SIGNS OF RESPIRATORY DISTRESS, PATIENT STILL ON 2L OF 02 VIA 
NC; TOLERATING WELL SATURATING @ >95% SP02. SAFETY MEASURES IMPLEMENTED, BED IN LOWEST 
POSITION, LOCKED, SIDE RAILS UP, CALL LIGHT WITHIN REACH. ALL NEEDS AND ORDERS ADDRESSED 
DURING THE SHIFT. IV ACCESS MAINTAINED INTACT, SECURED AND FLUSHING WELL. ALL DUE MEDS GIVEN 
AS ORDERED & SCHEDULED ; PATIENT TOLERATED WELL. PATIENT KEPT CLEAN AND COMFORTABLE WITHIN 
THE SHIFT. PATIENT ENDORSED TO INCOMING SHIFT RN WITH STABLE VITAL SIGN AND FOR CONTINUITY 
OF CARE.

## 2021-06-05 NOTE — NUR
RN OPENING NOTES

Patient is alert and oriented. Patient is breathing even and unlabored. 0N 2 LITERS 02 via 
n/c with o2 sat of 98%. Right upper arm midline running normal saline at 75 cc/hour. HOB 
kept elevated. Bed is in lowest an d locked position. Call light with in reach.

## 2021-06-05 NOTE — NUR
RN CLOSING NOTES

Patient is alert and oriented. Patient is breathing even and unlabored. 0N 1 LITERS 02 via 
n/c with o2 sat of 96%. Right upper arm midline saline lock. Urine collected via straight 
cath due to patient being incontinent.  HOB kept elevated. Bed is in lowest an d locked 
position. Call light with in reach.

## 2021-06-05 NOTE — NUR
rechecked lab work results  potassium 3.6 albumon 2.6 mag 1.8 calcium  8.7  Dr Forde and TRENT Abel was notified

## 2021-06-05 NOTE — NUR
Received order from TRENT Abel to d/c patient's normal saline iv fluids. Order noted and 
carried out. IV fluids removed from bedside.

## 2021-06-06 VITALS — SYSTOLIC BLOOD PRESSURE: 137 MMHG | DIASTOLIC BLOOD PRESSURE: 58 MMHG

## 2021-06-06 VITALS — DIASTOLIC BLOOD PRESSURE: 72 MMHG | SYSTOLIC BLOOD PRESSURE: 132 MMHG

## 2021-06-06 VITALS — DIASTOLIC BLOOD PRESSURE: 63 MMHG | SYSTOLIC BLOOD PRESSURE: 127 MMHG

## 2021-06-06 VITALS — DIASTOLIC BLOOD PRESSURE: 60 MMHG | SYSTOLIC BLOOD PRESSURE: 129 MMHG

## 2021-06-06 VITALS — SYSTOLIC BLOOD PRESSURE: 138 MMHG | DIASTOLIC BLOOD PRESSURE: 62 MMHG

## 2021-06-06 VITALS — DIASTOLIC BLOOD PRESSURE: 55 MMHG | SYSTOLIC BLOOD PRESSURE: 118 MMHG

## 2021-06-06 LAB
BASOPHILS # BLD AUTO: 0 /CMM (ref 0–0.2)
BASOPHILS NFR BLD AUTO: 0.5 % (ref 0–2)
BILIRUB UR QL STRIP: NEGATIVE
BUN SERPL-MCNC: 19 MG/DL (ref 7–18)
CALCIUM SERPL-MCNC: 8.5 MG/DL (ref 8.5–10.1)
CHLORIDE SERPL-SCNC: 98 MMOL/L (ref 98–107)
CO2 SERPL-SCNC: 29 MMOL/L (ref 21–32)
COLOR UR: YELLOW
CREAT SERPL-MCNC: 1.5 MG/DL (ref 0.6–1.3)
CREAT UR-MCNC: 19.2 MG/DL (ref 30–125)
EOSINOPHIL NFR BLD AUTO: 0.1 % (ref 0–6)
EOSINOPHIL NFR BLD MANUAL: 1 % (ref 0–4)
GLUCOSE SERPL-MCNC: 88 MG/DL (ref 74–106)
GLUCOSE UR STRIP-MCNC: NEGATIVE MG/DL
HCT VFR BLD AUTO: 23 % (ref 39–51)
HEMOCCULT STL QL: NEGATIVE
HGB BLD-MCNC: 7.7 G/DL (ref 13.5–17.5)
LEUKOCYTE ESTERASE UR QL STRIP: NEGATIVE
LYMPHOCYTES NFR BLD AUTO: 0.7 /CMM (ref 0.8–4.8)
LYMPHOCYTES NFR BLD AUTO: 9.1 % (ref 20–44)
LYMPHOCYTES NFR BLD MANUAL: 7 % (ref 16–48)
MAGNESIUM SERPL-MCNC: 2 MG/DL (ref 1.8–2.4)
MCHC RBC AUTO-ENTMCNC: 34 G/DL (ref 31–36)
MCV RBC AUTO: 83 FL (ref 80–96)
MONOCYTES NFR BLD AUTO: 1.8 /CMM (ref 0.1–1.3)
MONOCYTES NFR BLD AUTO: 23.4 % (ref 2–12)
MONOCYTES NFR BLD MANUAL: 19 % (ref 0–11)
NEUTROPHILS # BLD AUTO: 5.1 /CMM (ref 1.8–8.9)
NEUTROPHILS NFR BLD AUTO: 66.9 % (ref 43–81)
NEUTS SEG NFR BLD MANUAL: 73 % (ref 42–76)
NITRITE UR QL STRIP: NEGATIVE
PH UR STRIP: 7 [PH] (ref 5–8)
PHOSPHATE SERPL-MCNC: 3.8 MG/DL (ref 2.5–4.9)
PLATELET # BLD AUTO: 218 /CMM (ref 150–450)
POTASSIUM SERPL-SCNC: 4.6 MMOL/L (ref 3.5–5.1)
PROT UR QL STRIP: NEGATIVE MG/DL
PROT UR-MCNC: 15 MG/DL (ref 0–11.9)
PTH-INTACT SERPL-MCNC: 23 PG/ML (ref 15–65)
RBC # BLD AUTO: 2.8 MIL/UL (ref 4.5–6)
SODIUM SERPL-SCNC: 135 MMOL/L (ref 136–145)
SODIUM UR-SCNC: 85 MMOL/L (ref 40–220)
UROBILINOGEN UR STRIP-MCNC: 0.2 EU/DL
WBC NRBC COR # BLD AUTO: 7.6 K/UL (ref 4.3–11)

## 2021-06-06 RX ADMIN — GUAIFENESIN SCH MG: 600 TABLET, EXTENDED RELEASE ORAL at 21:13

## 2021-06-06 RX ADMIN — ALBUTEROL SULFATE SCH MG: 2.5 SOLUTION RESPIRATORY (INHALATION) at 15:30

## 2021-06-06 RX ADMIN — Medication SCH MG: at 09:51

## 2021-06-06 RX ADMIN — ALBUTEROL SULFATE SCH MG: 2.5 SOLUTION RESPIRATORY (INHALATION) at 19:30

## 2021-06-06 RX ADMIN — FUROSEMIDE SCH MG: 10 INJECTION INTRAVENOUS at 09:52

## 2021-06-06 RX ADMIN — FERROUS SULFATE TAB 325 MG (65 MG ELEMENTAL FE) SCH MG: 325 (65 FE) TAB at 09:52

## 2021-06-06 RX ADMIN — TRAZODONE HYDROCHLORIDE SCH MG: 50 TABLET ORAL at 21:16

## 2021-06-06 RX ADMIN — ATORVASTATIN CALCIUM SCH MG: 10 TABLET, FILM COATED ORAL at 21:14

## 2021-06-06 RX ADMIN — FLUOXETINE HYDROCHLORIDE SCH MG: 20 CAPSULE ORAL at 09:52

## 2021-06-06 RX ADMIN — FERROUS SULFATE TAB 325 MG (65 MG ELEMENTAL FE) SCH MG: 325 (65 FE) TAB at 15:57

## 2021-06-06 RX ADMIN — FUROSEMIDE SCH MG: 10 INJECTION INTRAVENOUS at 15:56

## 2021-06-06 RX ADMIN — ALBUTEROL SULFATE SCH MG: 2.5 SOLUTION RESPIRATORY (INHALATION) at 11:29

## 2021-06-06 RX ADMIN — Medication SCH MG: at 15:30

## 2021-06-06 RX ADMIN — PANTOPRAZOLE SODIUM SCH MG: 40 TABLET, DELAYED RELEASE ORAL at 07:55

## 2021-06-06 RX ADMIN — GUAIFENESIN SCH MG: 600 TABLET, EXTENDED RELEASE ORAL at 09:52

## 2021-06-06 RX ADMIN — Medication SCH MG: at 19:30

## 2021-06-06 RX ADMIN — ALBUTEROL SULFATE SCH MG: 2.5 SOLUTION RESPIRATORY (INHALATION) at 07:28

## 2021-06-06 RX ADMIN — ENOXAPARIN SODIUM SCH MG: 40 INJECTION SUBCUTANEOUS at 21:16

## 2021-06-06 RX ADMIN — Medication SCH MG: at 07:27

## 2021-06-06 RX ADMIN — TAMSULOSIN HYDROCHLORIDE SCH MG: 0.4 CAPSULE ORAL at 21:14

## 2021-06-06 RX ADMIN — Medication SCH MG: at 11:29

## 2021-06-06 NOTE — NUR
RN notes



Received patient in bed resting comfortably with no distress noted. Breathing even and 
unlabored. On 1lpm O2 via nasal cannula tolerating well. Alert and oriented. Verbally able 
to communicate needs. Requested suppository for constipation, administered Dulcolax 10mg 
suppository, with help. Had a large bowel movement. Specimen for stool collected. No 
significant change of condition. Kept clean and dry. Will endosrse to next shift for 
continuity of care.

## 2021-06-06 NOTE — NUR
RN NOTES,

PATIENT ASSESSED FOR BODY ASSESSMENT AND PATIENT REFUSED PICTURES AT THIS TIME, EDUCATION 
PROVIDED REGARDING IMPORTANCE OF PICTURES, STILL REFUSED, CHARGE NURSE AWARE.

## 2021-06-07 VITALS — DIASTOLIC BLOOD PRESSURE: 66 MMHG | SYSTOLIC BLOOD PRESSURE: 106 MMHG

## 2021-06-07 VITALS — SYSTOLIC BLOOD PRESSURE: 119 MMHG | DIASTOLIC BLOOD PRESSURE: 69 MMHG

## 2021-06-07 VITALS — DIASTOLIC BLOOD PRESSURE: 67 MMHG | SYSTOLIC BLOOD PRESSURE: 123 MMHG

## 2021-06-07 VITALS — SYSTOLIC BLOOD PRESSURE: 131 MMHG | DIASTOLIC BLOOD PRESSURE: 71 MMHG

## 2021-06-07 LAB
ALBUMIN SERPL ELPH-MCNC: 2.7 G/DL (ref 2.9–4.4)
ALBUMIN/GLOB SERPL: 0.6 {RATIO} (ref 0.7–1.7)
ALPHA1 GLOB SERPL ELPH-MCNC: 0.5 G/DL (ref 0–0.4)
ALPHA2 GLOB SERPL ELPH-MCNC: 1.3 G/DL (ref 0.4–1)
B-GLOBULIN SERPL ELPH-MCNC: 1.2 G/DL (ref 0.7–1.3)
BASOPHILS # BLD AUTO: 0 /CMM (ref 0–0.2)
BASOPHILS NFR BLD AUTO: 0.5 % (ref 0–2)
BUN SERPL-MCNC: 18 MG/DL (ref 7–18)
CALCIUM SERPL-MCNC: 8.5 MG/DL (ref 8.5–10.1)
CHLORIDE SERPL-SCNC: 100 MMOL/L (ref 98–107)
CO2 SERPL-SCNC: 31 MMOL/L (ref 21–32)
CREAT SERPL-MCNC: 1.4 MG/DL (ref 0.6–1.3)
EOSINOPHIL NFR BLD AUTO: 0.1 % (ref 0–6)
GAMMA GLOB SERPL ELPH-MCNC: 1.4 G/DL (ref 0.4–1.8)
GLOBULIN SER CALC-MCNC: 4.4 G/DL (ref 2.2–3.9)
GLUCOSE SERPL-MCNC: 83 MG/DL (ref 74–106)
HCT VFR BLD AUTO: 23 % (ref 39–51)
HGB BLD-MCNC: 7.7 G/DL (ref 13.5–17.5)
LYMPHOCYTES NFR BLD AUTO: 0.7 /CMM (ref 0.8–4.8)
LYMPHOCYTES NFR BLD AUTO: 12.5 % (ref 20–44)
LYMPHOCYTES NFR BLD MANUAL: 16 % (ref 16–48)
MAGNESIUM SERPL-MCNC: 2.3 MG/DL (ref 1.8–2.4)
MCHC RBC AUTO-ENTMCNC: 33 G/DL (ref 31–36)
MCV RBC AUTO: 84 FL (ref 80–96)
MONOCYTES NFR BLD AUTO: 1.3 /CMM (ref 0.1–1.3)
MONOCYTES NFR BLD AUTO: 23.3 % (ref 2–12)
MONOCYTES NFR BLD MANUAL: 22 % (ref 0–11)
NEUTROPHILS # BLD AUTO: 3.5 /CMM (ref 1.8–8.9)
NEUTROPHILS NFR BLD AUTO: 63.6 % (ref 43–81)
NEUTS SEG NFR BLD MANUAL: 62 % (ref 42–76)
PHOSPHATE SERPL-MCNC: 3.6 MG/DL (ref 2.5–4.9)
PLATELET # BLD AUTO: 208 /CMM (ref 150–450)
POTASSIUM SERPL-SCNC: 4.3 MMOL/L (ref 3.5–5.1)
RBC # BLD AUTO: 2.78 MIL/UL (ref 4.5–6)
SODIUM SERPL-SCNC: 139 MMOL/L (ref 136–145)
WBC NRBC COR # BLD AUTO: 5.5 K/UL (ref 4.3–11)

## 2021-06-07 RX ADMIN — Medication SCH MG: at 11:30

## 2021-06-07 RX ADMIN — ALBUTEROL SULFATE SCH MG: 2.5 SOLUTION RESPIRATORY (INHALATION) at 07:35

## 2021-06-07 RX ADMIN — PANTOPRAZOLE SODIUM SCH MG: 40 TABLET, DELAYED RELEASE ORAL at 08:43

## 2021-06-07 RX ADMIN — Medication SCH MG: at 08:43

## 2021-06-07 RX ADMIN — FLUOXETINE HYDROCHLORIDE SCH MG: 20 CAPSULE ORAL at 08:44

## 2021-06-07 RX ADMIN — Medication SCH MG: at 07:35

## 2021-06-07 RX ADMIN — GUAIFENESIN SCH MG: 600 TABLET, EXTENDED RELEASE ORAL at 08:43

## 2021-06-07 RX ADMIN — ALBUTEROL SULFATE SCH MG: 2.5 SOLUTION RESPIRATORY (INHALATION) at 11:30

## 2021-06-07 RX ADMIN — FERROUS SULFATE TAB 325 MG (65 MG ELEMENTAL FE) SCH MG: 325 (65 FE) TAB at 08:44

## 2021-06-07 NOTE — NUR
RN OPENING NOTES

PATIENT PRESENT IN BED, AWAKE, A/OX4, ON ROOM AIR, SPO2 98%, NO SOB NOTED, NO PAIN REPORTED, 
NSR ON TELEMONITOR , HR [id number],  IV LINE ON L HAND FLUSHED WELL, MIDLINE ON RIGHT UPPER ARM, 
INTACT, PATENT, FLUSHED. SAFETY MEASURES IN PLACE, BED LOCKED, IN LOWEST POSITION, HOB 
ELEVATED, CALL LIGHT IN REACH, WILL CONT TO MONITOR

## 2021-06-07 NOTE — NUR
RN DISCHARGE NOTES

PATIENT DISCHARGED WITH AMBULANCE [facility] , [address], ID AND DESTINATION CONFIRMED, REPORT 
GIVEN TO FACILITY, IV LINES REMOVED, PATIENT CLEANED AND DRESSED UP INTO CLEAN GOWN

## 2021-06-07 NOTE — NUR
RN NOTES, 

NO SIGNIFICANT CHANGE IN CONDITION DURING THE NIGHT, WILL ENDORSE CONTINUITY OF CARE TO 
ONCOMING NURSE.